# Patient Record
Sex: FEMALE | Race: WHITE | Employment: OTHER | URBAN - METROPOLITAN AREA
[De-identification: names, ages, dates, MRNs, and addresses within clinical notes are randomized per-mention and may not be internally consistent; named-entity substitution may affect disease eponyms.]

---

## 2017-02-24 ENCOUNTER — HOSPITAL ENCOUNTER (OUTPATIENT)
Dept: MAMMOGRAPHY | Age: 82
Discharge: HOME | End: 2017-02-24
Attending: INTERNAL MEDICINE | Admitting: INTERNAL MEDICINE

## 2017-02-24 DIAGNOSIS — Z12.31 ENCOUNTER FOR SCREENING MAMMOGRAM FOR BREAST CANCER: ICD-10-CM

## 2018-02-26 ENCOUNTER — HOSPITAL ENCOUNTER (OUTPATIENT)
Dept: MAMMOGRAPHY | Age: 83
Discharge: HOME | End: 2018-02-26
Attending: INTERNAL MEDICINE | Admitting: INTERNAL MEDICINE

## 2018-02-26 DIAGNOSIS — Z12.31 ENCOUNTER FOR SCREENING MAMMOGRAM FOR MALIGNANT NEOPLASM OF BREAST: ICD-10-CM

## 2018-09-05 ENCOUNTER — APPOINTMENT (OUTPATIENT)
Dept: GENERAL RADIOLOGY | Age: 83
End: 2018-09-05
Payer: MEDICARE

## 2018-09-05 ENCOUNTER — HOSPITAL ENCOUNTER (EMERGENCY)
Age: 83
Discharge: HOME | End: 2018-09-05
Attending: EMERGENCY MEDICINE
Payer: MEDICARE

## 2018-09-05 VITALS
WEIGHT: 246 LBS | TEMPERATURE: 98.2 F | OXYGEN SATURATION: 95 % | BODY MASS INDEX: 44.99 KG/M2 | SYSTOLIC BLOOD PRESSURE: 182 MMHG | HEART RATE: 65 BPM | DIASTOLIC BLOOD PRESSURE: 77 MMHG | RESPIRATION RATE: 14 BRPM

## 2018-09-05 DIAGNOSIS — R07.9 CHEST PAIN, UNSPECIFIED TYPE: Primary | ICD-10-CM

## 2018-09-05 LAB
A/G RATIO: 1.1 (ref 1.1–2.2)
ALBUMIN SERPL-MCNC: 3.9 G/DL (ref 3.4–5)
ALP BLD-CCNC: 40 U/L (ref 40–129)
ALT SERPL-CCNC: 22 U/L (ref 10–40)
ANION GAP SERPL CALCULATED.3IONS-SCNC: 16 MMOL/L (ref 3–16)
AST SERPL-CCNC: 36 U/L (ref 15–37)
BASOPHILS ABSOLUTE: 0.1 K/UL (ref 0–0.2)
BASOPHILS RELATIVE PERCENT: 1.2 %
BILIRUB SERPL-MCNC: 0.3 MG/DL (ref 0–1)
BUN BLDV-MCNC: 14 MG/DL (ref 7–20)
CALCIUM SERPL-MCNC: 9.6 MG/DL (ref 8.3–10.6)
CHLORIDE BLD-SCNC: 99 MMOL/L (ref 99–110)
CO2: 22 MMOL/L (ref 21–32)
CREAT SERPL-MCNC: 0.9 MG/DL (ref 0.6–1.2)
EOSINOPHILS ABSOLUTE: 0.7 K/UL (ref 0–0.6)
EOSINOPHILS RELATIVE PERCENT: 6.7 %
GFR AFRICAN AMERICAN: >60
GFR NON-AFRICAN AMERICAN: >60
GLOBULIN: 3.4 G/DL
GLUCOSE BLD-MCNC: 129 MG/DL (ref 70–99)
HCT VFR BLD CALC: 37.4 % (ref 36–48)
HEMOGLOBIN: 12.1 G/DL (ref 12–16)
LYMPHOCYTES ABSOLUTE: 1.6 K/UL (ref 1–5.1)
LYMPHOCYTES RELATIVE PERCENT: 16.4 %
MCH RBC QN AUTO: 26.9 PG (ref 26–34)
MCHC RBC AUTO-ENTMCNC: 32.4 G/DL (ref 31–36)
MCV RBC AUTO: 83.1 FL (ref 80–100)
MONOCYTES ABSOLUTE: 0.8 K/UL (ref 0–1.3)
MONOCYTES RELATIVE PERCENT: 8.1 %
NEUTROPHILS ABSOLUTE: 6.7 K/UL (ref 1.7–7.7)
NEUTROPHILS RELATIVE PERCENT: 67.6 %
PDW BLD-RTO: 15.7 % (ref 12.4–15.4)
PLATELET # BLD: 288 K/UL (ref 135–450)
PLATELET SLIDE REVIEW: ADEQUATE
PMV BLD AUTO: 8.9 FL (ref 5–10.5)
POTASSIUM SERPL-SCNC: 5.2 MMOL/L (ref 3.5–5.1)
RBC # BLD: 4.5 M/UL (ref 4–5.2)
SODIUM BLD-SCNC: 137 MMOL/L (ref 136–145)
TOTAL PROTEIN: 7.3 G/DL (ref 6.4–8.2)
TROPONIN: <0.01 NG/ML
WBC # BLD: 9.9 K/UL (ref 4–11)

## 2018-09-05 PROCEDURE — 84484 ASSAY OF TROPONIN QUANT: CPT

## 2018-09-05 PROCEDURE — 93005 ELECTROCARDIOGRAM TRACING: CPT | Performed by: EMERGENCY MEDICINE

## 2018-09-05 PROCEDURE — 71046 X-RAY EXAM CHEST 2 VIEWS: CPT

## 2018-09-05 PROCEDURE — 99285 EMERGENCY DEPT VISIT HI MDM: CPT

## 2018-09-05 PROCEDURE — 80053 COMPREHEN METABOLIC PANEL: CPT

## 2018-09-05 PROCEDURE — 85025 COMPLETE CBC W/AUTO DIFF WBC: CPT

## 2018-09-05 RX ORDER — METOPROLOL TARTRATE 50 MG/1
50 TABLET, FILM COATED ORAL 2 TIMES DAILY
Status: ON HOLD | COMMUNITY
End: 2022-08-11

## 2018-09-05 RX ORDER — CEPHALEXIN 500 MG/1
250 CAPSULE ORAL 2 TIMES DAILY
Status: ON HOLD | COMMUNITY
End: 2022-04-28 | Stop reason: ALTCHOICE

## 2018-09-05 ASSESSMENT — PAIN SCALES - GENERAL: PAINLEVEL_OUTOF10: 4

## 2018-09-05 ASSESSMENT — PAIN DESCRIPTION - LOCATION: LOCATION: CHEST

## 2018-09-06 LAB
EKG ATRIAL RATE: 61 BPM
EKG DIAGNOSIS: NORMAL
EKG P AXIS: 41 DEGREES
EKG P-R INTERVAL: 190 MS
EKG Q-T INTERVAL: 418 MS
EKG QRS DURATION: 84 MS
EKG QTC CALCULATION (BAZETT): 420 MS
EKG R AXIS: -12 DEGREES
EKG T AXIS: 69 DEGREES
EKG VENTRICULAR RATE: 61 BPM

## 2018-09-06 PROCEDURE — 93010 ELECTROCARDIOGRAM REPORT: CPT | Performed by: INTERNAL MEDICINE

## 2019-03-19 ENCOUNTER — HOSPITAL ENCOUNTER (OUTPATIENT)
Dept: WOMENS IMAGING | Age: 84
Discharge: HOME | End: 2019-03-19
Payer: MEDICARE

## 2019-03-19 DIAGNOSIS — Z12.31 ENCOUNTER FOR SCREENING MAMMOGRAM FOR MALIGNANT NEOPLASM OF BREAST: ICD-10-CM

## 2019-03-19 PROCEDURE — 77067 SCR MAMMO BI INCL CAD: CPT

## 2020-07-20 ENCOUNTER — HOSPITAL ENCOUNTER (OUTPATIENT)
Dept: WOMENS IMAGING | Age: 85
Discharge: HOME | End: 2020-07-20
Payer: MEDICARE

## 2020-07-20 PROCEDURE — 77067 SCR MAMMO BI INCL CAD: CPT

## 2021-02-23 NOTE — ED PROVIDER NOTES
[address] [facility]  ED  eMERGENCY dEPARTMENT eNCOUnter        Pt Name: Roxana Cramer  MRN: [id number]  Patrickgfelsy 1935  Date of evaluation: 9/5/2018  Provider: Moy Kimball DO  PCP: Lina Cottrell MD      CHIEF COMPLAINT       Chief Complaint   Patient presents with    Chest Pain     since monday. pain radiating down both arms    Back Pain     upper back       HISTORY OF PRESENT ILLNESS   (Location/Symptom, Timing/Onset, Context/Setting, Quality, Duration, Modifying Factors, Severity)  Note limiting factors. Roxana Cramer is a [de-identified] y.o. female  presents to the emergency department with complaint of Right-sided chest pain which started 2 days ago and was constant through yesterday although improved and has resolved today. She had no associated nausea. She had no shortness of breath. She had no trauma. She had no abdominal pain. She is told to come in for evaluation by her physician. She does have a history of coronary artery disease with stent. She has a history of hypertension. Nursing Notes were all reviewed and agreed with or any disagreements were addressed  in the HPI. REVIEW OF SYSTEMS    (2-9 systems for level 4, 10 or more for level 5)     Review of Systems  REVIEW OF SYSTEMS    Constitutional:  Denies fever, chills, weight loss or weakness   Eyes:  Denies photophobia or discharge   HENT:  Denies sore throat or ear pain   Respiratory:  Denies cough or shortness of breath   Cardiovascular:  Denies palpitations or swelling   GI:  Denies abdominal pain, nausea, vomiting, or diarrhea   Musculoskeletal:  Denies back pain   Skin:  Denies rash   Neurologic:  Denies headache, focal weakness or sensory changes   Endocrine:  Denies polyuria or polydypsia   Lymphatic:  Denies swollen glands   Psychiatric:  Denies depression, suicidal ideation or homicidal ideation   All systems negative except as marked. Positives and Pertinent negatives as per HPI. Chronic pain due to neoplasm

## 2021-05-08 ENCOUNTER — HOSPITAL ENCOUNTER (EMERGENCY)
Age: 86
Discharge: LEFT BEFORE DISCHARGE | End: 2021-05-08
Payer: MEDICARE

## 2021-05-08 VITALS
RESPIRATION RATE: 18 BRPM | BODY MASS INDEX: 44.16 KG/M2 | HEIGHT: 62 IN | TEMPERATURE: 97.5 F | SYSTOLIC BLOOD PRESSURE: 166 MMHG | DIASTOLIC BLOOD PRESSURE: 78 MMHG | HEART RATE: 64 BPM | OXYGEN SATURATION: 96 % | WEIGHT: 240 LBS

## 2021-05-08 ASSESSMENT — PAIN SCALES - GENERAL: PAINLEVEL_OUTOF10: 8

## 2021-05-10 ENCOUNTER — HOSPITAL ENCOUNTER (OUTPATIENT)
Dept: GENERAL RADIOLOGY | Age: 86
Discharge: HOME | End: 2021-05-10
Payer: MEDICARE

## 2021-05-10 DIAGNOSIS — N30.00 ACUTE CYSTITIS WITHOUT HEMATURIA: ICD-10-CM
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
## 2021-05-10 PROCEDURE — 71101 X-RAY EXAM UNILAT RIBS/CHEST: CPT

## 2021-09-13 ENCOUNTER — HOSPITAL ENCOUNTER (OUTPATIENT)
Dept: WOMENS IMAGING | Age: 86
Discharge: HOME | End: 2021-09-13
Payer: MEDICARE

## 2021-09-13 VITALS — HEIGHT: 62 IN | BODY MASS INDEX: 46.01 KG/M2 | WEIGHT: 250 LBS

## 2021-09-13 DIAGNOSIS — Z12.31 VISIT FOR SCREENING MAMMOGRAM: ICD-10-CM

## 2021-09-13 PROCEDURE — 77067 SCR MAMMO BI INCL CAD: CPT

## 2021-10-11 ENCOUNTER — HOSPITAL ENCOUNTER (OUTPATIENT)
Dept: GENERAL RADIOLOGY | Age: 86
Discharge: HOME | End: 2021-10-11
Payer: MEDICARE

## 2021-10-11 DIAGNOSIS — G89.29 CHRONIC PAIN OF RIGHT ANKLE: ICD-10-CM

## 2021-10-11 DIAGNOSIS — G89.29 CHRONIC RIGHT-SIDED LOW BACK PAIN WITHOUT SCIATICA: ICD-10-CM

## 2021-10-11 DIAGNOSIS — M54.50 CHRONIC RIGHT-SIDED LOW BACK PAIN WITHOUT SCIATICA: ICD-10-CM

## 2021-10-11 DIAGNOSIS — M79.651 ACUTE PAIN OF RIGHT THIGH: ICD-10-CM

## 2021-10-11 DIAGNOSIS — M25.571 CHRONIC PAIN OF RIGHT ANKLE: ICD-10-CM

## 2021-10-11 PROCEDURE — 73610 X-RAY EXAM OF ANKLE: CPT

## 2021-10-11 PROCEDURE — 72220 X-RAY EXAM SACRUM TAILBONE: CPT

## 2021-10-11 PROCEDURE — 73552 X-RAY EXAM OF FEMUR 2/>: CPT

## 2021-12-06 ENCOUNTER — OFFICE VISIT (OUTPATIENT)
Dept: ORTHOPEDIC SURGERY | Age: 86
End: 2021-12-06
Payer: MEDICARE

## 2021-12-06 VITALS — BODY MASS INDEX: 46.01 KG/M2 | HEIGHT: 62 IN | WEIGHT: 250 LBS

## 2021-12-06 DIAGNOSIS — M19.011 ARTHRITIS OF RIGHT ACROMIOCLAVICULAR JOINT: ICD-10-CM

## 2021-12-06 DIAGNOSIS — M25.511 ACUTE PAIN OF RIGHT SHOULDER: ICD-10-CM

## 2021-12-06 DIAGNOSIS — M75.41 SHOULDER IMPINGEMENT SYNDROME, RIGHT: Primary | ICD-10-CM

## 2021-12-06 PROCEDURE — 99203 OFFICE O/P NEW LOW 30 MIN: CPT | Performed by: ORTHOPAEDIC SURGERY

## 2021-12-06 NOTE — PROGRESS NOTES
ORTHOPAEDIC SURGERY H&P / CONSULTATION NOTE    Chief complaint:   Chief Complaint   Patient presents with    Shoulder Pain     right shoulder pain        History of present illness: The patient is a 82 y.o. female right hand dominant with subjective symptoms of right shoulder pain. The chief complaint is located at lateral and superior aspect of the right shoulder. Duration of symptoms has been for 3 weeks. The severity of symptoms is rated at 5/10 pain on intake form. 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 She states dull pain and some muscle soreness specifically while pulling. She has been using ibuprofen and ice as well as Voltaren topical gel and Tylenol but she feels if this is been dramatically helping her. She has not done any formal therapy. She states she has a difficult time getting to and from given her low back related issues. She denies trauma or fall. The patient has tried the below listed items prior to today's consultation for above listed chief complaint.     +   Over-the-counter anti-inflammatories/prescription medication anti-inflammatory. -   Physical therapy / guided home exercise program -     -   Previous corticosteroid injections    Past medical history:    Past Medical History:   Diagnosis Date    [facility]     Cancer ([facility]     Diabetes mellitus ([facility]     GERD (gastroesophageal reflux disease)     History of therapeutic radiation     Hyperlipidemia     Hypertension     Influenza A 12/19/2017    Kidney stone         Past surgical history:    Past Surgical History:   Procedure Laterality Date    BREAST LUMPECTOMY      right    COLONOSCOPY  8/31/2015    HYSTERECTOMY      LITHOTRIPSY      RECTAL SURGERY          Allergies:     Allergies   Allergen Reactions    Demerol Other (See Comments)     mean    Antihistamine [Diphenhydramine Hcl]     Ciprofloxacin     Codeine     Levofloxacin     Librax [Chlordiazepoxide-Methscopol]     Macrodantin [Nitrofurantoin Macrocrystal]     Morphine     Penicillins     Pyridium [Phenazopyridine Hcl]     Ranitidine Hcl     Spironolactone Diarrhea    Sulfa Antibiotics     Vicodin [Hydrocodone-Acetaminophen] Nausea Only     STATES THAT IT MOSTLY MAKES HER GOOFY, AND SLIGHTLY NAUSEOUS    Dilaudid [Hydromorphone] Nausea And Vomiting     WORKED WELL FOR PAIN BUT MADE HER NAUSEOUS FOR 2 HOURS         Medications:   Current Outpatient Medications:     metoprolol tartrate (LOPRESSOR) 50 MG tablet, Take 50 mg by mouth 2 times daily, Disp: , Rfl:     cephALEXin (KEFLEX) 500 MG capsule, Take 250 mg by mouth 2 times daily, Disp: , Rfl:     insulin lispro (HUMALOG) 100 UNIT/ML injection vial, Inject 25 Units into the skin 3 times daily (before meals) , Disp: , Rfl:     sitagliptan-metformin (JANUMET)  MG per tablet, Take 1 tablet by mouth 2 times daily (with meals). , Disp: , Rfl:     lisinopril (PRINIVIL;ZESTRIL) 40 MG tablet, Take 40 mg by mouth daily. , Disp: , Rfl:     pantoprazole (PROTONIX) 40 MG tablet, Take 20 mg by mouth daily , Disp: , Rfl:     aspirin 81 MG EC tablet, Take 81 mg by mouth daily. , Disp: , Rfl:     simvastatin (ZOCOR) 20 MG tablet, Take 20 mg by mouth nightly.  , Disp: , Rfl:     insulin glargine (LANTUS) 100 UNIT/ML injection, Inject 55 Units into the skin nightly , Disp: , Rfl:      Social history: Denies IV drug use. Social History     Socioeconomic History    Marital status:       Spouse name: Not on file    Number of children: Not on file    Years of education: Not on file    Highest education level: Not on file   Occupational History    Not on file   Tobacco Use    Smoking status: Never Smoker    Smokeless tobacco: Never Used   Substance and Sexual Activity    Alcohol use: No    Drug use: Not on file    Sexual activity: Not on file   Other Topics Concern  Not on file   Social History Narrative    Not on file     Social Determinants of Health     Financial Resource Strain:     Difficulty of Paying Living Expenses: Not on file   Food Insecurity:     Worried About Running Out of Food in the Last Year: Not on file    Andres of Food in the Last Year: Not on file   Transportation Needs:     Lack of Transportation (Medical): Not on file    Lack of Transportation (Non-Medical): Not on file   Physical Activity:     Days of Exercise per Week: Not on file    Minutes of Exercise per Session: Not on file   Stress:     Feeling of Stress : Not on file   Social Connections:     Frequency of Communication with Friends and Family: Not on file    Frequency of Social Gatherings with Friends and Family: Not on file    Attends Alevism Services: Not on file    Active Member of [address] [place] [address] [employer] or Organizations: Not on file    Attends Club or Organization Meetings: Not on file    Marital Status: Not on file   Intimate Partner Violence:     Fear of Current or Ex-Partner: Not on file    Emotionally Abused: Not on file    Physically Abused: Not on file    Sexually Abused: Not on file   Housing Stability:     Unable to Pay for Housing in the Last Year: Not on file    Number of [place] in the Last Year: Not on file    Unstable Housing in the Last Year: Not on file     Tobacco use. Social History     Tobacco Use   Smoking Status Never Smoker   Smokeless Tobacco Never Used     [place]    Review of systems: Patient denies any fevers chills chest pain shortness of breath nausea vomiting significant weight loss any change in voiding or bowel movements. Patient denies any significant numbness or tingling at baseline as it relates to this presenting symptom/chief complaint. The patient denies any significant problems with skin or any significant allergies. Physical examination:  Body mass index is 45.73 kg/m².   AAOx3, NCAT  EOMI  MMM  RR  Unlabored breathing, no wheezing  Skin intact BUE and BLE, warm and moist  Bilateral upper extremity examination specific to subjective symptoms    Exam Right Shoulder                                                Active Range of Motion (FF/Abd/ER/IR)         130/130/50/L5  Passive Range of Motion (FF/Abd/ER/IR)      140/140  Trace   Neer,    trace Camacho,      5 -/5   empty Can,          5/5 ER arm at the side,       5/5   belly Press,      5/5 bear Hug,       equivocal O'Briens,      none TTP at Biceps Tendon Sheath,     negative Speed, negative Yergeson, positive   TTP AC Joint, positive cross arm adduction,                          Skin intact throughout  5/5 D B T G IO EPL  SILT Ax, R, U, M  +2 radial pulse    Diagnostic imaging:  MY READ:  4V R shoulder 12/6/21: Positive anterior acromial spur, type II acromion with anterior spur. Positive acromioclavicular joint arthrosis mild to moderate. No gross glenohumeral arthrosis albeit there is seemingly impingement with the way the [place]  view was taken today subacromially    Pertinent lab work: Self-reported hemoglobin A1c of 7.6     Diagnosis Orders   1. Shoulder impingement syndrome, right     2. Arthritis of right acromioclavicular joint     3. Acute pain of right shoulder  XR SHOULDER RIGHT (MIN 2 VIEWS)       Assessment and plan: 82 y.o. female with current subjective symptoms and physical exam findings with diagnostic imaging correlating to right shoulder rotator cuff tendinitis/bursitis and acromioclavicular joint arthritis. -Time of 16 minutes was spent coordinating and discussing the clinical findings, reviewing diagnostic imaging as indicated, coordinating care with prior notes review and current clinical encounter documentation as it pertains to the patient's presenting subjective symptoms and diagnoses. -I reviewed with the patient the imaging findings as well as clinical exam and  how it correlates to subjective symptoms.   -I had a pleasant discussion with the patient today. I reviewed with her that currently her clinical examination trends towards impingement/rotator cuff tendinitis findings. I reviewed with her consideration for nonoperative treatment options at this time.  -I offered formal physical therapy however she does not wish to go given that she has low back issues and makes it difficult for prolonged travel. We have printed off a home exercise program with [employer] to be utilized. -OTC Tylenol Voltaren gel topical per bottle as needed discomfort.  -I did offer corticosteroid injection into the right shoulder subacromial space to treat bursitis/impingement related symptoms. Currently there is no A1c listed in our system however she self-reports with previous lab draw an outside provider. Should she wish to consider a corticosteroid injection which I did offer today however she wishes to hold off given she has had an improvement in her baseline symptoms over the last week or so, then this is not unreasonable. However she will contact the office in the future should she wish for this. Pending the results of the subacromial space corticosteroid injection, consideration for a right shoulder acromioclavicular joint corticosteroid injection intra-articularly.  -Should she exhaust nonoperative treatment options and continued to have pain despite above listed conservative care, consideration at that time for a MRI however I feel that she will likely continue to improve with conservative care  -All questions answered to the patient's satisfaction and the patient expressed understanding and agreement with the above listed treatment plan  -Follow up in 2 to 6 weeks  -Thank you for the clinical consultation and allowing me to participate in the patient's care.       Electronically signed by Gopal Gallagher MD on 12/6/21 at 1:05 PM GENTRY Gallagher MD       Orthopaedic Surgery-Sports Medicine        Disclaimer: This note was dictated with voice recognition software. Though review and correction are routinely performed, please contact the office/medical records for any errors requiring correction.

## 2021-12-16 ENCOUNTER — TELEPHONE (OUTPATIENT)
Dept: ORTHOPEDIC SURGERY | Age: 86
End: 2021-12-16

## 2021-12-16 NOTE — TELEPHONE ENCOUNTER
Other Patient is requesting a call back regarding her right shoulder. Patient had lymph nodes removed on right side and would like to know if she can get injuection. She was told [facility] oncologist nothing should be done on that side. Also she would like if dr Ashu Wu can take a look at her ankle xray from October she is still having pain.   [phone]

## 2021-12-17 NOTE — TELEPHONE ENCOUNTER
Patient called me back and we discussed that at this point, if her oncologist stated that she should not get any injections done in that shoulder, this is the plan we will stay with. She says that she is getting a bit better day by day but is having some pain still. I told her to keep up with the exercises and let us know if there is anything else needed. She can follow up with us as needed.

## 2022-01-31 ENCOUNTER — TELEPHONE (OUTPATIENT)
Dept: ORTHOPEDIC SURGERY | Age: 87
End: 2022-01-31

## 2022-01-31 NOTE — TELEPHONE ENCOUNTER
General Question     Subject: patient call today and has some questions about her ankle and Shoulder she just need  some questions answered. Please Advise.   Patient Elvia Bejarano    Contact Number: [phone]

## 2022-02-02 NOTE — TELEPHONE ENCOUNTER
PATIENT CALLED WITH REGARDS TO THIS MESSAGE. SHE STATES THAT THE PAIN IN HER ANKLE IS WORSE AND WAS WONDERING WHAT SHE CAN DO. I RECOMMENDED AN APPOINTMENT WITH DR. SUNSHINE KINCAID. SHE ACCEPTED THIS AND WILL SEE HER Tuesday THE 8TH AT THE [facility]. PATIENT ACCEPTED AND CONFIRMED.

## 2022-02-08 ENCOUNTER — OFFICE VISIT (OUTPATIENT)
Dept: ORTHOPEDIC SURGERY | Age: 87
End: 2022-02-08
Payer: MEDICARE

## 2022-02-08 VITALS — BODY MASS INDEX: 46.01 KG/M2 | HEIGHT: 62 IN | WEIGHT: 250 LBS

## 2022-02-08 DIAGNOSIS — M54.16 LUMBAR RADICULOPATHY, RIGHT: Primary | ICD-10-CM

## 2022-02-08 PROCEDURE — 99204 OFFICE O/P NEW MOD 45 MIN: CPT | Performed by: ORTHOPAEDIC SURGERY

## 2022-02-10 ENCOUNTER — TELEPHONE (OUTPATIENT)
Dept: ORTHOPEDIC SURGERY | Age: 87
End: 2022-02-10

## 2022-02-10 NOTE — TELEPHONE ENCOUNTER
General Question     Subject: PATIENT IS INQUIRING THAT IF HER EMG AND MRI CAN BE DONE IN THE SAME DAY  AT [facility]. PLEASE ADVISE.    Patient: Asad Rahman  Contact Number: [phone]

## 2022-02-16 ENCOUNTER — HOSPITAL ENCOUNTER (OUTPATIENT)
Dept: MRI IMAGING | Age: 87
Discharge: HOME | End: 2022-02-16
Payer: MEDICARE

## 2022-02-16 DIAGNOSIS — M54.16 LUMBAR RADICULOPATHY, RIGHT: ICD-10-CM

## 2022-02-16 PROCEDURE — 72148 MRI LUMBAR SPINE W/O DYE: CPT

## 2022-02-17 ENCOUNTER — TELEPHONE (OUTPATIENT)
Dept: ORTHOPEDIC SURGERY | Age: 87
End: 2022-02-17

## 2022-02-17 NOTE — TELEPHONE ENCOUNTER
General Question     Subject: PATIENT REQUESTING A CALL BACK IN REGARDS TO MRI RESULTS IN [facility] AND IF SHE SHOULD SCHEDULE NOW OR AFTER 3/16 EMG. PLEASE ADVISE.    Patient and /or Facility Request: Roseline Persaud  Contact Number: [phone]

## 2022-02-17 NOTE — TELEPHONE ENCOUNTER
Called patient and reviewed results of MRI of her lumbar spine. Recommend she proceed with EMG of R LE and then follow up with Dr. Travis Guillory for further eval and treatment recommendations.

## 2022-02-17 NOTE — TELEPHONE ENCOUNTER
Spoke with pt advised to get emg and follow up with spine after both tests( MRI and EMG 3/16)    Still would like a call bout her MRI results. rick      Mri Lumbar spine 2/16/2022:  Impression   1.  Multilevel lumbar spondylosis, most pronounced at L3-4 and L4-5 resulting   in severe canal stenosis.  Moderate canal stenosis at L2-3.   2. Moderate to severe left-sided foraminal stenosis at L5-S1.  Moderate   bilateral foraminal stenosis at L3-4, moderate right-sided foraminal stenosis   at L5-S1.

## 2022-02-18 ENCOUNTER — TELEPHONE (OUTPATIENT)
Dept: ORTHOPEDIC SURGERY | Age: 87
End: 2022-02-18

## 2022-02-18 NOTE — TELEPHONE ENCOUNTER
Spoke with Simone Dangelo in scheduling [facility] out pt:      lmom to notify pt she was placed on cancellation list for [facility] [place] for emg testing to get in sooner per [facility]. 3/16/22 is soonest available. Scheduling checked other locations [facility] has no availability until 4/2022.

## 2022-02-22 ENCOUNTER — TELEPHONE (OUTPATIENT)
Dept: ORTHOPEDIC SURGERY | Age: 87
End: 2022-02-22

## 2022-02-22 NOTE — TELEPHONE ENCOUNTER
Explanation of EMG to pt   And why necessary. Sent emg to central scheduling fax# with demographics.  rick  At [phone]

## 2022-02-22 NOTE — TELEPHONE ENCOUNTER
General Question     Subject: EMG QUESTIONS  Patient and /or Facility Request: Elvia Bejarano  Contact Number: [phone]    PATIENT CALLING WITH QUESTIONS REGARDING THE EMG TEST. PATIENT WOULD LIKE A CALL BACK TO GET CLARITY ON WHY SHE NEEDS A EMG AND SHE HAD A MRI. PLEASE CALL BACK AT THE ABOVE NUMBER.

## 2022-03-16 ENCOUNTER — HOSPITAL ENCOUNTER (OUTPATIENT)
Dept: NEUROLOGY | Age: 87
Discharge: HOME | End: 2022-03-16
Payer: MEDICARE

## 2022-03-16 DIAGNOSIS — M54.16 LUMBAR RADICULOPATHY, RIGHT: ICD-10-CM

## 2022-03-16 PROCEDURE — 95908 NRV CNDJ TST 3-4 STUDIES: CPT | Performed by: PHYSICAL MEDICINE & REHABILITATION

## 2022-03-16 PROCEDURE — 95885 MUSC TST DONE W/NERV TST LIM: CPT | Performed by: PHYSICAL MEDICINE & REHABILITATION

## 2022-03-16 NOTE — PROCEDURES
Test Date:  3/16/2022    Patient: Akua Umanzor : 1935 Physician: Claudette Thrasher DO   Sex: Female ID#:  Ref Phys: Anita Kelly MD     Patient Complaints:  Patient is a 82year-old female who presents with pain in the right lower extremity onset over one year ago. Patient History / Exam:  PMH: + diabetes managed with insulin. No back or leg surgery PE: reflexes trace normal strength     NCV & EMG Findings:  Evaluation of the right sural sensory nerve showed reduced amplitude (5 µV). All remaining nerves (as indicated in the following tables) were within normal limits. Needle evaluation of the right gastrocnemius (medial head) and the right lumbo paraspinal (lower) muscles showed increased insertional activity and slightly increased spontaneous activity. All remaining muscles (as indicated in the following table) showed no evidence of electrical instability. Impression:  Study is consistent with right S1 radiculopathy.  no evidence of a generalized peripheral neuropathy        Claudette Thrasher DO        Nerve Conduction Studies  Motor Nerve Results      Latency Amplitude F-Lat Segment Distance CV Comment   Site (ms) Norm (mV) Norm (ms)  (cm) (m/s) Norm    Right Fibular (EDB) Motor   Ankle 4.4  < 6.1 3.3  > 2.0         Bel Fib Head 10.5 - 3.8 -  Bel Fib Head-Ankle 31 51  > 38    Right Tibial (AHB) Motor   Ankle 4.4  < 6.1 4.8  > 4.4         Knee 12.4 - - -  Knee-Ankle 34 43  > 39      Sensory Nerve Results      Latency (Peak) Amplitude (P-P) Segment Distance CV Comment   Site (ms) Norm (µV) Norm  (cm) (m/s) Norm    Right Sural Sensory   Calf-Lat Mall 3.6  < 4.0 5  > 5 Calf-Lat Mall 14 39  > 35        Electromyography     Side Muscle Nerve Root Ins Act Fibs Psw Amp Dur Poly Recrt Int Gretchen Sarah Comment   Right Gluteus Med Sup Gluteal L5-S1 Nml Nml Nml Nml Nml 0 Nml Nml    Right Vastus Med Femoral L2-L4 Nml Nml Nml Nml Nml 0 Nml Nml    Right Add Longus Obturator L2-L4 Nml Nml Nml Nml Nml 0 Nml Nml    Right Tib Anterior Deep Fibular,  Fibula. .. L4-L5 Nml Nml Nml Nml Nml 0 Nml Nml    Right Fib longus  L5-S1 Nml Nml Nml Nml Nml 0 Nml Nml    Right Gastroc MH Tibial S1-S2 Incr Nml 1+ Nml Nml 0 Nml Nml    Right Ext Jeffers Long Deep Fibular,  Fibula. .. L5-S1 Nml Nml Nml Nml Nml 0 Nml Nml    Right EDB Deep Fibular,  Fibula. .. L5-S1 Nml Nml Nml Nml Nml 0 Nml Nml    Right AHB Medial Plantar,  Tibi. ..  S1-S2 Nml Nml Nml Nml Nml 0 Nml Nml    Right Lumbo Paraspinal (Upper) Rami L1-L2 Nml Nml Nml         Right Lumbo Paraspinal (Mid) Rami L3-L4 Nml Nml Nml         Right Lumbo Paraspinal (Lower) Rami L5-S1 Incr Nml 1+             Electronically signed by Caesar Osborne DO on 3/16/2022 at 1:15 PM

## 2022-03-23 ENCOUNTER — OFFICE VISIT (OUTPATIENT)
Dept: ORTHOPEDIC SURGERY | Age: 87
End: 2022-03-23
Payer: MEDICARE

## 2022-03-23 VITALS — BODY MASS INDEX: 46.01 KG/M2 | HEIGHT: 62 IN | WEIGHT: 250 LBS

## 2022-03-23 DIAGNOSIS — M43.16 SPONDYLOLISTHESIS AT L4-L5 LEVEL: ICD-10-CM

## 2022-03-23 DIAGNOSIS — M48.061 SPINAL STENOSIS OF LUMBAR REGION, UNSPECIFIED WHETHER NEUROGENIC CLAUDICATION PRESENT: Primary | ICD-10-CM

## 2022-03-23 PROCEDURE — 99214 OFFICE O/P EST MOD 30 MIN: CPT | Performed by: ORTHOPAEDIC SURGERY

## 2022-03-23 NOTE — PROGRESS NOTES
New Patient: LUMBAR SPINE    Referring Provider:  No ref. provider found    CHIEF COMPLAINT:    Chief Complaint   Patient presents with    Back Pain     lumbar       HISTORY OF PRESENT ILLNESS:       Ms. Reggie Grimaldo  is a pleasant 82 y.o. female here for evaluation regarding her LBP and right leg pain. She states her pain began insidiously about 1+ years ago. Her pain has steadily continued since then. She rates her back pain 2/10 and right leg pain 3-9/10. She describes the pain as intermittent to constant. Pain is worse with standing, walking, at times lying down and improved some with sitting, changing positions, and leaning forward especially onto a grocery cart. The leg pain radiates down the posterior lateral aspect of her right leg to her ankle. She has a burning sensation and mild numbness and tingling in her right leg. She denies weakness of her right or left leg and has a history of urinary retention and has had bladder scope recently and does have frequent episodes of diarrhea which is not new. Bowel or bladder dysfunction. The pain at times disrupts her sleep.    Pain Assessment  Location of Pain: Back  Severity of Pain: 2  Quality of Pain: Other (Comment)  Duration of Pain: Other (Comment)]  Current/Past Treatment:   · Physical Therapy: None  · Chiropractic: None  · Injection: None  · Medications: Tylenol    Past Medical History:   Past Medical History:   Diagnosis Date    [facility]     Cancer [facility]     Diabetes mellitus ([facility]     GERD (gastroesophageal reflux disease)     History of therapeutic radiation     Hyperlipidemia     Hypertension     Influenza A 12/19/2017    Kidney stone         Past Surgical History:     Past Surgical History:   Procedure Laterality Date    BREAST LUMPECTOMY      right    COLONOSCOPY  8/31/2015    HYSTERECTOMY      LITHOTRIPSY      RECTAL SURGERY         Current Medications:     Current Outpatient Medications:     metoprolol tartrate (LOPRESSOR) 50 MG tablet, Take 50 mg by mouth 2 times daily, Disp: , Rfl:     cephALEXin (KEFLEX) 500 MG capsule, Take 250 mg by mouth 2 times daily, Disp: , Rfl:     insulin lispro (HUMALOG) 100 UNIT/ML injection vial, Inject 25 Units into the skin 3 times daily (before meals) , Disp: , Rfl:     sitagliptan-metformin (JANUMET)  MG per tablet, Take 1 tablet by mouth 2 times daily (with meals). , Disp: , Rfl:     lisinopril (PRINIVIL;ZESTRIL) 40 MG tablet, Take 40 mg by mouth daily. , Disp: , Rfl:     pantoprazole (PROTONIX) 40 MG tablet, Take 20 mg by mouth daily , Disp: , Rfl:     aspirin 81 MG EC tablet, Take 81 mg by mouth daily. , Disp: , Rfl:     simvastatin (ZOCOR) 20 MG tablet, Take 20 mg by mouth nightly.  , Disp: , Rfl:     insulin glargine (LANTUS) 100 UNIT/ML injection, Inject 55 Units into the skin nightly , Disp: , Rfl:     Allergies:  Demerol, Antihistamine [diphenhydramine hcl], Ciprofloxacin, Codeine, Levofloxacin, Librax [chlordiazepoxide-methscopol], Macrodantin [nitrofurantoin macrocrystal], Morphine, Penicillins, Pyridium [phenazopyridine hcl], Ranitidine hcl, Spironolactone, Sulfa antibiotics, Vicodin [hydrocodone-acetaminophen], and Dilaudid [hydromorphone]    Social History:    reports that she has never smoked. She has never used smokeless tobacco. She reports that she does not drink alcohol.     Family History:   Family History   Problem Relation Age of Onset    Breast Cancer Paternal Cousin     Breast Cancer Maternal Cousin        REVIEW OF SYSTEMS: Full ROS noted & scanned   CONSTITUTIONAL: Denies unexplained weight loss, fevers, chills or fatigue  NEUROLOGICAL: Denies unsteady gait or progressive weakness  MUSCULOSKELETAL: Denies joint swelling or redness  PSYCHOLOGICAL: Denies anxiety, depression   SKIN: Denies skin changes, delayed healing, rash, itching   HEMATOLOGIC: Denies easy bleeding or bruising  ENDOCRINE: Denies excessive thirst, urination, heat/cold  RESPIRATORY: Denies current dyspnea, cough  GI: Denies nausea, vomiting, diarrhea   : Denies bowel or bladder issues      PHYSICAL EXAM:    Vitals: Height 5' 2.01\" (1.575 m), weight 250 lb (113.4 kg). GENERAL EXAM:  · General Apparence: Patient is adequately groomed with no evidence of malnutrition. · Orientation: The patient is oriented to time, place and person. · Mood & Affect:The patient's mood and affect are appropriate. · Vascular: Examination reveals no swelling tenderness in upper or lower extremities. Good capillary refill. · Lymphatic: The lymphatic examination bilaterally reveals all areas to be without enlargement or induration  · Sensation: Sensation is intact without deficit  · Coordination/Balance: Good coordination. LUMBAR/SACRAL EXAMINATION:  · Inspection: Local inspection shows no step-off or bruising. Lumbar alignment is normal.  Sagittal and Coronal balance is neutral.      · Palpation: Diffuse tenderness at the midline. No tenderness bilaterally at the paraspinal or trochanters. There is no step-off or paraspinal spasm. · Range of Motion: Lumbar flexion, extension and rotation are moderately limited due to pain. · Strength:   Strength testing is 5/5 in all muscle groups tested. · Special Tests:   Straight leg raise and crossed SLR negative. Leg length and pelvis level. · Skin: There are no rashes, ulcerations or lesions. · Reflexes: Reflexes are symmetrically 2+ at the patellar and ankle tendons. Clonus absent bilaterally at the feet. · Gait & station: Patient ambulates with a Rollator    · Additional Examinations:   · RIGHT LOWER EXTREMITY: Inspection/examination of the right lower extremity does not show any tenderness, deformity or injury. Range of motion is unremarkable. There is no gross instability. There are no rashes, ulcerations or lesions.  Strength and tone are normal.  · LEFT LOWER EXTREMITY:  Inspection/examination of the left lower extremity does not show any tenderness, deformity or injury. Range of motion is unremarkable. There is no gross instability. There are no rashes, ulcerations or lesions. Strength and tone are normal.    Diagnostic Testing:    MRI of the lumbar spine that was obtained on 2/16/2022 was reviewed with the patient today which shows  Impression   1. Multilevel lumbar spondylosis, most pronounced at L3-4 and L4-5 resulting   in severe canal stenosis.  Moderate canal stenosis at L2-3.   2. Moderate to severe left-sided foraminal stenosis at L5-S1.  Moderate   bilateral foraminal stenosis at L3-4, moderate right-sided foraminal stenosis   at L5-S1. Impression:   Spinal stenosis on the right at L5-S1  Spondylolisthesis L4-5    Plan:      · We discussed the diagnosis and treatment options including observation, additional oral steroids, physical therapy, epidural injections and additional imaging. She wishes to proceed with referral to pain management for consideration of medication versus spinal injections. .    · Follow up -as needed    Old records were reviewed.

## 2022-04-28 ENCOUNTER — HOSPITAL ENCOUNTER (OUTPATIENT)
Age: 87
Setting detail: OUTPATIENT SURGERY
Discharge: HOME | End: 2022-04-28
Attending: PAIN MEDICINE | Admitting: PAIN MEDICINE
Payer: MEDICARE

## 2022-04-28 VITALS
RESPIRATION RATE: 16 BRPM | BODY MASS INDEX: 46.01 KG/M2 | SYSTOLIC BLOOD PRESSURE: 149 MMHG | TEMPERATURE: 98.2 F | WEIGHT: 250 LBS | DIASTOLIC BLOOD PRESSURE: 68 MMHG | OXYGEN SATURATION: 97 % | HEIGHT: 62 IN | HEART RATE: 62 BPM

## 2022-04-28 LAB
GLUCOSE BLD-MCNC: 191 MG/DL (ref 70–99)
PERFORMED ON: ABNORMAL

## 2022-04-28 PROCEDURE — 2500000003 HC RX 250 WO HCPCS: Performed by: PAIN MEDICINE

## 2022-04-28 PROCEDURE — 2709999900 HC NON-CHARGEABLE SUPPLY: Performed by: PAIN MEDICINE

## 2022-04-28 PROCEDURE — 6360000002 HC RX W HCPCS: Performed by: PAIN MEDICINE

## 2022-04-28 PROCEDURE — 3600000002 HC SURGERY LEVEL 2 BASE: Performed by: PAIN MEDICINE

## 2022-04-28 PROCEDURE — 7100000011 HC PHASE II RECOVERY - ADDTL 15 MIN: Performed by: PAIN MEDICINE

## 2022-04-28 PROCEDURE — 62323 NJX INTERLAMINAR LMBR/SAC: CPT | Performed by: PAIN MEDICINE

## 2022-04-28 PROCEDURE — 7100000010 HC PHASE II RECOVERY - FIRST 15 MIN: Performed by: PAIN MEDICINE

## 2022-04-28 PROCEDURE — 2580000003 HC RX 258: Performed by: PAIN MEDICINE

## 2022-04-28 PROCEDURE — 99152 MOD SED SAME PHYS/QHP 5/>YRS: CPT | Performed by: PAIN MEDICINE

## 2022-04-28 RX ORDER — SODIUM CHLORIDE, SODIUM LACTATE, POTASSIUM CHLORIDE, CALCIUM CHLORIDE 600; 310; 30; 20 MG/100ML; MG/100ML; MG/100ML; MG/100ML
INJECTION, SOLUTION INTRAVENOUS CONTINUOUS
Status: DISCONTINUED | OUTPATIENT
Start: 2022-04-28 | End: 2022-04-28 | Stop reason: HOSPADM

## 2022-04-28 RX ORDER — MIDAZOLAM HYDROCHLORIDE 1 MG/ML
INJECTION INTRAMUSCULAR; INTRAVENOUS PRN
Status: DISCONTINUED | OUTPATIENT
Start: 2022-04-28 | End: 2022-04-28 | Stop reason: ALTCHOICE

## 2022-04-28 RX ORDER — MIDAZOLAM HYDROCHLORIDE 1 MG/ML
INJECTION INTRAMUSCULAR; INTRAVENOUS
Status: DISCONTINUED
Start: 2022-04-28 | End: 2022-04-28 | Stop reason: HOSPADM

## 2022-04-28 RX ORDER — LIDOCAINE HYDROCHLORIDE 10 MG/ML
INJECTION, SOLUTION EPIDURAL; INFILTRATION; INTRACAUDAL; PERINEURAL PRN
Status: DISCONTINUED | OUTPATIENT
Start: 2022-04-28 | End: 2022-04-28 | Stop reason: ALTCHOICE

## 2022-04-28 RX ORDER — LIDOCAINE HYDROCHLORIDE 10 MG/ML
INJECTION, SOLUTION EPIDURAL; INFILTRATION; INTRACAUDAL; PERINEURAL
Status: DISCONTINUED
Start: 2022-04-28 | End: 2022-04-28 | Stop reason: HOSPADM

## 2022-04-28 RX ADMIN — SODIUM CHLORIDE, POTASSIUM CHLORIDE, SODIUM LACTATE AND CALCIUM CHLORIDE: 600; 310; 30; 20 INJECTION, SOLUTION INTRAVENOUS at 12:02

## 2022-04-28 ASSESSMENT — PAIN - FUNCTIONAL ASSESSMENT
PAIN_FUNCTIONAL_ASSESSMENT: 0-10
PAIN_FUNCTIONAL_ASSESSMENT: PREVENTS OR INTERFERES SOME ACTIVE ACTIVITIES AND ADLS

## 2022-04-28 ASSESSMENT — PAIN DESCRIPTION - DESCRIPTORS: DESCRIPTORS: ACHING;SHARP

## 2022-04-28 NOTE — PROGRESS NOTES
Pt taken to care via w/c, tolerable pain. Some numbness and tingling in lower extremities. Discharge instructions given to pt and brother in law, verbalized understanding and states no questions at this time. Brother in law driving pt home.

## 2022-04-28 NOTE — PROGRESS NOTES
Patient is able to demonstrate the ability to move from a reclining position to an upright position within the recliner. Pt checked in for procedure. . IV Started. Pt tolerated well. Pt's  in the car and number provided on the chart.

## 2022-05-17 ENCOUNTER — HOSPITAL ENCOUNTER (OUTPATIENT)
Dept: WOMENS IMAGING | Age: 87
Discharge: HOME | End: 2022-05-17
Payer: MEDICARE

## 2022-05-17 DIAGNOSIS — Z78.0 POSTMENOPAUSAL: ICD-10-CM

## 2022-05-17 PROCEDURE — 77080 DXA BONE DENSITY AXIAL: CPT

## 2022-06-02 ENCOUNTER — HOSPITAL ENCOUNTER (OUTPATIENT)
Age: 87
Setting detail: OUTPATIENT SURGERY
Discharge: HOME | End: 2022-06-02
Attending: PAIN MEDICINE | Admitting: PAIN MEDICINE
Payer: MEDICARE

## 2022-06-02 VITALS
BODY MASS INDEX: 46.01 KG/M2 | DIASTOLIC BLOOD PRESSURE: 80 MMHG | RESPIRATION RATE: 18 BRPM | OXYGEN SATURATION: 99 % | SYSTOLIC BLOOD PRESSURE: 178 MMHG | HEIGHT: 62 IN | WEIGHT: 250 LBS | TEMPERATURE: 98.7 F | HEART RATE: 68 BPM

## 2022-06-02 LAB
GLUCOSE BLD-MCNC: 179 MG/DL (ref 70–99)
PERFORMED ON: ABNORMAL

## 2022-06-02 PROCEDURE — 2709999900 HC NON-CHARGEABLE SUPPLY: Performed by: PAIN MEDICINE

## 2022-06-02 PROCEDURE — 99152 MOD SED SAME PHYS/QHP 5/>YRS: CPT | Performed by: PAIN MEDICINE

## 2022-06-02 PROCEDURE — 3600000002 HC SURGERY LEVEL 2 BASE: Performed by: PAIN MEDICINE

## 2022-06-02 PROCEDURE — 6360000002 HC RX W HCPCS: Performed by: PAIN MEDICINE

## 2022-06-02 PROCEDURE — 7100000011 HC PHASE II RECOVERY - ADDTL 15 MIN: Performed by: PAIN MEDICINE

## 2022-06-02 PROCEDURE — A9577 INJ MULTIHANCE: HCPCS | Performed by: PAIN MEDICINE

## 2022-06-02 PROCEDURE — 2500000003 HC RX 250 WO HCPCS: Performed by: PAIN MEDICINE

## 2022-06-02 PROCEDURE — 64484 NJX AA&/STRD TFRM EPI L/S EA: CPT | Performed by: PAIN MEDICINE

## 2022-06-02 PROCEDURE — 64483 NJX AA&/STRD TFRM EPI L/S 1: CPT | Performed by: PAIN MEDICINE

## 2022-06-02 PROCEDURE — 2580000003 HC RX 258: Performed by: PAIN MEDICINE

## 2022-06-02 PROCEDURE — 7100000010 HC PHASE II RECOVERY - FIRST 15 MIN: Performed by: PAIN MEDICINE

## 2022-06-02 PROCEDURE — 99153 MOD SED SAME PHYS/QHP EA: CPT | Performed by: PAIN MEDICINE

## 2022-06-02 PROCEDURE — 6360000004 HC RX CONTRAST MEDICATION: Performed by: PAIN MEDICINE

## 2022-06-02 PROCEDURE — 3600000012 HC SURGERY LEVEL 2 ADDTL 15MIN: Performed by: PAIN MEDICINE

## 2022-06-02 RX ORDER — MIDAZOLAM HYDROCHLORIDE 1 MG/ML
INJECTION INTRAMUSCULAR; INTRAVENOUS PRN
Status: DISCONTINUED | OUTPATIENT
Start: 2022-06-02 | End: 2022-06-02 | Stop reason: ALTCHOICE

## 2022-06-02 RX ORDER — SODIUM CHLORIDE, SODIUM LACTATE, POTASSIUM CHLORIDE, CALCIUM CHLORIDE 600; 310; 30; 20 MG/100ML; MG/100ML; MG/100ML; MG/100ML
INJECTION, SOLUTION INTRAVENOUS CONTINUOUS
Status: DISCONTINUED | OUTPATIENT
Start: 2022-06-02 | End: 2022-06-02 | Stop reason: HOSPADM

## 2022-06-02 RX ORDER — MIDAZOLAM HYDROCHLORIDE 1 MG/ML
INJECTION INTRAMUSCULAR; INTRAVENOUS
Status: DISCONTINUED
Start: 2022-06-02 | End: 2022-06-02 | Stop reason: WASHOUT

## 2022-06-02 RX ORDER — LIDOCAINE HYDROCHLORIDE 10 MG/ML
INJECTION, SOLUTION EPIDURAL; INFILTRATION; INTRACAUDAL; PERINEURAL PRN
Status: DISCONTINUED | OUTPATIENT
Start: 2022-06-02 | End: 2022-06-02 | Stop reason: ALTCHOICE

## 2022-06-02 RX ORDER — MIDAZOLAM HYDROCHLORIDE 1 MG/ML
INJECTION INTRAMUSCULAR; INTRAVENOUS
Status: DISCONTINUED
Start: 2022-06-02 | End: 2022-06-02 | Stop reason: HOSPADM

## 2022-06-02 RX ORDER — PRAVASTATIN SODIUM 20 MG
TABLET ORAL
COMMUNITY
Start: 2022-05-06

## 2022-06-02 RX ADMIN — LIDOCAINE HYDROCHLORIDE 0.1 ML: 10 INJECTION, SOLUTION EPIDURAL; INFILTRATION; INTRACAUDAL; PERINEURAL at 15:17

## 2022-06-02 RX ADMIN — SODIUM CHLORIDE, POTASSIUM CHLORIDE, SODIUM LACTATE AND CALCIUM CHLORIDE: 600; 310; 30; 20 INJECTION, SOLUTION INTRAVENOUS at 15:17

## 2022-06-02 ASSESSMENT — PAIN DESCRIPTION - DESCRIPTORS: DESCRIPTORS: ACHING;DULL;NUMBNESS

## 2022-06-02 ASSESSMENT — PAIN - FUNCTIONAL ASSESSMENT
PAIN_FUNCTIONAL_ASSESSMENT: 0-10
PAIN_FUNCTIONAL_ASSESSMENT: 0-10
PAIN_FUNCTIONAL_ASSESSMENT: PREVENTS OR INTERFERES WITH MANY ACTIVE NOT PASSIVE ACTIVITIES

## 2022-06-02 NOTE — PROGRESS NOTES
Discharge  instructions reviewed. Pt and family verbalize understanding with no further questions. VSS. Pt discharged via [facility] to car. Assessment unchanged. Pt states her pain is a 5 on 1-10 pain scale.

## 2022-06-02 NOTE — PROCEDURES
Edilma Yousif is a 82 y.o. female patient. No diagnosis found. Past Medical History:   Diagnosis Date    [facility]     Cancer ([facility]     [facility]     GERD (gastroesophageal reflux disease)     History of therapeutic radiation     Hyperlipidemia     Hypertension     Influenza A 12/19/2017    Kidney stone      Blood pressure (!) 187/106, pulse 80, temperature 97.4 °F (36.3 °C), temperature source Temporal, resp. rate 16, height 5' 2\" (1.575 m), weight 250 lb (113.4 kg), SpO2 90 %. Procedures    Parviz Whitaker MD  6/2/2022  TRANSFORAMINAL EPIDURAL AT R L45  LEVELS  71730 and 55554 x  with 82172,     INDICATIONS:  Lumbar Radiculitis 724.4, M54.16  OPERATIVE PROCEDURE:  Consent was signed by the patient after the risks and benefits were explained. With the patient in the prone position, the back was prepped with Prevail and draped. Aseptic technique was used at every stage of the procedure. Oblique fluoroscopic guidance was used to visualize the pedicle of the _L5__ vertebral body on the _R__ side. Skin infiltration was with 0.5 cc of 1% Lidocaine using a 30-gauge needle followed by placement of a _22__ -gauge _5__ -inch needle using oblique fluoroscopic guidance into the transforaminal epidural space way under the pedicle at the medial aspect just lateral to SAP. Final needle position was checked in AP view, which was just lateral to the 6 oclock position on the pedicle. Aspiration was negative for CSF or blood . Injection of Omnipaque dye (0.5 cc) showed appropriate spread along the nerve root and also into the epidural space and _1__ cc of _1__ % Lidocaine with _4.5 mg of CELESTONE   The needle was pulled out intact and discharge instructions were given. ESTIMATED BLOOD LOSS:  Less than 1 cc      Pulse Ox Monitoring was done.      Exact similar procedure was done at _R L4__ level.            ________________________________                   Taj Benavidez, M.D.

## 2022-08-08 ENCOUNTER — TELEPHONE (OUTPATIENT)
Dept: ORTHOPEDIC SURGERY | Age: 87
End: 2022-08-08

## 2022-08-08 NOTE — TELEPHONE ENCOUNTER
Notified [facility] regarding the medical records (all Jacquelynne Dates) for patient

## 2022-08-11 ENCOUNTER — HOSPITAL ENCOUNTER (OUTPATIENT)
Age: 87
Setting detail: OUTPATIENT SURGERY
Discharge: HOME | End: 2022-08-11
Attending: PAIN MEDICINE | Admitting: PAIN MEDICINE
Payer: MEDICARE

## 2022-08-11 VITALS
TEMPERATURE: 98 F | BODY MASS INDEX: 44.9 KG/M2 | DIASTOLIC BLOOD PRESSURE: 74 MMHG | OXYGEN SATURATION: 96 % | HEART RATE: 88 BPM | HEIGHT: 62 IN | RESPIRATION RATE: 16 BRPM | WEIGHT: 244 LBS | SYSTOLIC BLOOD PRESSURE: 177 MMHG

## 2022-08-11 PROBLEM — M47.816 LUMBAR SPONDYLOSIS: Status: ACTIVE | Noted: 2022-08-11

## 2022-08-11 LAB
GLUCOSE BLD-MCNC: 164 MG/DL (ref 70–99)
PERFORMED ON: ABNORMAL

## 2022-08-11 PROCEDURE — 7100000010 HC PHASE II RECOVERY - FIRST 15 MIN: Performed by: PAIN MEDICINE

## 2022-08-11 PROCEDURE — 2500000003 HC RX 250 WO HCPCS: Performed by: PAIN MEDICINE

## 2022-08-11 PROCEDURE — 64494 INJ PARAVERT F JNT L/S 2 LEV: CPT | Performed by: PAIN MEDICINE

## 2022-08-11 PROCEDURE — 2709999900 HC NON-CHARGEABLE SUPPLY: Performed by: PAIN MEDICINE

## 2022-08-11 PROCEDURE — 3600000002 HC SURGERY LEVEL 2 BASE: Performed by: PAIN MEDICINE

## 2022-08-11 PROCEDURE — 64493 INJ PARAVERT F JNT L/S 1 LEV: CPT | Performed by: PAIN MEDICINE

## 2022-08-11 PROCEDURE — 7100000011 HC PHASE II RECOVERY - ADDTL 15 MIN: Performed by: PAIN MEDICINE

## 2022-08-11 RX ORDER — HYDROCHLOROTHIAZIDE 25 MG/1
25 TABLET ORAL EVERY MORNING
COMMUNITY
Start: 2021-12-09

## 2022-08-11 RX ORDER — BUPIVACAINE HYDROCHLORIDE 7.5 MG/ML
INJECTION, SOLUTION EPIDURAL; RETROBULBAR
Status: DISCONTINUED
Start: 2022-08-11 | End: 2022-08-11 | Stop reason: HOSPADM

## 2022-08-11 RX ORDER — LIDOCAINE HYDROCHLORIDE 10 MG/ML
INJECTION, SOLUTION EPIDURAL; INFILTRATION; INTRACAUDAL; PERINEURAL PRN
Status: DISCONTINUED | OUTPATIENT
Start: 2022-08-11 | End: 2022-08-11 | Stop reason: ALTCHOICE

## 2022-08-11 RX ORDER — BUPIVACAINE HYDROCHLORIDE 7.5 MG/ML
INJECTION, SOLUTION EPIDURAL; RETROBULBAR PRN
Status: DISCONTINUED | OUTPATIENT
Start: 2022-08-11 | End: 2022-08-11 | Stop reason: ALTCHOICE

## 2022-08-11 ASSESSMENT — PAIN DESCRIPTION - DESCRIPTORS: DESCRIPTORS: SHARP;DISCOMFORT;SORE

## 2022-08-11 NOTE — PROGRESS NOTES
Discharge instructions given to pt and verbalized understanding, Dr Camille Perez aware that there was no change in the pts pain level post procedure, pt wheeled out to car without complications

## 2022-08-11 NOTE — DISCHARGE INSTRUCTIONS
[address]    [phone]    Post Pain Management Injection    PATIENT INSTRUCTIONS:     -Resume Normal Diet  -Other    ACTIVITY:    -No driving or operating machinery for 8 hours post procedure without sedation and 24 hours with sedation. If you are seen driving during this time the proper authorities will be notified.  -Do not stay alone for 4-6 hours after the procedure.  -If you have had IV sedation, do not sign legal documents, make any major decisions, or be involved in work decisions for the remainder of the day. -May shower or bathe.  -Resume normal activity when full movement/sensation has returned in extremities. 3)  SITE CARE:    -Observe puncture site for signs of infection (redness, warmth swelling, drainage with a foul odor, fever or increased tenderness). 4)  EXPECTED SIDE EFFECTS:    -Numbness/tingling/weakness in extremities, if this lasts more than 6 hours notify Dr. Eber Kohler. -Muscle stiffness, soreness at puncture site (soreness may last 2-4 days). -pre procedure pain           5)  TO REACH DR. David Reynoso: Call [phone]    6)  ADDITIONAL INSTRUCTIONS:    Follow-up as scheduled or call for appointment if not already done. Patients taking Coumadin may resume taking as before the procedure.

## 2022-08-11 NOTE — PROGRESS NOTES
Patient is able to demonstrate the ability to move from a reclining position to an upright position within the recliner. Pt checked in for procedure. . Pt's  in the car and number provided on the chart.

## 2022-08-11 NOTE — PROCEDURES
Lethia Najjar is a 82 y.o. female patient. No diagnosis found. Past Medical History:   Diagnosis Date    Breast cancer ([facility]     Cancer ([facility]     [facility]     GERD (gastroesophageal reflux disease)     History of therapeutic radiation     Hyperlipidemia     Hypertension     Influenza A 12/19/2017    Kidney stone      Blood pressure (!) 147/69, pulse 82, temperature 98 °F (36.7 °C), temperature source Temporal, resp. rate 18, height 5' 2\" (1.575 m), weight 244 lb (110.7 kg), SpO2 94 %. Procedures    Lambert Landa MD  8/11/2022    INDICATIONS:  Lumbar Spondylosis 721.3, M47.816,M47.817  OPERATIVE PROCEDURE:  R L3,L4,L5 MBB TO BLOCK THE L45 AND L5S1 FACET JOINT  31650 and 44260 WITH 99692,   Consent signed by patient after risks and benefits explained. Patient was in prone position. Back was prepped with Prevail and draped. Aseptic technique used at every stage of the procedure. Skin wheal with 1% Lidocaine with 30-gauge needle. A _22__ -gauge, _5__ -inch spinal needle was placed under fluoroscopic guidance using AP/oblique views at the junction of superior articular process and sacral ala on the _B__ sides to access the __L5__ medial branches. Then, 0.5 cc of 0.75% MARCAINE  was injected after negative aspiration for blood or CSF. Needle pulled out intact. Exact similar procedure was performed at the junction of superior articular process and transverse process at     L4,L5__ vertebral levels to numb the  L3,L4__ medial branches. Patient tolerated procedure well and discharge instructions were given.   NO PAIN RELIEF     ESTIMATED BLOOD LOSS:  Less than 1 cc       Pulse Ox Monitoring was done.            ________________________________                   Mary Wahl M.D.

## 2022-09-09 ENCOUNTER — TELEPHONE (OUTPATIENT)
Dept: ORTHOPEDIC SURGERY | Age: 87
End: 2022-09-09

## 2022-09-09 NOTE — TELEPHONE ENCOUNTER
General Question     Subject: OV NOTE/RT SHLDR 12/6/21  Patient and /or Facility Request: Prasad Portillo  Contact Number: [phone]    Patient is req a return call regarding documentation from visit with Dr. Tori Helm. Patient is needing in writing that she does have a rotator cuff tear in the right shoulder. Please return call to patient at the above number.

## 2022-09-12 NOTE — TELEPHONE ENCOUNTER
General Question     Subject: CHECKING ON STATUS OF MSG  Patient and /or Facility Request: Lela Kramer  Contact Number: [phone]    Pt MAKING SURE HER MSG WAS RCV'D AND IS ASKING FOR A CALL BACK REGARDING STATUS AT THE # ABOVE.

## 2022-09-12 NOTE — TELEPHONE ENCOUNTER
Dr. Ju Ferguson spoke with patient personally. Patient was wanting documentation starting she has a hairline RTC tear in her right shoulder for insurance purposes. Dr. Ju Ferguson talked to patient regarding last OV notes and findings at last visit which didn't support what patient was asking.   KB

## 2022-09-15 ENCOUNTER — HOSPITAL ENCOUNTER (OUTPATIENT)
Age: 87
Setting detail: OUTPATIENT SURGERY
Discharge: HOME | End: 2022-09-15
Attending: PAIN MEDICINE | Admitting: PAIN MEDICINE
Payer: MEDICARE

## 2022-09-15 VITALS
TEMPERATURE: 98.6 F | WEIGHT: 244 LBS | OXYGEN SATURATION: 94 % | BODY MASS INDEX: 44.9 KG/M2 | RESPIRATION RATE: 16 BRPM | SYSTOLIC BLOOD PRESSURE: 170 MMHG | HEART RATE: 67 BPM | DIASTOLIC BLOOD PRESSURE: 62 MMHG | HEIGHT: 62 IN

## 2022-09-15 PROBLEM — M46.1 SACROILIITIS, NOT ELSEWHERE CLASSIFIED (HCC): Status: ACTIVE | Noted: 2022-09-15

## 2022-09-15 LAB
GLUCOSE BLD-MCNC: 160 MG/DL (ref 70–99)
PERFORMED ON: ABNORMAL

## 2022-09-15 PROCEDURE — 7100000011 HC PHASE II RECOVERY - ADDTL 15 MIN: Performed by: PAIN MEDICINE

## 2022-09-15 PROCEDURE — 6360000002 HC RX W HCPCS: Performed by: PAIN MEDICINE

## 2022-09-15 PROCEDURE — 2580000003 HC RX 258: Performed by: PAIN MEDICINE

## 2022-09-15 PROCEDURE — 2500000003 HC RX 250 WO HCPCS: Performed by: PAIN MEDICINE

## 2022-09-15 PROCEDURE — 27096 INJECT SACROILIAC JOINT: CPT | Performed by: PAIN MEDICINE

## 2022-09-15 PROCEDURE — 99152 MOD SED SAME PHYS/QHP 5/>YRS: CPT | Performed by: PAIN MEDICINE

## 2022-09-15 PROCEDURE — 3600000002 HC SURGERY LEVEL 2 BASE: Performed by: PAIN MEDICINE

## 2022-09-15 PROCEDURE — 3600000012 HC SURGERY LEVEL 2 ADDTL 15MIN: Performed by: PAIN MEDICINE

## 2022-09-15 PROCEDURE — 7100000010 HC PHASE II RECOVERY - FIRST 15 MIN: Performed by: PAIN MEDICINE

## 2022-09-15 RX ORDER — SODIUM CHLORIDE, SODIUM LACTATE, POTASSIUM CHLORIDE, CALCIUM CHLORIDE 600; 310; 30; 20 MG/100ML; MG/100ML; MG/100ML; MG/100ML
INJECTION, SOLUTION INTRAVENOUS CONTINUOUS
Status: DISCONTINUED | OUTPATIENT
Start: 2022-09-15 | End: 2022-09-15 | Stop reason: HOSPADM

## 2022-09-15 RX ORDER — MIDAZOLAM HYDROCHLORIDE 1 MG/ML
INJECTION INTRAMUSCULAR; INTRAVENOUS
Status: DISCONTINUED
Start: 2022-09-15 | End: 2022-09-15 | Stop reason: HOSPADM

## 2022-09-15 RX ORDER — MIDAZOLAM HYDROCHLORIDE 1 MG/ML
INJECTION INTRAMUSCULAR; INTRAVENOUS PRN
Status: DISCONTINUED | OUTPATIENT
Start: 2022-09-15 | End: 2022-09-15 | Stop reason: ALTCHOICE

## 2022-09-15 RX ORDER — BETAMETHASONE SODIUM PHOSPHATE AND BETAMETHASONE ACETATE 3; 3 MG/ML; MG/ML
INJECTION, SUSPENSION INTRA-ARTICULAR; INTRALESIONAL; INTRAMUSCULAR; SOFT TISSUE
Status: DISCONTINUED
Start: 2022-09-15 | End: 2022-09-15 | Stop reason: HOSPADM

## 2022-09-15 RX ORDER — LIDOCAINE HYDROCHLORIDE 10 MG/ML
INJECTION, SOLUTION INFILTRATION; PERINEURAL PRN
Status: DISCONTINUED | OUTPATIENT
Start: 2022-09-15 | End: 2022-09-15 | Stop reason: ALTCHOICE

## 2022-09-15 RX ADMIN — SODIUM CHLORIDE, POTASSIUM CHLORIDE, SODIUM LACTATE AND CALCIUM CHLORIDE: 600; 310; 30; 20 INJECTION, SOLUTION INTRAVENOUS at 13:48

## 2022-09-15 ASSESSMENT — PAIN SCALES - GENERAL
PAINLEVEL_OUTOF10: 5
PAINLEVEL_OUTOF10: 5

## 2022-09-15 ASSESSMENT — PAIN DESCRIPTION - DESCRIPTORS: DESCRIPTORS: ACHING;SHARP

## 2022-09-15 NOTE — PROGRESS NOTES
Discharge  instructions reviewed. Pt and family verbalize understanding with no further questions. VSS. Pt discharged via [facility] to car. Assessment unchanged.

## 2022-09-15 NOTE — PROCEDURES
Lis Ames is a 82 y.o. female patient. No diagnosis found. Past Medical History:   Diagnosis Date    Breast cancer ([facility]     Cancer ([facility]     [facility]     GERD (gastroesophageal reflux disease)     History of therapeutic radiation     Hyperlipidemia     Hypertension     Influenza A 12/19/2017    Kidney stone      Blood pressure (!) 152/76, pulse 74, temperature 98.6 °F (37 °C), temperature source Temporal, resp. rate 20, height 5' 2\" (1.575 m), weight 244 lb (110.7 kg), SpO2 95 %. 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 With the patient in the prone position, the back was prepped with Prevail and draped. Aseptic technique was used at every stage of the procedure. Flouroscopic angulation was used to separate the medial and lateral joint lines of the posterior aspect of the sacroiliac joint  Skin infiltration was with 0.5 cc of 1% Lidocaine using a 30-gauge needle followed by placement of a _22__ -gauge needle _3.5__ -inch needle using fluoroscopic guidance in the posterior inferior aspect of the _R__ side medial sacroiliac joint line. Aspiration was negative for CSF or blood . __1.5__cc of _1__ % of lidocaine with _40__ mg DEPOMEDROL was injected and the needle was pulled out intact. The patient tolerated the procedure well and discharge instructions were given.   NO DYE    ESTIMATED BLOOD LOSS:  Less than 1 cc                ________________________________                   Jorge Alberto Barlow M.D.

## 2022-09-15 NOTE — DISCHARGE INSTRUCTIONS
[address] [place] [address] Road                    [phone]                        Post Pain Management Injection                                          1)  PATIENT INSTRUCTIONS: - Resume Normal Diet         - Other                         2)  ACTIVITY: * No driving or operating machinery for 8 hours post procedure without sedation      and 24 hours with sedation. If you are seen driving during this time the        proper authorities will be notified. * Do not stay alone for 4-6 hours after the procedure       * Do not sign legal documents, make any major decisions, or be involved in       work decisions for the remainder of the day. - May shower or bathe          - Resume normal activity when full movement/sensation has       returned in extremities                   3)  SITE CARE: - Observe puncture site for signs of infection (redness, warmth       swelling, drainage with a foul odor, fever or increased tenderness)                4)  EXPECTED SIDE EFFECTS: * Numbness/tingling/weakness in extremities, if this lasts more       than 6 hours notify Dr. Tim Luna        - Muscle stiffness, soreness at puncture site (soreness may       last 2-4 days)                    5)  DIABETIC PATIENTS ONLY    - Increased glucose levels in all diabetic patients who have received       a steroid injection.               - Monitor blood sugars frequently for the first 5 days following procedure. Adjust medication accordingly. 6)  TO REACH DR. Elizabeth Cope: - Call [phone]                    7)  ADDITIONAL INSTRUCTIONS: - Follow-up as scheduled or call for appointment if not already done. - Patients taking Coumadin may resume taking as before the procedure.                  I have received and understand these instructions                     _____________________________        ___________________________      ________     ________ _____________________________        ___________________________      ________     ________                                      _________      _________

## 2022-10-03 ENCOUNTER — HOSPITAL ENCOUNTER (OUTPATIENT)
Dept: WOMENS IMAGING | Age: 87
Discharge: HOME | End: 2022-10-03
Payer: MEDICARE

## 2022-10-03 DIAGNOSIS — Z12.31 SCREENING MAMMOGRAM FOR BREAST CANCER: ICD-10-CM

## 2022-10-03 PROCEDURE — 77063 BREAST TOMOSYNTHESIS BI: CPT

## 2023-05-26 ENCOUNTER — HOSPITAL ENCOUNTER (INPATIENT)
Age: 88
LOS: 6 days | Discharge: HOME HEALTH | DRG: 308 | End: 2023-06-01
Attending: EMERGENCY MEDICINE | Admitting: INTERNAL MEDICINE
Payer: MEDICARE

## 2023-05-26 ENCOUNTER — APPOINTMENT (OUTPATIENT)
Dept: GENERAL RADIOLOGY | Age: 88
DRG: 308 | End: 2023-05-26
Payer: MEDICARE

## 2023-05-26 DIAGNOSIS — I48.91 ATRIAL FIBRILLATION WITH RAPID VENTRICULAR RESPONSE (HCC): Primary | ICD-10-CM

## 2023-05-26 DIAGNOSIS — R11.0 NAUSEA: ICD-10-CM

## 2023-05-26 DIAGNOSIS — E83.42 HYPOMAGNESEMIA: ICD-10-CM

## 2023-05-26 DIAGNOSIS — E87.6 HYPOKALEMIA: ICD-10-CM

## 2023-05-26 LAB
ALBUMIN SERPL-MCNC: 4 G/DL (ref 3.4–5)
ALBUMIN/GLOB SERPL: 1.3 {RATIO} (ref 1.1–2.2)
ALP SERPL-CCNC: 66 U/L (ref 40–129)
ALT SERPL-CCNC: 15 U/L (ref 10–40)
ANION GAP SERPL CALCULATED.3IONS-SCNC: 16 MMOL/L (ref 3–16)
AST SERPL-CCNC: 18 U/L (ref 15–37)
BASOPHILS # BLD: 0.1 K/UL (ref 0–0.2)
BASOPHILS NFR BLD: 0.9 %
BILIRUB SERPL-MCNC: 0.8 MG/DL (ref 0–1)
BUN SERPL-MCNC: 14 MG/DL (ref 7–20)
CALCIUM SERPL-MCNC: 8.8 MG/DL (ref 8.3–10.6)
CHLORIDE SERPL-SCNC: 80 MMOL/L (ref 99–110)
CO2 SERPL-SCNC: 24 MMOL/L (ref 21–32)
CREAT SERPL-MCNC: 1 MG/DL (ref 0.6–1.2)
DEPRECATED RDW RBC AUTO: 16 % (ref 12.4–15.4)
EKG ATRIAL RATE: 136 BPM
EKG DIAGNOSIS: NORMAL
EKG Q-T INTERVAL: 364 MS
EKG QRS DURATION: 90 MS
EKG QTC CALCULATION (BAZETT): 487 MS
EKG R AXIS: -6 DEGREES
EKG T AXIS: 64 DEGREES
EKG VENTRICULAR RATE: 108 BPM
EOSINOPHIL # BLD: 0.1 K/UL (ref 0–0.6)
EOSINOPHIL NFR BLD: 0.8 %
GFR SERPLBLD CREATININE-BSD FMLA CKD-EPI: 55 ML/MIN/{1.73_M2}
GLUCOSE BLD-MCNC: 242 MG/DL (ref 70–99)
GLUCOSE BLD-MCNC: 47 MG/DL (ref 70–99)
GLUCOSE BLD-MCNC: 62 MG/DL (ref 70–99)
GLUCOSE BLD-MCNC: 96 MG/DL (ref 70–99)
GLUCOSE SERPL-MCNC: 81 MG/DL (ref 70–99)
HCT VFR BLD AUTO: 29.4 % (ref 36–48)
HGB BLD-MCNC: 9.5 G/DL (ref 12–16)
LYMPHOCYTES # BLD: 0.9 K/UL (ref 1–5.1)
LYMPHOCYTES NFR BLD: 8.4 %
MAGNESIUM SERPL-MCNC: 1 MG/DL (ref 1.8–2.4)
MCH RBC QN AUTO: 24.2 PG (ref 26–34)
MCHC RBC AUTO-ENTMCNC: 32.2 G/DL (ref 31–36)
MCV RBC AUTO: 75.1 FL (ref 80–100)
MONOCYTES # BLD: 0.8 K/UL (ref 0–1.3)
MONOCYTES NFR BLD: 7.6 %
NEUTROPHILS # BLD: 8.5 K/UL (ref 1.7–7.7)
NEUTROPHILS NFR BLD: 82.3 %
NT-PROBNP SERPL-MCNC: 3605 PG/ML (ref 0–449)
PERFORMED ON: ABNORMAL
PERFORMED ON: NORMAL
PLATELET # BLD AUTO: 399 K/UL (ref 135–450)
PMV BLD AUTO: 7.5 FL (ref 5–10.5)
POTASSIUM SERPL-SCNC: 2.8 MMOL/L (ref 3.5–5.1)
PROT SERPL-MCNC: 7.1 G/DL (ref 6.4–8.2)
RBC # BLD AUTO: 3.92 M/UL (ref 4–5.2)
SODIUM SERPL-SCNC: 120 MMOL/L (ref 136–145)
TROPONIN, HIGH SENSITIVITY: 23 NG/L (ref 0–14)
TROPONIN, HIGH SENSITIVITY: 23 NG/L (ref 0–14)
TROPONIN, HIGH SENSITIVITY: 24 NG/L (ref 0–14)
TROPONIN, HIGH SENSITIVITY: 28 NG/L (ref 0–14)
WBC # BLD AUTO: 10.3 K/UL (ref 4–11)

## 2023-05-26 PROCEDURE — 6370000000 HC RX 637 (ALT 250 FOR IP)

## 2023-05-26 PROCEDURE — 96375 TX/PRO/DX INJ NEW DRUG ADDON: CPT

## 2023-05-26 PROCEDURE — 93010 ELECTROCARDIOGRAM REPORT: CPT | Performed by: INTERNAL MEDICINE

## 2023-05-26 PROCEDURE — 84484 ASSAY OF TROPONIN QUANT: CPT

## 2023-05-26 PROCEDURE — 2500000003 HC RX 250 WO HCPCS

## 2023-05-26 PROCEDURE — 2580000003 HC RX 258

## 2023-05-26 PROCEDURE — 83880 ASSAY OF NATRIURETIC PEPTIDE: CPT

## 2023-05-26 PROCEDURE — 80053 COMPREHEN METABOLIC PANEL: CPT

## 2023-05-26 PROCEDURE — 93005 ELECTROCARDIOGRAM TRACING: CPT | Performed by: EMERGENCY MEDICINE

## 2023-05-26 PROCEDURE — 96376 TX/PRO/DX INJ SAME DRUG ADON: CPT

## 2023-05-26 PROCEDURE — 71045 X-RAY EXAM CHEST 1 VIEW: CPT

## 2023-05-26 PROCEDURE — 6370000000 HC RX 637 (ALT 250 FOR IP): Performed by: INTERNAL MEDICINE

## 2023-05-26 PROCEDURE — 2060000000 HC ICU INTERMEDIATE R&B

## 2023-05-26 PROCEDURE — 96372 THER/PROPH/DIAG INJ SC/IM: CPT

## 2023-05-26 PROCEDURE — 85025 COMPLETE CBC W/AUTO DIFF WBC: CPT

## 2023-05-26 PROCEDURE — 99285 EMERGENCY DEPT VISIT HI MDM: CPT

## 2023-05-26 PROCEDURE — 96365 THER/PROPH/DIAG IV INF INIT: CPT

## 2023-05-26 PROCEDURE — 1200000000 HC SEMI PRIVATE

## 2023-05-26 PROCEDURE — 96366 THER/PROPH/DIAG IV INF ADDON: CPT

## 2023-05-26 PROCEDURE — 36415 COLL VENOUS BLD VENIPUNCTURE: CPT

## 2023-05-26 PROCEDURE — 84443 ASSAY THYROID STIM HORMONE: CPT

## 2023-05-26 PROCEDURE — 6360000002 HC RX W HCPCS

## 2023-05-26 PROCEDURE — 6360000002 HC RX W HCPCS: Performed by: EMERGENCY MEDICINE

## 2023-05-26 PROCEDURE — 83735 ASSAY OF MAGNESIUM: CPT

## 2023-05-26 RX ORDER — POTASSIUM CHLORIDE 7.45 MG/ML
10 INJECTION INTRAVENOUS
Status: COMPLETED | OUTPATIENT
Start: 2023-05-26 | End: 2023-05-26

## 2023-05-26 RX ORDER — ACETAMINOPHEN 325 MG/1
650 TABLET ORAL EVERY 6 HOURS PRN
Status: DISCONTINUED | OUTPATIENT
Start: 2023-05-26 | End: 2023-06-01 | Stop reason: HOSPADM

## 2023-05-26 RX ORDER — PANTOPRAZOLE SODIUM 20 MG/1
20 TABLET, DELAYED RELEASE ORAL DAILY
Status: DISCONTINUED | OUTPATIENT
Start: 2023-05-26 | End: 2023-05-29

## 2023-05-26 RX ORDER — PRAVASTATIN SODIUM 20 MG
20 TABLET ORAL NIGHTLY
Status: DISCONTINUED | OUTPATIENT
Start: 2023-05-26 | End: 2023-06-01 | Stop reason: HOSPADM

## 2023-05-26 RX ORDER — ONDANSETRON 4 MG/1
4 TABLET, ORALLY DISINTEGRATING ORAL EVERY 8 HOURS PRN
Status: DISCONTINUED | OUTPATIENT
Start: 2023-05-26 | End: 2023-06-01 | Stop reason: HOSPADM

## 2023-05-26 RX ORDER — METOPROLOL TARTRATE 50 MG/1
50 TABLET, FILM COATED ORAL 2 TIMES DAILY
Status: ON HOLD | COMMUNITY
Start: 2023-05-18 | End: 2023-06-01 | Stop reason: HOSPADM

## 2023-05-26 RX ORDER — SODIUM CHLORIDE 0.9 % (FLUSH) 0.9 %
5-40 SYRINGE (ML) INJECTION EVERY 12 HOURS SCHEDULED
Status: DISCONTINUED | OUTPATIENT
Start: 2023-05-26 | End: 2023-06-01 | Stop reason: HOSPADM

## 2023-05-26 RX ORDER — NITROFURANTOIN 25; 75 MG/1; MG/1
CAPSULE ORAL
Status: ON HOLD | COMMUNITY
Start: 2023-05-20 | End: 2023-06-01 | Stop reason: HOSPADM

## 2023-05-26 RX ORDER — INSULIN LISPRO 100 [IU]/ML
0-4 INJECTION, SOLUTION INTRAVENOUS; SUBCUTANEOUS NIGHTLY
Status: DISCONTINUED | OUTPATIENT
Start: 2023-05-26 | End: 2023-05-27

## 2023-05-26 RX ORDER — ONDANSETRON 2 MG/ML
4 INJECTION INTRAMUSCULAR; INTRAVENOUS EVERY 6 HOURS PRN
Status: DISCONTINUED | OUTPATIENT
Start: 2023-05-26 | End: 2023-06-01 | Stop reason: HOSPADM

## 2023-05-26 RX ORDER — PROMETHAZINE HYDROCHLORIDE 25 MG/ML
12.5 INJECTION, SOLUTION INTRAMUSCULAR; INTRAVENOUS ONCE
Status: COMPLETED | OUTPATIENT
Start: 2023-05-26 | End: 2023-05-26

## 2023-05-26 RX ORDER — INSULIN LISPRO 100 [IU]/ML
0-8 INJECTION, SOLUTION INTRAVENOUS; SUBCUTANEOUS
Status: DISCONTINUED | OUTPATIENT
Start: 2023-05-26 | End: 2023-05-27

## 2023-05-26 RX ORDER — POLYETHYLENE GLYCOL 3350 17 G/17G
17 POWDER, FOR SOLUTION ORAL DAILY PRN
Status: DISCONTINUED | OUTPATIENT
Start: 2023-05-26 | End: 2023-06-01 | Stop reason: HOSPADM

## 2023-05-26 RX ORDER — METOPROLOL TARTRATE 50 MG/1
50 TABLET, FILM COATED ORAL 2 TIMES DAILY
Status: DISCONTINUED | OUTPATIENT
Start: 2023-05-26 | End: 2023-05-29

## 2023-05-26 RX ORDER — SODIUM CHLORIDE 9 MG/ML
INJECTION, SOLUTION INTRAVENOUS PRN
Status: DISCONTINUED | OUTPATIENT
Start: 2023-05-26 | End: 2023-06-01 | Stop reason: HOSPADM

## 2023-05-26 RX ORDER — HYDROCHLOROTHIAZIDE 25 MG/1
25 TABLET ORAL EVERY MORNING
Status: CANCELLED | OUTPATIENT
Start: 2023-05-26

## 2023-05-26 RX ORDER — INSULIN GLARGINE 100 [IU]/ML
32 INJECTION, SOLUTION SUBCUTANEOUS 2 TIMES DAILY
Status: DISCONTINUED | OUTPATIENT
Start: 2023-05-26 | End: 2023-05-27

## 2023-05-26 RX ORDER — ACETAMINOPHEN 650 MG/1
650 SUPPOSITORY RECTAL EVERY 6 HOURS PRN
Status: DISCONTINUED | OUTPATIENT
Start: 2023-05-26 | End: 2023-06-01 | Stop reason: HOSPADM

## 2023-05-26 RX ORDER — DEXTROSE MONOHYDRATE 100 MG/ML
INJECTION, SOLUTION INTRAVENOUS CONTINUOUS PRN
Status: DISCONTINUED | OUTPATIENT
Start: 2023-05-26 | End: 2023-06-01 | Stop reason: HOSPADM

## 2023-05-26 RX ORDER — SODIUM CHLORIDE 0.9 % (FLUSH) 0.9 %
5-40 SYRINGE (ML) INJECTION PRN
Status: DISCONTINUED | OUTPATIENT
Start: 2023-05-26 | End: 2023-06-01 | Stop reason: HOSPADM

## 2023-05-26 RX ORDER — MAGNESIUM SULFATE IN WATER 40 MG/ML
2000 INJECTION, SOLUTION INTRAVENOUS ONCE
Status: COMPLETED | OUTPATIENT
Start: 2023-05-26 | End: 2023-05-26

## 2023-05-26 RX ADMIN — INSULIN GLARGINE 32 UNITS: 100 INJECTION, SOLUTION SUBCUTANEOUS at 20:52

## 2023-05-26 RX ADMIN — Medication 16 G: at 17:43

## 2023-05-26 RX ADMIN — SODIUM CHLORIDE 5 MG/HR: 900 INJECTION, SOLUTION INTRAVENOUS at 10:50

## 2023-05-26 RX ADMIN — PRAVASTATIN SODIUM 20 MG: 20 TABLET ORAL at 20:51

## 2023-05-26 RX ADMIN — PROMETHAZINE HYDROCHLORIDE 12.5 MG: 25 INJECTION INTRAMUSCULAR; INTRAVENOUS at 12:18

## 2023-05-26 RX ADMIN — POTASSIUM CHLORIDE 10 MEQ: 7.46 INJECTION, SOLUTION INTRAVENOUS at 13:11

## 2023-05-26 RX ADMIN — MAGNESIUM SULFATE HEPTAHYDRATE 2000 MG: 40 INJECTION, SOLUTION INTRAVENOUS at 11:33

## 2023-05-26 RX ADMIN — Medication 10 ML: at 20:52

## 2023-05-26 RX ADMIN — APIXABAN 5 MG: 5 TABLET, FILM COATED ORAL at 20:51

## 2023-05-26 RX ADMIN — METOPROLOL TARTRATE 50 MG: 50 TABLET, FILM COATED ORAL at 20:51

## 2023-05-26 RX ADMIN — POTASSIUM CHLORIDE 10 MEQ: 7.46 INJECTION, SOLUTION INTRAVENOUS at 11:32

## 2023-05-26 RX ADMIN — ACETAMINOPHEN 325MG 650 MG: 325 TABLET ORAL at 23:46

## 2023-05-26 RX ADMIN — SODIUM CHLORIDE 5 MG/HR: 900 INJECTION, SOLUTION INTRAVENOUS at 23:49

## 2023-05-26 ASSESSMENT — PAIN SCALES - GENERAL: PAINLEVEL_OUTOF10: 3

## 2023-05-26 ASSESSMENT — PAIN DESCRIPTION - FREQUENCY: FREQUENCY: CONTINUOUS

## 2023-05-26 ASSESSMENT — PAIN DESCRIPTION - LOCATION: LOCATION: KNEE

## 2023-05-26 ASSESSMENT — PAIN DESCRIPTION - PAIN TYPE: TYPE: CHRONIC PAIN

## 2023-05-26 ASSESSMENT — PAIN DESCRIPTION - DESCRIPTORS: DESCRIPTORS: ACHING;DISCOMFORT

## 2023-05-26 ASSESSMENT — PAIN - FUNCTIONAL ASSESSMENT: PAIN_FUNCTIONAL_ASSESSMENT: PREVENTS OR INTERFERES SOME ACTIVE ACTIVITIES AND ADLS

## 2023-05-26 ASSESSMENT — PAIN DESCRIPTION - ONSET: ONSET: ON-GOING

## 2023-05-26 ASSESSMENT — PAIN DESCRIPTION - ORIENTATION: ORIENTATION: RIGHT

## 2023-05-27 PROBLEM — E87.6 HYPOKALEMIA: Status: ACTIVE | Noted: 2023-05-27

## 2023-05-27 PROBLEM — E83.42 HYPOMAGNESEMIA: Status: ACTIVE | Noted: 2023-05-27

## 2023-05-27 PROBLEM — R11.0 NAUSEA: Status: ACTIVE | Noted: 2023-05-27

## 2023-05-27 LAB
ANION GAP SERPL CALCULATED.3IONS-SCNC: 14 MMOL/L (ref 3–16)
BUN SERPL-MCNC: 13 MG/DL (ref 7–20)
CALCIUM SERPL-MCNC: 8.9 MG/DL (ref 8.3–10.6)
CHLORIDE SERPL-SCNC: 85 MMOL/L (ref 99–110)
CO2 SERPL-SCNC: 28 MMOL/L (ref 21–32)
CREAT SERPL-MCNC: 1 MG/DL (ref 0.6–1.2)
DEPRECATED RDW RBC AUTO: 16.4 % (ref 12.4–15.4)
GFR SERPLBLD CREATININE-BSD FMLA CKD-EPI: 55 ML/MIN/{1.73_M2}
GLUCOSE BLD-MCNC: 153 MG/DL (ref 70–99)
GLUCOSE BLD-MCNC: 179 MG/DL (ref 70–99)
GLUCOSE BLD-MCNC: 290 MG/DL (ref 70–99)
GLUCOSE BLD-MCNC: 80 MG/DL (ref 70–99)
GLUCOSE SERPL-MCNC: 91 MG/DL (ref 70–99)
HCT VFR BLD AUTO: 30.1 % (ref 36–48)
HGB BLD-MCNC: 9.4 G/DL (ref 12–16)
INR PPP: 1.58 (ref 0.84–1.16)
LV EF: 40 %
LVEF MODALITY: NORMAL
MAGNESIUM SERPL-MCNC: 1.6 MG/DL (ref 1.8–2.4)
MCH RBC QN AUTO: 23.5 PG (ref 26–34)
MCHC RBC AUTO-ENTMCNC: 31.2 G/DL (ref 31–36)
MCV RBC AUTO: 75.2 FL (ref 80–100)
PERFORMED ON: ABNORMAL
PERFORMED ON: NORMAL
PLATELET # BLD AUTO: 376 K/UL (ref 135–450)
PMV BLD AUTO: 7.8 FL (ref 5–10.5)
POTASSIUM SERPL-SCNC: 2.9 MMOL/L (ref 3.5–5.1)
PROTHROMBIN TIME: 18.8 SEC (ref 11.5–14.8)
RBC # BLD AUTO: 4.01 M/UL (ref 4–5.2)
SODIUM SERPL-SCNC: 127 MMOL/L (ref 136–145)
TSH SERPL DL<=0.005 MIU/L-ACNC: 0.99 UIU/ML (ref 0.27–4.2)
WBC # BLD AUTO: 11.2 K/UL (ref 4–11)

## 2023-05-27 PROCEDURE — 6370000000 HC RX 637 (ALT 250 FOR IP)

## 2023-05-27 PROCEDURE — 2700000000 HC OXYGEN THERAPY PER DAY

## 2023-05-27 PROCEDURE — 94761 N-INVAS EAR/PLS OXIMETRY MLT: CPT

## 2023-05-27 PROCEDURE — 36415 COLL VENOUS BLD VENIPUNCTURE: CPT

## 2023-05-27 PROCEDURE — 1200000000 HC SEMI PRIVATE

## 2023-05-27 PROCEDURE — 93306 TTE W/DOPPLER COMPLETE: CPT

## 2023-05-27 PROCEDURE — 83735 ASSAY OF MAGNESIUM: CPT

## 2023-05-27 PROCEDURE — 2580000003 HC RX 258

## 2023-05-27 PROCEDURE — 85610 PROTHROMBIN TIME: CPT

## 2023-05-27 PROCEDURE — 85027 COMPLETE CBC AUTOMATED: CPT

## 2023-05-27 PROCEDURE — 6370000000 HC RX 637 (ALT 250 FOR IP): Performed by: INTERNAL MEDICINE

## 2023-05-27 PROCEDURE — 80048 BASIC METABOLIC PNL TOTAL CA: CPT

## 2023-05-27 PROCEDURE — 99222 1ST HOSP IP/OBS MODERATE 55: CPT | Performed by: INTERNAL MEDICINE

## 2023-05-27 PROCEDURE — 6360000002 HC RX W HCPCS: Performed by: INTERNAL MEDICINE

## 2023-05-27 RX ORDER — INSULIN GLARGINE 100 [IU]/ML
30 INJECTION, SOLUTION SUBCUTANEOUS 2 TIMES DAILY
Status: DISCONTINUED | OUTPATIENT
Start: 2023-05-27 | End: 2023-05-27

## 2023-05-27 RX ORDER — OXYCODONE HYDROCHLORIDE AND ACETAMINOPHEN 5; 325 MG/1; MG/1
1 TABLET ORAL EVERY 6 HOURS PRN
Status: DISCONTINUED | OUTPATIENT
Start: 2023-05-27 | End: 2023-06-01 | Stop reason: HOSPADM

## 2023-05-27 RX ORDER — INSULIN LISPRO 100 [IU]/ML
0-4 INJECTION, SOLUTION INTRAVENOUS; SUBCUTANEOUS
Status: DISCONTINUED | OUTPATIENT
Start: 2023-05-27 | End: 2023-06-01 | Stop reason: HOSPADM

## 2023-05-27 RX ORDER — MAGNESIUM SULFATE IN WATER 40 MG/ML
2000 INJECTION, SOLUTION INTRAVENOUS PRN
Status: DISCONTINUED | OUTPATIENT
Start: 2023-05-27 | End: 2023-06-01 | Stop reason: HOSPADM

## 2023-05-27 RX ORDER — POTASSIUM CHLORIDE 7.45 MG/ML
10 INJECTION INTRAVENOUS
Status: DISPENSED | OUTPATIENT
Start: 2023-05-27 | End: 2023-05-27

## 2023-05-27 RX ORDER — INSULIN GLARGINE 100 [IU]/ML
24 INJECTION, SOLUTION SUBCUTANEOUS 2 TIMES DAILY
Status: DISCONTINUED | OUTPATIENT
Start: 2023-05-27 | End: 2023-05-28

## 2023-05-27 RX ORDER — INSULIN LISPRO 100 [IU]/ML
0-4 INJECTION, SOLUTION INTRAVENOUS; SUBCUTANEOUS NIGHTLY
Status: DISCONTINUED | OUTPATIENT
Start: 2023-05-27 | End: 2023-06-01 | Stop reason: HOSPADM

## 2023-05-27 RX ORDER — POTASSIUM CHLORIDE 7.45 MG/ML
10 INJECTION INTRAVENOUS PRN
Status: DISCONTINUED | OUTPATIENT
Start: 2023-05-27 | End: 2023-06-01 | Stop reason: HOSPADM

## 2023-05-27 RX ORDER — POTASSIUM CHLORIDE 20 MEQ/1
40 TABLET, EXTENDED RELEASE ORAL ONCE
Status: COMPLETED | OUTPATIENT
Start: 2023-05-27 | End: 2023-05-27

## 2023-05-27 RX ORDER — POTASSIUM CHLORIDE 20 MEQ/1
40 TABLET, EXTENDED RELEASE ORAL PRN
Status: DISCONTINUED | OUTPATIENT
Start: 2023-05-27 | End: 2023-06-01 | Stop reason: HOSPADM

## 2023-05-27 RX ORDER — LANOLIN ALCOHOL/MO/W.PET/CERES
400 CREAM (GRAM) TOPICAL ONCE
Status: COMPLETED | OUTPATIENT
Start: 2023-05-27 | End: 2023-05-27

## 2023-05-27 RX ADMIN — PANTOPRAZOLE SODIUM 20 MG: 20 TABLET, DELAYED RELEASE ORAL at 08:14

## 2023-05-27 RX ADMIN — METOPROLOL TARTRATE 50 MG: 50 TABLET, FILM COATED ORAL at 08:14

## 2023-05-27 RX ADMIN — POTASSIUM BICARBONATE 40 MEQ: 782 TABLET, EFFERVESCENT ORAL at 14:23

## 2023-05-27 RX ADMIN — Medication 400 MG: at 10:21

## 2023-05-27 RX ADMIN — POTASSIUM CHLORIDE 40 MEQ: 1500 TABLET, EXTENDED RELEASE ORAL at 10:22

## 2023-05-27 RX ADMIN — APIXABAN 5 MG: 5 TABLET, FILM COATED ORAL at 08:14

## 2023-05-27 RX ADMIN — INSULIN GLARGINE 24 UNITS: 100 INJECTION, SOLUTION SUBCUTANEOUS at 20:34

## 2023-05-27 RX ADMIN — PRAVASTATIN SODIUM 20 MG: 20 TABLET ORAL at 20:33

## 2023-05-27 RX ADMIN — METOPROLOL TARTRATE 50 MG: 50 TABLET, FILM COATED ORAL at 20:33

## 2023-05-27 RX ADMIN — APIXABAN 5 MG: 5 TABLET, FILM COATED ORAL at 20:33

## 2023-05-27 RX ADMIN — INSULIN GLARGINE 32 UNITS: 100 INJECTION, SOLUTION SUBCUTANEOUS at 08:14

## 2023-05-27 RX ADMIN — POTASSIUM CHLORIDE 10 MEQ: 7.46 INJECTION, SOLUTION INTRAVENOUS at 12:17

## 2023-05-27 RX ADMIN — Medication 10 ML: at 20:33

## 2023-05-27 RX ADMIN — Medication 10 ML: at 08:14

## 2023-05-27 ASSESSMENT — PAIN SCALES - GENERAL: PAINLEVEL_OUTOF10: 2

## 2023-05-28 LAB
ALBUMIN SERPL-MCNC: 3.9 G/DL (ref 3.4–5)
ALBUMIN/GLOB SERPL: 1.6 {RATIO} (ref 1.1–2.2)
ALP SERPL-CCNC: 64 U/L (ref 40–129)
ALT SERPL-CCNC: 16 U/L (ref 10–40)
ANION GAP SERPL CALCULATED.3IONS-SCNC: 12 MMOL/L (ref 3–16)
AST SERPL-CCNC: 31 U/L (ref 15–37)
BASOPHILS # BLD: 0.1 K/UL (ref 0–0.2)
BASOPHILS NFR BLD: 0.9 %
BILIRUB SERPL-MCNC: 0.7 MG/DL (ref 0–1)
BUN SERPL-MCNC: 16 MG/DL (ref 7–20)
CALCIUM SERPL-MCNC: 9.3 MG/DL (ref 8.3–10.6)
CHLORIDE SERPL-SCNC: 88 MMOL/L (ref 99–110)
CO2 SERPL-SCNC: 30 MMOL/L (ref 21–32)
CREAT SERPL-MCNC: 1 MG/DL (ref 0.6–1.2)
DEPRECATED RDW RBC AUTO: 16.2 % (ref 12.4–15.4)
EOSINOPHIL # BLD: 0.4 K/UL (ref 0–0.6)
EOSINOPHIL NFR BLD: 4.2 %
GFR SERPLBLD CREATININE-BSD FMLA CKD-EPI: 55 ML/MIN/{1.73_M2}
GLUCOSE BLD-MCNC: 154 MG/DL (ref 70–99)
GLUCOSE BLD-MCNC: 189 MG/DL (ref 70–99)
GLUCOSE BLD-MCNC: 237 MG/DL (ref 70–99)
GLUCOSE BLD-MCNC: 85 MG/DL (ref 70–99)
GLUCOSE SERPL-MCNC: 81 MG/DL (ref 70–99)
HCT VFR BLD AUTO: 32.3 % (ref 36–48)
HGB BLD-MCNC: 10.1 G/DL (ref 12–16)
LYMPHOCYTES # BLD: 1.1 K/UL (ref 1–5.1)
LYMPHOCYTES NFR BLD: 12.5 %
MAGNESIUM SERPL-MCNC: 1.5 MG/DL (ref 1.8–2.4)
MCH RBC QN AUTO: 24.2 PG (ref 26–34)
MCHC RBC AUTO-ENTMCNC: 31.3 G/DL (ref 31–36)
MCV RBC AUTO: 77.2 FL (ref 80–100)
MONOCYTES # BLD: 0.8 K/UL (ref 0–1.3)
MONOCYTES NFR BLD: 9.7 %
NEUTROPHILS # BLD: 6.1 K/UL (ref 1.7–7.7)
NEUTROPHILS NFR BLD: 72.7 %
PERFORMED ON: ABNORMAL
PERFORMED ON: NORMAL
PLATELET # BLD AUTO: 338 K/UL (ref 135–450)
PMV BLD AUTO: 7.8 FL (ref 5–10.5)
POTASSIUM SERPL-SCNC: 3.6 MMOL/L (ref 3.5–5.1)
PROT SERPL-MCNC: 6.3 G/DL (ref 6.4–8.2)
RBC # BLD AUTO: 4.18 M/UL (ref 4–5.2)
SODIUM SERPL-SCNC: 130 MMOL/L (ref 136–145)
WBC # BLD AUTO: 8.4 K/UL (ref 4–11)

## 2023-05-28 PROCEDURE — 83735 ASSAY OF MAGNESIUM: CPT

## 2023-05-28 PROCEDURE — 6370000000 HC RX 637 (ALT 250 FOR IP)

## 2023-05-28 PROCEDURE — 1200000000 HC SEMI PRIVATE

## 2023-05-28 PROCEDURE — 2580000003 HC RX 258

## 2023-05-28 PROCEDURE — 99233 SBSQ HOSP IP/OBS HIGH 50: CPT | Performed by: INTERNAL MEDICINE

## 2023-05-28 PROCEDURE — 85025 COMPLETE CBC W/AUTO DIFF WBC: CPT

## 2023-05-28 PROCEDURE — 36415 COLL VENOUS BLD VENIPUNCTURE: CPT

## 2023-05-28 PROCEDURE — 6360000002 HC RX W HCPCS

## 2023-05-28 PROCEDURE — 2700000000 HC OXYGEN THERAPY PER DAY

## 2023-05-28 PROCEDURE — 6370000000 HC RX 637 (ALT 250 FOR IP): Performed by: INTERNAL MEDICINE

## 2023-05-28 PROCEDURE — 94761 N-INVAS EAR/PLS OXIMETRY MLT: CPT

## 2023-05-28 PROCEDURE — 80053 COMPREHEN METABOLIC PANEL: CPT

## 2023-05-28 RX ORDER — INSULIN GLARGINE 100 [IU]/ML
24 INJECTION, SOLUTION SUBCUTANEOUS NIGHTLY
Status: DISCONTINUED | OUTPATIENT
Start: 2023-05-29 | End: 2023-05-29

## 2023-05-28 RX ORDER — LANOLIN ALCOHOL/MO/W.PET/CERES
400 CREAM (GRAM) TOPICAL ONCE
Status: COMPLETED | OUTPATIENT
Start: 2023-05-28 | End: 2023-05-28

## 2023-05-28 RX ADMIN — Medication 400 MG: at 10:53

## 2023-05-28 RX ADMIN — METOPROLOL TARTRATE 50 MG: 50 TABLET, FILM COATED ORAL at 08:45

## 2023-05-28 RX ADMIN — APIXABAN 5 MG: 5 TABLET, FILM COATED ORAL at 08:45

## 2023-05-28 RX ADMIN — PRAVASTATIN SODIUM 20 MG: 20 TABLET ORAL at 21:39

## 2023-05-28 RX ADMIN — MAGNESIUM SULFATE HEPTAHYDRATE 2000 MG: 40 INJECTION, SOLUTION INTRAVENOUS at 10:50

## 2023-05-28 RX ADMIN — Medication 10 ML: at 08:46

## 2023-05-28 RX ADMIN — INSULIN GLARGINE 24 UNITS: 100 INJECTION, SOLUTION SUBCUTANEOUS at 08:45

## 2023-05-28 RX ADMIN — Medication 10 ML: at 21:40

## 2023-05-28 RX ADMIN — PANTOPRAZOLE SODIUM 20 MG: 20 TABLET, DELAYED RELEASE ORAL at 08:45

## 2023-05-28 RX ADMIN — APIXABAN 5 MG: 5 TABLET, FILM COATED ORAL at 21:39

## 2023-05-28 RX ADMIN — METOPROLOL TARTRATE 50 MG: 50 TABLET, FILM COATED ORAL at 21:39

## 2023-05-28 RX ADMIN — POLYETHYLENE GLYCOL 3350 17 G: 17 POWDER, FOR SOLUTION ORAL at 10:45

## 2023-05-29 LAB
ALBUMIN SERPL-MCNC: 3.6 G/DL (ref 3.4–5)
ALBUMIN/GLOB SERPL: 1.2 {RATIO} (ref 1.1–2.2)
ALP SERPL-CCNC: 57 U/L (ref 40–129)
ALT SERPL-CCNC: 19 U/L (ref 10–40)
ANION GAP SERPL CALCULATED.3IONS-SCNC: 12 MMOL/L (ref 3–16)
AST SERPL-CCNC: 34 U/L (ref 15–37)
BASOPHILS # BLD: 0.1 K/UL (ref 0–0.2)
BASOPHILS NFR BLD: 0.9 %
BILIRUB SERPL-MCNC: 0.5 MG/DL (ref 0–1)
BUN SERPL-MCNC: 20 MG/DL (ref 7–20)
CALCIUM SERPL-MCNC: 9.3 MG/DL (ref 8.3–10.6)
CHLORIDE SERPL-SCNC: 86 MMOL/L (ref 99–110)
CO2 SERPL-SCNC: 29 MMOL/L (ref 21–32)
CREAT SERPL-MCNC: 1.1 MG/DL (ref 0.6–1.2)
DEPRECATED RDW RBC AUTO: 16.1 % (ref 12.4–15.4)
EOSINOPHIL # BLD: 0.5 K/UL (ref 0–0.6)
EOSINOPHIL NFR BLD: 4.8 %
GFR SERPLBLD CREATININE-BSD FMLA CKD-EPI: 49 ML/MIN/{1.73_M2}
GLUCOSE BLD-MCNC: 169 MG/DL (ref 70–99)
GLUCOSE BLD-MCNC: 194 MG/DL (ref 70–99)
GLUCOSE BLD-MCNC: 218 MG/DL (ref 70–99)
GLUCOSE BLD-MCNC: 286 MG/DL (ref 70–99)
GLUCOSE SERPL-MCNC: 141 MG/DL (ref 70–99)
HCT VFR BLD AUTO: 29.5 % (ref 36–48)
HGB BLD-MCNC: 9.4 G/DL (ref 12–16)
LYMPHOCYTES # BLD: 1.3 K/UL (ref 1–5.1)
LYMPHOCYTES NFR BLD: 13.5 %
MAGNESIUM SERPL-MCNC: 1.7 MG/DL (ref 1.8–2.4)
MCH RBC QN AUTO: 24.3 PG (ref 26–34)
MCHC RBC AUTO-ENTMCNC: 31.7 G/DL (ref 31–36)
MCV RBC AUTO: 76.5 FL (ref 80–100)
MONOCYTES # BLD: 1 K/UL (ref 0–1.3)
MONOCYTES NFR BLD: 10 %
NEUTROPHILS # BLD: 6.8 K/UL (ref 1.7–7.7)
NEUTROPHILS NFR BLD: 70.8 %
OSMOLALITY SERPL: 280 MOSM/KG (ref 280–301)
PERFORMED ON: ABNORMAL
PLATELET # BLD AUTO: 354 K/UL (ref 135–450)
PMV BLD AUTO: 7.8 FL (ref 5–10.5)
POTASSIUM SERPL-SCNC: 4 MMOL/L (ref 3.5–5.1)
PROT SERPL-MCNC: 6.5 G/DL (ref 6.4–8.2)
RBC # BLD AUTO: 3.85 M/UL (ref 4–5.2)
SODIUM SERPL-SCNC: 127 MMOL/L (ref 136–145)
URATE SERPL-MCNC: 6.7 MG/DL (ref 2.6–6)
WBC # BLD AUTO: 9.7 K/UL (ref 4–11)

## 2023-05-29 PROCEDURE — 85025 COMPLETE CBC W/AUTO DIFF WBC: CPT

## 2023-05-29 PROCEDURE — 83735 ASSAY OF MAGNESIUM: CPT

## 2023-05-29 PROCEDURE — 6370000000 HC RX 637 (ALT 250 FOR IP): Performed by: NURSE PRACTITIONER

## 2023-05-29 PROCEDURE — 6370000000 HC RX 637 (ALT 250 FOR IP)

## 2023-05-29 PROCEDURE — 80053 COMPREHEN METABOLIC PANEL: CPT

## 2023-05-29 PROCEDURE — 2580000003 HC RX 258

## 2023-05-29 PROCEDURE — 6370000000 HC RX 637 (ALT 250 FOR IP): Performed by: INTERNAL MEDICINE

## 2023-05-29 PROCEDURE — 36415 COLL VENOUS BLD VENIPUNCTURE: CPT

## 2023-05-29 PROCEDURE — 99233 SBSQ HOSP IP/OBS HIGH 50: CPT | Performed by: INTERNAL MEDICINE

## 2023-05-29 PROCEDURE — 83930 ASSAY OF BLOOD OSMOLALITY: CPT

## 2023-05-29 PROCEDURE — 84550 ASSAY OF BLOOD/URIC ACID: CPT

## 2023-05-29 PROCEDURE — 1200000000 HC SEMI PRIVATE

## 2023-05-29 PROCEDURE — 6360000002 HC RX W HCPCS

## 2023-05-29 RX ORDER — FAMOTIDINE 20 MG/1
20 TABLET, FILM COATED ORAL DAILY
Status: DISCONTINUED | OUTPATIENT
Start: 2023-05-29 | End: 2023-06-01 | Stop reason: HOSPADM

## 2023-05-29 RX ORDER — METOPROLOL SUCCINATE 50 MG/1
100 TABLET, EXTENDED RELEASE ORAL DAILY
Status: DISCONTINUED | OUTPATIENT
Start: 2023-05-29 | End: 2023-05-30

## 2023-05-29 RX ORDER — LANOLIN ALCOHOL/MO/W.PET/CERES
400 CREAM (GRAM) TOPICAL DAILY
Status: DISCONTINUED | OUTPATIENT
Start: 2023-05-29 | End: 2023-06-01 | Stop reason: HOSPADM

## 2023-05-29 RX ORDER — INSULIN GLARGINE 100 [IU]/ML
27 INJECTION, SOLUTION SUBCUTANEOUS NIGHTLY
Status: DISCONTINUED | OUTPATIENT
Start: 2023-05-29 | End: 2023-06-01 | Stop reason: HOSPADM

## 2023-05-29 RX ORDER — MECOBALAMIN 5000 MCG
10 TABLET,DISINTEGRATING ORAL NIGHTLY
Status: DISCONTINUED | OUTPATIENT
Start: 2023-05-30 | End: 2023-06-01 | Stop reason: HOSPADM

## 2023-05-29 RX ADMIN — INSULIN LISPRO 1 UNITS: 100 INJECTION, SOLUTION INTRAVENOUS; SUBCUTANEOUS at 11:43

## 2023-05-29 RX ADMIN — APIXABAN 5 MG: 5 TABLET, FILM COATED ORAL at 08:06

## 2023-05-29 RX ADMIN — Medication 10 ML: at 11:49

## 2023-05-29 RX ADMIN — Medication 10 ML: at 20:09

## 2023-05-29 RX ADMIN — INSULIN GLARGINE 27 UNITS: 100 INJECTION, SOLUTION SUBCUTANEOUS at 20:08

## 2023-05-29 RX ADMIN — INSULIN LISPRO 2 UNITS: 100 INJECTION, SOLUTION INTRAVENOUS; SUBCUTANEOUS at 16:53

## 2023-05-29 RX ADMIN — METOPROLOL SUCCINATE 100 MG: 50 TABLET, EXTENDED RELEASE ORAL at 11:47

## 2023-05-29 RX ADMIN — FAMOTIDINE 20 MG: 20 TABLET, FILM COATED ORAL at 11:42

## 2023-05-29 RX ADMIN — PRAVASTATIN SODIUM 20 MG: 20 TABLET ORAL at 20:08

## 2023-05-29 RX ADMIN — MAGNESIUM SULFATE HEPTAHYDRATE 2000 MG: 40 INJECTION, SOLUTION INTRAVENOUS at 10:30

## 2023-05-29 RX ADMIN — APIXABAN 5 MG: 5 TABLET, FILM COATED ORAL at 20:08

## 2023-05-29 RX ADMIN — Medication 400 MG: at 11:47

## 2023-05-29 RX ADMIN — Medication 10 MG: at 23:46

## 2023-05-29 RX ADMIN — PANTOPRAZOLE SODIUM 20 MG: 20 TABLET, DELAYED RELEASE ORAL at 08:06

## 2023-05-29 RX ADMIN — METOPROLOL TARTRATE 50 MG: 50 TABLET, FILM COATED ORAL at 08:06

## 2023-05-30 PROBLEM — I42.8 NICM (NONISCHEMIC CARDIOMYOPATHY) (HCC): Status: ACTIVE | Noted: 2023-05-30

## 2023-05-30 LAB
ALBUMIN SERPL-MCNC: 3.7 G/DL (ref 3.4–5)
ALBUMIN/GLOB SERPL: 1.3 {RATIO} (ref 1.1–2.2)
ALP SERPL-CCNC: 57 U/L (ref 40–129)
ALT SERPL-CCNC: 20 U/L (ref 10–40)
ANION GAP SERPL CALCULATED.3IONS-SCNC: 11 MMOL/L (ref 3–16)
AST SERPL-CCNC: 31 U/L (ref 15–37)
BASOPHILS # BLD: 0.1 K/UL (ref 0–0.2)
BASOPHILS NFR BLD: 1.2 %
BILIRUB SERPL-MCNC: 0.6 MG/DL (ref 0–1)
BUN SERPL-MCNC: 18 MG/DL (ref 7–20)
CALCIUM SERPL-MCNC: 9.5 MG/DL (ref 8.3–10.6)
CHLORIDE SERPL-SCNC: 86 MMOL/L (ref 99–110)
CO2 SERPL-SCNC: 29 MMOL/L (ref 21–32)
CREAT SERPL-MCNC: 1 MG/DL (ref 0.6–1.2)
DEPRECATED RDW RBC AUTO: 16.2 % (ref 12.4–15.4)
EOSINOPHIL # BLD: 0.3 K/UL (ref 0–0.6)
EOSINOPHIL NFR BLD: 3.4 %
GFR SERPLBLD CREATININE-BSD FMLA CKD-EPI: 55 ML/MIN/{1.73_M2}
GLUCOSE BLD-MCNC: 144 MG/DL (ref 70–99)
GLUCOSE BLD-MCNC: 179 MG/DL (ref 70–99)
GLUCOSE BLD-MCNC: 207 MG/DL (ref 70–99)
GLUCOSE SERPL-MCNC: 126 MG/DL (ref 70–99)
HCT VFR BLD AUTO: 29.3 % (ref 36–48)
HGB BLD-MCNC: 9.4 G/DL (ref 12–16)
LYMPHOCYTES # BLD: 1.3 K/UL (ref 1–5.1)
LYMPHOCYTES NFR BLD: 14.2 %
MAGNESIUM SERPL-MCNC: 1.8 MG/DL (ref 1.8–2.4)
MCH RBC QN AUTO: 24.3 PG (ref 26–34)
MCHC RBC AUTO-ENTMCNC: 31.9 G/DL (ref 31–36)
MCV RBC AUTO: 76.3 FL (ref 80–100)
MONOCYTES # BLD: 0.7 K/UL (ref 0–1.3)
MONOCYTES NFR BLD: 8 %
NEUTROPHILS # BLD: 6.7 K/UL (ref 1.7–7.7)
NEUTROPHILS NFR BLD: 73.2 %
PERFORMED ON: ABNORMAL
PLATELET # BLD AUTO: 336 K/UL (ref 135–450)
PMV BLD AUTO: 7.9 FL (ref 5–10.5)
POTASSIUM SERPL-SCNC: 3.8 MMOL/L (ref 3.5–5.1)
PROT SERPL-MCNC: 6.6 G/DL (ref 6.4–8.2)
RBC # BLD AUTO: 3.84 M/UL (ref 4–5.2)
SODIUM SERPL-SCNC: 126 MMOL/L (ref 136–145)
WBC # BLD AUTO: 9.2 K/UL (ref 4–11)

## 2023-05-30 PROCEDURE — 36415 COLL VENOUS BLD VENIPUNCTURE: CPT

## 2023-05-30 PROCEDURE — 80053 COMPREHEN METABOLIC PANEL: CPT

## 2023-05-30 PROCEDURE — 6370000000 HC RX 637 (ALT 250 FOR IP): Performed by: NURSE PRACTITIONER

## 2023-05-30 PROCEDURE — 6360000002 HC RX W HCPCS

## 2023-05-30 PROCEDURE — 99233 SBSQ HOSP IP/OBS HIGH 50: CPT | Performed by: NURSE PRACTITIONER

## 2023-05-30 PROCEDURE — 1200000000 HC SEMI PRIVATE

## 2023-05-30 PROCEDURE — 6370000000 HC RX 637 (ALT 250 FOR IP)

## 2023-05-30 PROCEDURE — 85025 COMPLETE CBC W/AUTO DIFF WBC: CPT

## 2023-05-30 PROCEDURE — 2580000003 HC RX 258

## 2023-05-30 PROCEDURE — 83735 ASSAY OF MAGNESIUM: CPT

## 2023-05-30 PROCEDURE — 2700000000 HC OXYGEN THERAPY PER DAY

## 2023-05-30 PROCEDURE — 94761 N-INVAS EAR/PLS OXIMETRY MLT: CPT

## 2023-05-30 RX ORDER — METOPROLOL SUCCINATE 50 MG/1
100 TABLET, EXTENDED RELEASE ORAL 2 TIMES DAILY
Status: DISCONTINUED | OUTPATIENT
Start: 2023-05-30 | End: 2023-06-01 | Stop reason: HOSPADM

## 2023-05-30 RX ORDER — FUROSEMIDE 10 MG/ML
20 INJECTION INTRAMUSCULAR; INTRAVENOUS ONCE
Status: COMPLETED | OUTPATIENT
Start: 2023-05-30 | End: 2023-05-30

## 2023-05-30 RX ADMIN — FUROSEMIDE 20 MG: 10 INJECTION, SOLUTION INTRAMUSCULAR; INTRAVENOUS at 11:38

## 2023-05-30 RX ADMIN — ONDANSETRON 4 MG: 4 TABLET, ORALLY DISINTEGRATING ORAL at 12:29

## 2023-05-30 RX ADMIN — Medication 400 MG: at 07:32

## 2023-05-30 RX ADMIN — Medication 10 MG: at 20:18

## 2023-05-30 RX ADMIN — ONDANSETRON 4 MG: 4 TABLET, ORALLY DISINTEGRATING ORAL at 20:18

## 2023-05-30 RX ADMIN — METOPROLOL SUCCINATE 100 MG: 50 TABLET, EXTENDED RELEASE ORAL at 20:18

## 2023-05-30 RX ADMIN — METOPROLOL SUCCINATE 100 MG: 50 TABLET, EXTENDED RELEASE ORAL at 07:32

## 2023-05-30 RX ADMIN — Medication 10 ML: at 07:33

## 2023-05-30 RX ADMIN — FAMOTIDINE 20 MG: 20 TABLET, FILM COATED ORAL at 07:32

## 2023-05-30 RX ADMIN — INSULIN GLARGINE 27 UNITS: 100 INJECTION, SOLUTION SUBCUTANEOUS at 20:18

## 2023-05-30 RX ADMIN — APIXABAN 5 MG: 5 TABLET, FILM COATED ORAL at 07:32

## 2023-05-30 RX ADMIN — APIXABAN 5 MG: 5 TABLET, FILM COATED ORAL at 20:18

## 2023-05-30 RX ADMIN — Medication 10 ML: at 20:21

## 2023-05-30 RX ADMIN — PRAVASTATIN SODIUM 20 MG: 20 TABLET ORAL at 20:18

## 2023-05-30 NOTE — CARE COORDINATION
Case Management Assessment  Initial Evaluation    Date/Time of Evaluation: 5/30/2023 4:29 PM  Assessment Completed by: Matias Thomas RN    If patient is discharged prior to next notation, then this note serves as note for discharge by case management. Patient Name: Armin Shelton                   YOB: 1935  Diagnosis: Hypokalemia [E87.6]  Hypomagnesemia [E83.42]  Nausea [R11.0]  A-fib ([address] [I48.91]  Atrial fibrillation with rapid ventricular response ([address] [I48.91]                   Date / Time: 5/26/2023  9:43 AM    Patient Admission Status: Inpatient   Readmission Risk (Low < 19, Mod (19-27), High > 27): Readmission Risk Score: 12.5    Current PCP: NGUYEN Lackey CNP  PCP verified by CM? (P) Yes (now sees Pritesh Antunez)    Chart Reviewed: Yes      History Provided by: (P) Patient  Patient Orientation: (P) Alert and Oriented    Patient Cognition: (P) Alert    Hospitalization in the last 30 days (Readmission):  No    If yes, Readmission Assessment in CM Navigator will be completed.     Advance Directives:      Code Status: Full Code   Patient's Primary Decision Maker is: (P) Legal Next of Kin    Primary Decision MakeSandra Mckeon - [phone]    Secondary Decision Maker: Nguyen Champ - [phone]    Discharge Planning:    Patient lives with: (P) Alone Type of Home: (P) Apartment  Primary Care Giver: (P) Self  Patient Support Systems include: (P) Children, Friends/Neighbors   Current Financial resources: (P) Medicare  Current community resources:    Current services prior to admission: (P) Durable Medical Equipment            Current DME: (P) Walker, Other (Comment) (electric scooter)            Type of Home Care services:  (P) Nursing Services    ADLS  Prior functional level: (P) Independent in ADLs/IADLs  Current functional level: (P) Independent in ADLs/IADLs    PT AM-PAC:   /24  OT AM-PAC:   /24    Family can provide assistance at DC: (P) No  Would you like Case

## 2023-05-31 LAB
ANION GAP SERPL CALCULATED.3IONS-SCNC: 11 MMOL/L (ref 3–16)
BASOPHILS # BLD: 0.1 K/UL (ref 0–0.2)
BASOPHILS NFR BLD: 1 %
BUN SERPL-MCNC: 18 MG/DL (ref 7–20)
CALCIUM SERPL-MCNC: 9.7 MG/DL (ref 8.3–10.6)
CHLORIDE SERPL-SCNC: 85 MMOL/L (ref 99–110)
CO2 SERPL-SCNC: 32 MMOL/L (ref 21–32)
CREAT SERPL-MCNC: 1.1 MG/DL (ref 0.6–1.2)
DEPRECATED RDW RBC AUTO: 16.2 % (ref 12.4–15.4)
EOSINOPHIL # BLD: 0.3 K/UL (ref 0–0.6)
EOSINOPHIL NFR BLD: 2.8 %
GFR SERPLBLD CREATININE-BSD FMLA CKD-EPI: 49 ML/MIN/{1.73_M2}
GLUCOSE BLD-MCNC: 130 MG/DL (ref 70–99)
GLUCOSE BLD-MCNC: 147 MG/DL (ref 70–99)
GLUCOSE BLD-MCNC: 225 MG/DL (ref 70–99)
GLUCOSE BLD-MCNC: 375 MG/DL (ref 70–99)
GLUCOSE SERPL-MCNC: 136 MG/DL (ref 70–99)
HCT VFR BLD AUTO: 30.7 % (ref 36–48)
HGB BLD-MCNC: 9.8 G/DL (ref 12–16)
LYMPHOCYTES # BLD: 1 K/UL (ref 1–5.1)
LYMPHOCYTES NFR BLD: 10.8 %
MAGNESIUM SERPL-MCNC: 1.5 MG/DL (ref 1.8–2.4)
MCH RBC QN AUTO: 24.6 PG (ref 26–34)
MCHC RBC AUTO-ENTMCNC: 32 G/DL (ref 31–36)
MCV RBC AUTO: 77 FL (ref 80–100)
MONOCYTES # BLD: 0.8 K/UL (ref 0–1.3)
MONOCYTES NFR BLD: 8.6 %
NEUTROPHILS # BLD: 7.3 K/UL (ref 1.7–7.7)
NEUTROPHILS NFR BLD: 76.8 %
NT-PROBNP SERPL-MCNC: 3619 PG/ML (ref 0–449)
PERFORMED ON: ABNORMAL
PLATELET # BLD AUTO: 324 K/UL (ref 135–450)
PMV BLD AUTO: 8.1 FL (ref 5–10.5)
POTASSIUM SERPL-SCNC: 3.7 MMOL/L (ref 3.5–5.1)
RBC # BLD AUTO: 3.99 M/UL (ref 4–5.2)
SODIUM SERPL-SCNC: 128 MMOL/L (ref 136–145)
WBC # BLD AUTO: 9.5 K/UL (ref 4–11)

## 2023-05-31 PROCEDURE — 6360000002 HC RX W HCPCS

## 2023-05-31 PROCEDURE — 6370000000 HC RX 637 (ALT 250 FOR IP): Performed by: NURSE PRACTITIONER

## 2023-05-31 PROCEDURE — 80048 BASIC METABOLIC PNL TOTAL CA: CPT

## 2023-05-31 PROCEDURE — 83880 ASSAY OF NATRIURETIC PEPTIDE: CPT

## 2023-05-31 PROCEDURE — 6370000000 HC RX 637 (ALT 250 FOR IP): Performed by: FAMILY MEDICINE

## 2023-05-31 PROCEDURE — 85025 COMPLETE CBC W/AUTO DIFF WBC: CPT

## 2023-05-31 PROCEDURE — 6370000000 HC RX 637 (ALT 250 FOR IP)

## 2023-05-31 PROCEDURE — 1200000000 HC SEMI PRIVATE

## 2023-05-31 PROCEDURE — 97166 OT EVAL MOD COMPLEX 45 MIN: CPT

## 2023-05-31 PROCEDURE — 97535 SELF CARE MNGMENT TRAINING: CPT

## 2023-05-31 PROCEDURE — 36415 COLL VENOUS BLD VENIPUNCTURE: CPT

## 2023-05-31 PROCEDURE — 97530 THERAPEUTIC ACTIVITIES: CPT

## 2023-05-31 PROCEDURE — 2580000003 HC RX 258

## 2023-05-31 PROCEDURE — 99233 SBSQ HOSP IP/OBS HIGH 50: CPT | Performed by: NURSE PRACTITIONER

## 2023-05-31 PROCEDURE — 6360000002 HC RX W HCPCS: Performed by: FAMILY MEDICINE

## 2023-05-31 PROCEDURE — 97161 PT EVAL LOW COMPLEX 20 MIN: CPT

## 2023-05-31 PROCEDURE — 97116 GAIT TRAINING THERAPY: CPT

## 2023-05-31 PROCEDURE — 6360000002 HC RX W HCPCS: Performed by: NURSE PRACTITIONER

## 2023-05-31 PROCEDURE — 83735 ASSAY OF MAGNESIUM: CPT

## 2023-05-31 RX ORDER — FUROSEMIDE 10 MG/ML
40 INJECTION INTRAMUSCULAR; INTRAVENOUS 2 TIMES DAILY
Status: DISCONTINUED | OUTPATIENT
Start: 2023-05-31 | End: 2023-05-31

## 2023-05-31 RX ORDER — FUROSEMIDE 20 MG/1
20 TABLET ORAL 2 TIMES DAILY
Status: DISCONTINUED | OUTPATIENT
Start: 2023-05-31 | End: 2023-05-31

## 2023-05-31 RX ORDER — FUROSEMIDE 10 MG/ML
40 INJECTION INTRAMUSCULAR; INTRAVENOUS DAILY
Status: DISCONTINUED | OUTPATIENT
Start: 2023-06-01 | End: 2023-06-01

## 2023-05-31 RX ORDER — FUROSEMIDE 10 MG/ML
20 INJECTION INTRAMUSCULAR; INTRAVENOUS ONCE
Status: COMPLETED | OUTPATIENT
Start: 2023-05-31 | End: 2023-05-31

## 2023-05-31 RX ORDER — INSULIN LISPRO 100 [IU]/ML
5 INJECTION, SOLUTION INTRAVENOUS; SUBCUTANEOUS
Status: DISCONTINUED | OUTPATIENT
Start: 2023-05-31 | End: 2023-06-01 | Stop reason: HOSPADM

## 2023-05-31 RX ORDER — MAGNESIUM SULFATE IN WATER 40 MG/ML
2000 INJECTION, SOLUTION INTRAVENOUS ONCE
Status: COMPLETED | OUTPATIENT
Start: 2023-05-31 | End: 2023-05-31

## 2023-05-31 RX ORDER — DIGOXIN 125 MCG
62.5 TABLET ORAL DAILY
Status: DISCONTINUED | OUTPATIENT
Start: 2023-05-31 | End: 2023-06-01 | Stop reason: HOSPADM

## 2023-05-31 RX ORDER — FUROSEMIDE 10 MG/ML
40 INJECTION INTRAMUSCULAR; INTRAVENOUS DAILY
Status: DISCONTINUED | OUTPATIENT
Start: 2023-05-31 | End: 2023-05-31

## 2023-05-31 RX ORDER — DIGOXIN 125 MCG
125 TABLET ORAL DAILY
Status: DISCONTINUED | OUTPATIENT
Start: 2023-05-31 | End: 2023-05-31

## 2023-05-31 RX ADMIN — ONDANSETRON 4 MG: 4 TABLET, ORALLY DISINTEGRATING ORAL at 08:30

## 2023-05-31 RX ADMIN — Medication 10 ML: at 07:52

## 2023-05-31 RX ADMIN — Medication 10 MG: at 21:12

## 2023-05-31 RX ADMIN — Medication 400 MG: at 07:53

## 2023-05-31 RX ADMIN — PRAVASTATIN SODIUM 20 MG: 20 TABLET ORAL at 21:12

## 2023-05-31 RX ADMIN — FAMOTIDINE 20 MG: 20 TABLET, FILM COATED ORAL at 07:53

## 2023-05-31 RX ADMIN — FUROSEMIDE 20 MG: 10 INJECTION, SOLUTION INTRAMUSCULAR; INTRAVENOUS at 15:15

## 2023-05-31 RX ADMIN — INSULIN LISPRO 4 UNITS: 100 INJECTION, SOLUTION INTRAVENOUS; SUBCUTANEOUS at 11:30

## 2023-05-31 RX ADMIN — METOPROLOL SUCCINATE 100 MG: 50 TABLET, EXTENDED RELEASE ORAL at 21:11

## 2023-05-31 RX ADMIN — APIXABAN 5 MG: 5 TABLET, FILM COATED ORAL at 07:53

## 2023-05-31 RX ADMIN — METOPROLOL SUCCINATE 100 MG: 50 TABLET, EXTENDED RELEASE ORAL at 07:53

## 2023-05-31 RX ADMIN — APIXABAN 5 MG: 5 TABLET, FILM COATED ORAL at 21:11

## 2023-05-31 RX ADMIN — Medication 10 ML: at 21:12

## 2023-05-31 RX ADMIN — DIGOXIN 62.5 MCG: 125 TABLET ORAL at 15:15

## 2023-05-31 RX ADMIN — INSULIN LISPRO 5 UNITS: 100 INJECTION, SOLUTION INTRAVENOUS; SUBCUTANEOUS at 17:39

## 2023-05-31 RX ADMIN — MAGNESIUM SULFATE HEPTAHYDRATE 2000 MG: 40 INJECTION, SOLUTION INTRAVENOUS at 09:32

## 2023-05-31 RX ADMIN — FUROSEMIDE 20 MG: 10 INJECTION, SOLUTION INTRAMUSCULAR; INTRAVENOUS at 09:01

## 2023-05-31 RX ADMIN — INSULIN GLARGINE 27 UNITS: 100 INJECTION, SOLUTION SUBCUTANEOUS at 21:12

## 2023-05-31 NOTE — PLAN OF CARE
Problem: Discharge Planning  Goal: Discharge to home or other facility with appropriate resources  Outcome: Progressing  Flowsheets (Taken 5/30/2023 1932)  Discharge to home or other facility with appropriate resources:   Identify barriers to discharge with patient and caregiver   Arrange for needed discharge resources and transportation as appropriate   Identify discharge learning needs (meds, wound care, etc)     Problem: Safety - Adult  Goal: Free from fall injury  Outcome: Progressing     Problem: ABCDS Injury Assessment  Goal: Absence of physical injury  Outcome: Progressing     Problem: Chronic Conditions and Co-morbidities  Goal: Patient's chronic conditions and co-morbidity symptoms are monitored and maintained or improved  Outcome: Progressing  Flowsheets (Taken 5/30/2023 1932)  Care Plan - Patient's Chronic Conditions and Co-Morbidity Symptoms are Monitored and Maintained or Improved:   Monitor and assess patient's chronic conditions and comorbid symptoms for stability, deterioration, or improvement   Collaborate with multidisciplinary team to address chronic and comorbid conditions and prevent exacerbation or deterioration

## 2023-05-31 NOTE — DISCHARGE INSTR - COC
Continuity of Care Form    Patient Name: Devi Tang   :  1935  MRN:  [id number]    Admit date:  2023  Discharge date:  2023    Code Status Order: Full Code   Advance Directives:     Admitting Physician:  Boris Robledo MD  PCP: NGUYEN Sanon CNP    Discharging Nurse: [facility] Unit/Room#: 9093/5774-80  Discharging Unit Phone Number: [phone]    Emergency Contact:   Extended Emergency Contact Information  Primary Emergency Contact: [address] Phone: [phone]  Mobile Phone: [phone]  Relation: Other  Secondary Emergency Contact: [address] Phone: [phone]  Relation: None    Past Surgical History:  Past Surgical History:   Procedure Laterality Date    BREAST LUMPECTOMY      right    COLONOSCOPY  2015    HYSTERECTOMY (CERVIX STATUS UNKNOWN)      LITHOTRIPSY      NERVE BLOCK Right 9/15/2022    RIGHT SACROILIAC JOINT INJECTION SITE CONFIRMED BY FLUOROSCOPY performed by Javier Cheung MD at [place] [address] Right 2022    RIGHT LUMBAR FOUR FIVE EPIDURAL STEROID INJECTION SITE CONFIRMED BY FLUOROSCOPY performed by Javier Cheung MD at [place] [address] Right 2022    RIGHT LUMBAR FOUR FIVE EPIDURAL STEROID INJECTION SITE CONFIRMED BY FLUOROSCOPY performed by Javier Cheung MD at [place] [address] Right 2022    RIGHT LUMBAR THREE LUMBAR FOUR LUMBAR FIVE MEDIAL BRANCH BLOCK SITE CONFIRMED BY FLUOROSCOPY performed by Javier Cheung MD at [facility]    RECTAL SURGERY         Immunization History:   Immunization History   Administered Date(s) Administered    COVID-19, J&J, (age 18y+), IM, 0.5 mL 2021    COVID-19, PFIZER Bivalent, DO NOT Dilute, (age 12y+), IM, 30 mcg/0.3 mL 10/26/2022       Active Problems:  Patient Active Problem List   Diagnosis Code    Lumbar radiculopathy M54.16    Lumbar spondylosis M47.816

## 2023-05-31 NOTE — CARE COORDINATION
[facility]    [facility] unable to accept    Patient has no preference in agency    [facility] by Celina Ballesteros RN, BSN CTN  [facility] [phone]

## 2023-06-01 VITALS
TEMPERATURE: 97.6 F | WEIGHT: 243.7 LBS | HEART RATE: 101 BPM | HEIGHT: 61 IN | RESPIRATION RATE: 16 BRPM | DIASTOLIC BLOOD PRESSURE: 87 MMHG | BODY MASS INDEX: 46.01 KG/M2 | OXYGEN SATURATION: 95 % | SYSTOLIC BLOOD PRESSURE: 128 MMHG

## 2023-06-01 LAB
ANION GAP SERPL CALCULATED.3IONS-SCNC: 10 MMOL/L (ref 3–16)
BUN SERPL-MCNC: 22 MG/DL (ref 7–20)
CALCIUM SERPL-MCNC: 9.2 MG/DL (ref 8.3–10.6)
CHLORIDE SERPL-SCNC: 83 MMOL/L (ref 99–110)
CO2 SERPL-SCNC: 32 MMOL/L (ref 21–32)
CREAT SERPL-MCNC: 1.1 MG/DL (ref 0.6–1.2)
GFR SERPLBLD CREATININE-BSD FMLA CKD-EPI: 49 ML/MIN/{1.73_M2}
GLUCOSE BLD-MCNC: 189 MG/DL (ref 70–99)
GLUCOSE BLD-MCNC: 246 MG/DL (ref 70–99)
GLUCOSE SERPL-MCNC: 156 MG/DL (ref 70–99)
MAGNESIUM SERPL-MCNC: 1.6 MG/DL (ref 1.8–2.4)
PERFORMED ON: ABNORMAL
PERFORMED ON: ABNORMAL
POTASSIUM SERPL-SCNC: 3.5 MMOL/L (ref 3.5–5.1)
SODIUM SERPL-SCNC: 125 MMOL/L (ref 136–145)

## 2023-06-01 PROCEDURE — 2580000003 HC RX 258

## 2023-06-01 PROCEDURE — 6360000002 HC RX W HCPCS

## 2023-06-01 PROCEDURE — 6370000000 HC RX 637 (ALT 250 FOR IP)

## 2023-06-01 PROCEDURE — 97116 GAIT TRAINING THERAPY: CPT

## 2023-06-01 PROCEDURE — 6370000000 HC RX 637 (ALT 250 FOR IP): Performed by: FAMILY MEDICINE

## 2023-06-01 PROCEDURE — 80048 BASIC METABOLIC PNL TOTAL CA: CPT

## 2023-06-01 PROCEDURE — 6370000000 HC RX 637 (ALT 250 FOR IP): Performed by: NURSE PRACTITIONER

## 2023-06-01 PROCEDURE — 83735 ASSAY OF MAGNESIUM: CPT

## 2023-06-01 PROCEDURE — 97530 THERAPEUTIC ACTIVITIES: CPT

## 2023-06-01 PROCEDURE — 36415 COLL VENOUS BLD VENIPUNCTURE: CPT

## 2023-06-01 PROCEDURE — 6370000000 HC RX 637 (ALT 250 FOR IP): Performed by: STUDENT IN AN ORGANIZED HEALTH CARE EDUCATION/TRAINING PROGRAM

## 2023-06-01 RX ORDER — FUROSEMIDE 20 MG/1
20 TABLET ORAL DAILY
Qty: 60 TABLET | Refills: 0 | Status: SHIPPED | OUTPATIENT
Start: 2023-06-01

## 2023-06-01 RX ORDER — FUROSEMIDE 40 MG/1
40 TABLET ORAL 2 TIMES DAILY
Status: DISCONTINUED | OUTPATIENT
Start: 2023-06-01 | End: 2023-06-01 | Stop reason: HOSPADM

## 2023-06-01 RX ORDER — INSULIN GLARGINE 100 [IU]/ML
27 INJECTION, SOLUTION SUBCUTANEOUS NIGHTLY
Qty: 15 ML | Refills: 1 | Status: SHIPPED | OUTPATIENT
Start: 2023-06-01

## 2023-06-01 RX ORDER — DIGOXIN 125 MCG
62.5 TABLET ORAL DAILY
Qty: 30 TABLET | Refills: 1 | Status: SHIPPED | OUTPATIENT
Start: 2023-06-01

## 2023-06-01 RX ORDER — FUROSEMIDE 40 MG/1
40 TABLET ORAL ONCE
Status: COMPLETED | OUTPATIENT
Start: 2023-06-01 | End: 2023-06-01

## 2023-06-01 RX ORDER — MAGNESIUM SULFATE IN WATER 40 MG/ML
2000 INJECTION, SOLUTION INTRAVENOUS ONCE
Status: COMPLETED | OUTPATIENT
Start: 2023-06-01 | End: 2023-06-01

## 2023-06-01 RX ORDER — DIGOXIN 0.06 MG/1
62.5 TABLET ORAL DAILY
Qty: 30 TABLET | Refills: 3 | Status: SHIPPED
Start: 2023-06-02 | End: 2023-06-01 | Stop reason: HOSPADM

## 2023-06-01 RX ORDER — METOPROLOL SUCCINATE 100 MG/1
100 TABLET, EXTENDED RELEASE ORAL 2 TIMES DAILY
Qty: 30 TABLET | Refills: 3 | Status: SHIPPED | OUTPATIENT
Start: 2023-06-01

## 2023-06-01 RX ORDER — LANOLIN ALCOHOL/MO/W.PET/CERES
400 CREAM (GRAM) TOPICAL DAILY
Qty: 30 TABLET | Refills: 0 | Status: SHIPPED | OUTPATIENT
Start: 2023-06-02

## 2023-06-01 RX ORDER — POTASSIUM CHLORIDE 20 MEQ/1
20 TABLET, EXTENDED RELEASE ORAL DAILY
Qty: 60 TABLET | Refills: 0 | Status: SHIPPED | OUTPATIENT
Start: 2023-06-01

## 2023-06-01 RX ORDER — FAMOTIDINE 20 MG/1
20 TABLET, FILM COATED ORAL DAILY
Qty: 60 TABLET | Refills: 3 | Status: SHIPPED | OUTPATIENT
Start: 2023-06-02

## 2023-06-01 RX ADMIN — FUROSEMIDE 40 MG: 40 TABLET ORAL at 10:39

## 2023-06-01 RX ADMIN — SODIUM CHLORIDE 10 ML: 9 INJECTION, SOLUTION INTRAVENOUS at 10:43

## 2023-06-01 RX ADMIN — FAMOTIDINE 20 MG: 20 TABLET, FILM COATED ORAL at 09:23

## 2023-06-01 RX ADMIN — INSULIN LISPRO 1 UNITS: 100 INJECTION, SOLUTION INTRAVENOUS; SUBCUTANEOUS at 13:06

## 2023-06-01 RX ADMIN — INSULIN LISPRO 5 UNITS: 100 INJECTION, SOLUTION INTRAVENOUS; SUBCUTANEOUS at 09:20

## 2023-06-01 RX ADMIN — Medication 400 MG: at 09:23

## 2023-06-01 RX ADMIN — MAGNESIUM SULFATE HEPTAHYDRATE 2000 MG: 40 INJECTION, SOLUTION INTRAVENOUS at 10:44

## 2023-06-01 RX ADMIN — DIGOXIN 62.5 MCG: 125 TABLET ORAL at 09:23

## 2023-06-01 RX ADMIN — APIXABAN 5 MG: 5 TABLET, FILM COATED ORAL at 09:23

## 2023-06-01 RX ADMIN — INSULIN LISPRO 5 UNITS: 100 INJECTION, SOLUTION INTRAVENOUS; SUBCUTANEOUS at 13:05

## 2023-06-01 RX ADMIN — METOPROLOL SUCCINATE 100 MG: 50 TABLET, EXTENDED RELEASE ORAL at 09:23

## 2023-06-01 ASSESSMENT — PAIN SCALES - GENERAL: PAINLEVEL_OUTOF10: 2

## 2023-06-01 NOTE — PROGRESS NOTES
[address] [facility]   Cardiovascular Evaluation    PATIENT: Tabby Eisenberg  DATE: 2023  MRN: [id number]  CSN: [id number]  : 1935    Primary Care Doctor/Referring provider: NGUYEN Sharma - MISSAEL, Kristin Guevara MD     Reason for evaluation/Chief complaint:   Nausea and Dizziness (Since 8 pm last night, recent dx of afib put on elquis . per squad irregular hr as high as 190's )    Subjective: Atrial fibrillation better controlled this morning. She does have ongoing nausea vomiting. She wants to go home    History of present illness on initial date of evaluation:   Barbara Velazquez is a 82 y.o. patient who presents for the above complaints of the past several days of the above complaints. She was found to have AFIB with RVR. Patient was started on IV diltiazem and subsequently admitted to the hospital.  This morning the patient's heart rates have improved. Patient states he no longer feels palpitations but continues to have some evidence of nausea and dizziness. Patient Active Problem List   Diagnosis    Lumbar radiculopathy    Lumbar spondylosis    Sacroiliitis, not elsewhere classified (HCC)    Atrial fibrillation with rapid ventricular response (HCC)    Hypokalemia    Hypomagnesemia    Nausea         Cardiac Testing: I have reviewed the findings below. EKG:  ECHO:   Summary   Left ventricular systolic function is moderately reduced with a 3D   calculated EF of 40%. The left ventricle is mildly dilated in size with mild concentric   hypertrophy. Global hypokinesis without obvious regional variation. Elevated left atrial pressures with a septal E/e' ratio of 17.1. The right ventricle is normal in size with reduced systolic function. The left atrium is severely enlarged. The right atrium is mildly enlarged. Systolic pulmonary artery pressure (SPAP) is normal and estimated at 30 mmHg   (right atrial pressure 3 mmHg).       Signature
[address]     Cardiology                                     Progress Note    Admission date:  2023    Reason for follow up visit: AF    HPI/CC: Armin Shelton was admitted on 2023 with nausea and dizziness. EKG showed rapid AF. Atrial fibrillation was just recently diagnosed approximately two weeks ago. She has been followed by Dr. Prasad Matson. Echo showed an EF of 40%. Metoprolol was changed from tartrate to succinate and dose was increased. Has also been treated for CHF and electrolyte abnormalities. Rhythm has been AF with rates in the 70-80's. Subjective: She is feeling well. She is off of oxygen and hopeful for discharge. Denies chest pain, palpitations, shortness of breath, and dizziness. Vitals:  Blood pressure 111/71, pulse 91, temperature 97.4 °F (36.3 °C), temperature source Axillary, resp. rate 16, height 5' 1\" (1.549 m), weight 243 lb 11.2 oz (110.5 kg), SpO2 92 %.   Temp  Av.6 °F (36.4 °C)  Min: 97.4 °F (36.3 °C)  Max: 97.9 °F (36.6 °C)  Pulse  Av.4  Min: 82  Max: 101  BP  Min: 107/74  Max: 132/88  SpO2  Av %  Min: 90 %  Max: 92 %    24 hour I/O    Intake/Output Summary (Last 24 hours) at 2023 1247  Last data filed at 2023 1234  Gross per 24 hour   Intake 1680 ml   Output 3900 ml   Net -2220 ml       Current Facility-Administered Medications   Medication Dose Route Frequency Provider Last Rate Last Admin    furosemide (LASIX) tablet 40 mg  40 mg Oral BID Pawan Nichols MD        insulin lispro (HUMALOG) injection vial 5 Units  5 Units SubCUTAneous TID  Xavier Snider MD   5 Units at 23 09    digoxin (LANOXIN) tablet 62.5 mcg  62.5 mcg Oral Daily NGUYEN Tenorio - CNP   62.5 mcg at 23 09    metoprolol succinate (TOPROL XL) extended release tablet 100 mg  100 mg Oral BID NGUYEN Tenorio - CNP   100 mg at 23 09    insulin glargine (LANTUS) injection vial 27 Units  27 Units SubCUTAneous Nightly [place]
[address]     Cardiology                                     Progress Note    Admission date:  2023    Reason for follow up visit: AF    HPI/CC: Devi Tang was admitted on 2023 with nausea and dizziness. EKG showed rapid AF. Atrial fibrillation was just recently diagnosed approximately two weeks ago. She has been followed by Dr. Jael Soto. Echo showed an EF of 40%. Metoprolol was changed from tartrate to succinate and dose was increased. Has also been treated for CHF and electrolyte abnormalities. Rhythm has been AF with rates in the 90's to low 100's. Subjective: She complains of ongoing shortness of breath. She has been unaware of abnormal heart rhythm. Vitals:  Blood pressure 132/88, pulse 92, temperature 98.5 °F (36.9 °C), temperature source Oral, resp. rate 18, height 5' 1\" (1.549 m), weight 243 lb 9.6 oz (110.5 kg), SpO2 90 %.   Temp  Av.9 °F (36.6 °C)  Min: 97.6 °F (36.4 °C)  Max: 98.5 °F (36.9 °C)  Pulse  Av.1  Min: 83  Max: 100  BP  Min: 103/71  Max: 145/82  SpO2  Av.3 %  Min: 88 %  Max: 95 %    24 hour I/O    Intake/Output Summary (Last 24 hours) at 2023 1454  Last data filed at 2023 1323  Gross per 24 hour   Intake 1560 ml   Output 3575 ml   Net -2015 ml       Current Facility-Administered Medications   Medication Dose Route Frequency Provider Last Rate Last Admin    insulin lispro (HUMALOG) injection vial 5 Units  5 Units SubCUTAneous TID  Kalpana De MD        digoxin (LANOXIN) tablet 125 mcg  125 mcg Oral Daily NGUYEN Tenorio CNP        furosemide (LASIX) injection 40 mg  40 mg IntraVENous Daily NGUYEN Tenorio CNP        metoprolol succinate (TOPROL XL) extended release tablet 100 mg  100 mg Oral BID NGUYEN Tenorio CNP   100 mg at 23 0753    insulin glargine (LANTUS) injection vial 27 Units  27 Units SubCUTAneous Nightly [address]   27 Units at 23    famotidine (PEPCID) tablet 20 mg
Occupational Therapy  Facility/Department: [facility] C4 PCU  Occupational Therapy Initial Assessment    Name: Armin Shelton  : 1935  MRN: [id number]  Date of Service: 2023    Discharge Recommendations:  Home with assist PRN  OT Equipment Recommendations  Equipment Needed: No       Patient Diagnosis(es): The primary encounter diagnosis was Atrial fibrillation with rapid ventricular response ([address] Diagnoses of Nausea, Hypokalemia, and Hypomagnesemia were also pertinent to this visit. Past Medical History:  has a past medical history of Breast cancer ([facility] Cancer ([facility] [facility] Diabetes mellitus ([facility] GERD (gastroesophageal reflux disease), History of therapeutic radiation, Hyperlipidemia, Hypertension, Influenza A, and Kidney stone. Past Surgical History:  has a past surgical history that includes Breast lumpectomy; Hysterectomy; Lithotripsy; Rectal surgery; Colonoscopy (2015); Pain management procedure (Right, 2022); Pain management procedure (Right, 2022); Pain management procedure (Right, 2022); and Nerve Block (Right, 9/15/2022). Assessment   Assessment: Pt 83 yo female admitted with SOB and recent afib diagnosis. PTA pt living alone in independent living with level entry. Pt IPTA with ADLs, functional mobility, and IADLs (except cleaning and laundry). Currently, pt grossly SBA- SPV for functional transfers/ambulation and SPV- mod I for ADLs. Pt is currently functioning near baseline and has no acute OT needs at this time. Pt educated on and demo'ed ETHANE ex to inc activity tolerance. Recommending pt discharge home with assist PRN.    Prognosis: Good  Decision Making: Medium Complexity  REQUIRES OT FOLLOW-UP: No  Activity Tolerance  Activity Tolerance: Patient Tolerated treatment well        Plan   Occupational Therapy Plan  Times Per Week: eval and discharge     Restrictions  Restrictions/Precautions  Restrictions/Precautions: General Precautions, Up as
Orders to discharge patient home. Removed IV access. Patient tolerated well. Gave prescriptions for  to patient and provided written lexicomp handouts on each prescription. Reviewed AVS summary with patient including home medications, new prescriptions, diet, activity, follow up appointments and when to call the doctor. Patient verbalized understanding of all material. Awaiting transportation home.
Physical Therapy  Facility/Department: [facility] C4 PCU  Daily Treatment Note  NAME: Archie Lundberg  : 1935  MRN: [id number]    Date of Service: 2023    Discharge Recommendations:  Home with assist PRN   PT Equipment Recommendations  Equipment Needed: No  Other: Has rollator at home. Patient Diagnosis(es): The primary encounter diagnosis was Atrial fibrillation with rapid ventricular response ([address] Diagnoses of Nausea, Hypokalemia, and Hypomagnesemia were also pertinent to this visit. Assessment   Assessment: Pt tolerated tx well with progression of amb distance. Pt able to amb 150 ft with RW and CGA. Therapy limited by fatigue at this date. Pt reported that she was \"wiped out\" at the conclusion of therapy. Pt would benefit from continued skilled PT to address current deficits and work toward achieving therapy goals. Recommend pt return home upon d/c with assist PRN. Activity Tolerance: Patient limited by fatigue;Patient tolerated treatment well  Equipment Needed: No  Other: Has rollator at home. Plan    Physcial Therapy Plan  General Plan: 3-5 times per week  Current Treatment Recommendations: Strengthening;ROM;Balance training;Gait training;Home exercise program;Safety education & training; Therapeutic activities; Functional mobility training;Transfer training;Patient/Caregiver education & training; Endurance training;Positioning     Restrictions  Restrictions/Precautions  Restrictions/Precautions: General Precautions, Up as Tolerated  Required Braces or Orthoses?: No     Subjective    Subjective  Subjective: Pt agreeable PT. Pain: Pt reports R ankle pain that is present all the time PTA. Pt reports that she is more tired than usual because she didn't get much sleep last night.   Orientation  Overall Orientation Status: Within Normal Limits     Objective   Vitals  Pulse: 91  Heart Rate Source: Monitor  BP: 111/71  BP Location: Left upper arm  BP Method: Automatic  Patient Position: Semi
Physician Progress Note      PATIENT:               Jaguar Wyman  [facility] #:                  [id number]  :                       1935  ADMIT DATE:       2023 9:43 AM  DISCH DATE:  RESPONDING  PROVIDER #:        Dheeraj Gonzalez MD          QUERY TEXT:    Patient admitted with afib. Documentation reflects Acute systolic heart   failure. If possible, please document in the progress notes and discharge   summary if Acute systolic heart failure was: The medical record reflects the following:  Risk Factors: Afib, hyponatremia, HTN  Clinical Indicators: Per progress note  \"Acute systolic heart failure\". Per progress note  \"SOB, elevated BNP, ankle edema on admission. Echo   moderately reduced with a 3D  calculated EF of 40%\". Treatment: IV lasix, I&O's, serial labs, daily weights, supportive care,   serial labs, ECHO    Thank you,  Belle Quinteros RN BSN  Options provided:  -- Acute systolic heart failure treated and resolved  -- Acute systolic heart failure ruled out after study  -- Other - I will add my own diagnosis  -- Disagree - Not applicable / Not valid  -- Disagree - Clinically unable to determine / Unknown  -- Refer to Clinical Documentation Reviewer    PROVIDER RESPONSE TEXT:    Acute systolic HF, giving IV Lasix x 1    Query created by: Tayla Singer on 2023 3:07 PM      QUERY TEXT:    Patient admitted with atrial fibrillation and is maintained on Eliquis. If   possible, please document in progress notes and discharge summary if you are   evaluating and/or treating any of the following:     The medical record reflects the following:  Risk Factors: HTN, diabetes  Clinical Indicators: Afib with RVR POA, Continue with Eliquis BID  Treatment: Eliquis, EKG, cardiology consult, serial labs, supportive care    Thank you,  Belle Quinteros RN BSN  Options provided:  -- Secondary hypercoagulable state in a patient with atrial fibrillation  -- Other - I will add my own diagnosis  --
Quiet day feeling better heart better 80-90 up to low 100's with activity.
V2.0  [facility] [place]      Name:  Jamie Longoria /Age/Sex: 1935  (82 y.o. female)   MRN & CSN:  [id number] & [id number] Encounter Date/Time: 2023 10:26 AM EDT    Location:  [id number] PCP: Felipe Bustamante, APRN - [address] [facility] Day: 4    Assessment and Plan:   Jamie Longoria is a 82 y.o. female with a pmh of diabetes type 2, GERD, hyperlipidemia, hypertension, history of breast cancer who presents with complaints of nausea, shortness of breath and palpitations. Found to have A-fib ([address] with RVR    Plan:  Afib with RVR POA  - Normal heart rate and rhythm today. - Diagnosed 2 weeks ago  - Continue with eliquis BID  - diltizem drip -.  - Echo showed moderately reduced with a 3D   calculated EF of 40%. - cardiology consulted. - Continue oral metoprolol and eliquis. - Adjusted metoprolol to toprol 100mg xl.   - TSH within normal limits. - Tele monitoring     Acute respiratory failure currently on 2LO2 on admission. Shortness of breath POA  - Resolved. SOB, elevated BNP, ankle edema on admission.   - Currently on room air with SPO2 94%  - Echo moderately reduced with a 3D   calculated EF of 40%. - I's/O's  - Daily weights     Diabetes type 2  - Hemoglobin a1c 7.5 on 2023  - Adjusted insulin to 27 nightly. Patient takes glargin 32 units in am and 34 units at night. - Hypoglycemia protocol initiated. - Follow on blood glucose in am.   - Will continue monitoring. Hypertension  - hold home meds for now as blood pressure controlled, patient takes hydrochlorothiazide 25mg. Hyperlipidemia  - Continue statin     GERD  - discontinued PPI due to hypomagnesemia  - Started PEPCID. 8. Hyponatremia POA  -hypervolemic hyponatremia  - Most likely due to CHF exacerbation.   - Follow up on urine studies, plasma osm, uric acid. - Will continue monitoring. 9. Hypokalemia POA  - Resolved. - Potassium level 3.6 today.    - Replenished
V2.0  [facility] [place]      Name:  Vipul Palmer /Age/Sex: 1935  (82 y.o. female)   MRN & CSN:  [id number] & [id number] Encounter Date/Time: 2023 10:26 AM EDT    Location:  [id number] PCP: Vladimir Medel, APRN - [address] [facility] Day: 6    Assessment and Plan:   Vipul Palmer is a 82 y.o. female with a pmh of diabetes type 2, GERD, hyperlipidemia, hypertension, history of breast cancer who presents with complaints of nausea, shortness of breath and palpitations. Found to have A-fib ([address] with RVR    Plan:  Afib with RVR POA  - Normal heart rate and rhythm today. - Diagnosed 2 weeks ago  - Echo showed moderately reduced with a 3D   calculated EF of 40%. - TSH within normal limits. - diltizem drip -.  - cardiology consulted. - Continue with eliquis BID  - adjusted metoprolol toprol 100mg xl to BID.   - EP consulted. - Tele monitoring     Acute respiratory failure with hypoxia on admission  Acute systolic congestive heart failure Newly diagnosed. - patient currently on 1LO2.   - Currently on room air with SPO2 94%  - Echo moderately reduced with a 3D calculated EF of 40%. - Ordered dose of lasix 20mg IV. Ordered a second dose. - Started lasix 20mg BID.   - I's/O's  - Daily weights  - fluid restrictions. Diabetes type 2  - Hemoglobin a1c 7.5 on 2023  - Continue insulin glargine 27 units nightly. Patient takes glargin 32 units in am and 34 units at night.   - SSI  - Hypoglycemia protocol initiated. - Will continue monitoring. Hypertension  - hold home meds for now as blood pressure controlled, patient takes hydrochlorothiazide 25mg. Hyperlipidemia  - Continue statin     GERD  - discontinued PPI due to hypomagnesemia  - Continue PEPCID. 8. Hyponatremia POA  -hypervolemic hyponatremia  - Most likely due to CHF exacerbation.   - Follow up on urine studies, plasma osm, uric acid. - Will continue monitoring.       9.
skilled PT services to address current deficits. It is anticiapted pt will be safe to DC home with assist PRN when deemed medically approriate. Seen as co-eval with OT in order to safely assess highest level of function and to optimize functional outcomes.      Therapy Prognosis: Good  Decision Making: Low Complexity  Barriers to Learning: None  Requires PT Follow-Up: Yes  Activity Tolerance  Activity Tolerance: Patient tolerated treatment well;Patient tolerated evaluation without incident     Plan   Physcial Therapy Plan  General Plan: 3-5 times per week  Current Treatment Recommendations: Strengthening, ROM, Balance training, Gait training, Home exercise program, Safety education & training, Therapeutic activities, Functional mobility training, Transfer training, Patient/Caregiver education & training, Endurance training, Positioning  Safety Devices  Type of Devices: Patient at risk for falls, Gait belt, All fall risk precautions in place (Left with OT in bathroom.)  Restraints  Restraints Initially in Place: No     Restrictions  Restrictions/Precautions  Restrictions/Precautions: General Precautions, Up as Tolerated  Required Braces or Orthoses?: No     Subjective   Pain: Denies pain  General  Chart Reviewed: Yes  Patient assessed for rehabilitation services?: Yes  Response To Previous Treatment: Not applicable  Family / Caregiver Present: No  Referring Practitioner: Warren Marina DO  Referral Date : 05/31/23  Diagnosis: A-fib  Follows Commands: Within Functional Limits  General Comment  Comments: Pt sitting EOB upon arrival, RN cleared for therapy  Subjective  Subjective: pt pleasant and agreeable to therapy         Social/Functional History  Social/Functional History  Lives With: Alone  Type of Home: Senior housing apartment (Independent living)  Home Layout: One level  Home Access: Level entry  Bathroom Shower/Tub: Walk-in shower  Bathroom Toilet: Handicap height  Bathroom Equipment: Grab bars in shower,
Ht 5' 1\" (1.549 m)   Wt 248 lb 4.8 oz (112.6 kg)   SpO2 96%   BMI 46.92 kg/m²      General appearance: No apparent distress, appears stated age and cooperative. HEENT: Pupils equal, round, and reactive to light. Conjunctivae/corneas clear. Neck: Supple, with full range of motion. No jugular venous distention. Trachea midline. Respiratory: Improved air movement in bilateral lower lobes. Normal respiratory effort. without Rales/Wheezes/Rhonchi. Cardiovascular: irregular rhythm, regular rate with normal S1/S2 without murmurs, rubs or gallops. Abdomen: Soft, non-tender, non-distended with normal bowel sounds. Musculoskeletal: trace ankle edema bilaterally. Full range of motion without deformity. Skin: Skin color, texture, turgor normal.  No rashes or lesions. Neurologic:  Neurovascularly intact without any focal sensory/motor deficits.  Cranial nerves: II-XII intact, grossly non-focal.  Psychiatric: Alert and oriented, thought content appropriate, normal insight  Capillary Refill: Brisk,3 seconds, normal   Peripheral Pulses: +2 palpable, equal bilaterally       Medications:   Medications:    insulin glargine  27 Units SubCUTAneous Nightly    famotidine  20 mg Oral Daily    magnesium oxide  400 mg Oral Daily    metoprolol succinate  100 mg Oral Daily    melatonin  10 mg Oral Nightly    insulin lispro  0-4 Units SubCUTAneous TID WC    insulin lispro  0-4 Units SubCUTAneous Nightly    sodium chloride flush  5-40 mL IntraVENous 2 times per day    apixaban  5 mg Oral BID    pravastatin  20 mg Oral Nightly      Infusions:    sodium chloride      dextrose       PRN Meds: oxyCODONE-acetaminophen, 1 tablet, Q6H PRN  magnesium sulfate, 2,000 mg, PRN  potassium chloride, 40 mEq, PRN   Or  potassium alternative oral replacement, 40 mEq, PRN   Or  potassium chloride, 10 mEq, PRN  sodium chloride flush, 5-40 mL, PRN  sodium chloride, , PRN  ondansetron, 4 mg, Q8H PRN   Or  ondansetron, 4 mg, Q6H PRN  polyethylene glycol, 17
mmHg).       Echo 2021:  Summary:   Mild aortic root dilatation. The left ventricle is mildly dilated. There is moderate concentric left ventricular hypertrophy. Mild tricuspid regurgitation is present. Moderate (1-2 cm) pericardial effusion. Overall left ventricular ejection fraction is estimated to be 65-70%. Left ventricular systolic function is normal. (LVEF>/=55%)   The left ventricular wall motion is normal.   Left atrium is moderately (34-39 ml/m2) dilated. The pulmonary artery is not well visualized. Cath 5/2021 ([facility]):  Findings:     Left Main  Normal     RCA  Dominant and normal     LAD  Normal     LCX  Normal             LV  EF of 60 % with normal LV systolic function. There is no significant   MR. There is no significant LV-Ao gradient. LVEDP of 27 mmHg. RA  Mean of 12 with a = 16 and v = 14 mmHg   RV  59/8 with EDP of 17 mmHg   PA  60/24 with mean of 40 mmHg   PVR:  149 Dynes·sec·cm? ?   TPVR:  397 Dynes·sec·cm? ?     PCW  Mean of 25 with a = 30 and v = 32 mmHg   Cardiac Output  8.07 L/min with an index of 3.9 L/m/M²     Impression:   EF 60%       Normal coronaries       Elevated filling and right sided pressures      All labs and testing reviewed.   Lab Review     Renal Profile:   Lab Results   Component Value Date/Time    CREATININE 1.0 05/30/2023 04:37 AM    BUN 18 05/30/2023 04:37 AM     05/30/2023 04:37 AM    K 3.8 05/30/2023 04:37 AM    CL 86 05/30/2023 04:37 AM    CO2 29 05/30/2023 04:37 AM     CBC:    Lab Results   Component Value Date/Time    WBC 9.2 05/30/2023 04:37 AM    RBC 3.84 05/30/2023 04:37 AM    HGB 9.4 05/30/2023 04:37 AM    HCT 29.3 05/30/2023 04:37 AM    MCV 76.3 05/30/2023 04:37 AM    RDW 16.2 05/30/2023 04:37 AM     05/30/2023 04:37 AM     BNP:  No results found for: BNP  Fasting Lipid Panel:    Lab Results   Component Value Date/Time    CHOL 137 11/22/2022 08:20 AM    HDL 57 11/22/2022 08:20 AM    TRIG 101 11/22/2022 08:20 AM     Cardiac

## 2023-06-01 NOTE — DISCHARGE SUMMARY
V2.0  Discharge Summary    Name:  Hal Montiel /Age/Sex: 1935 (82 y.o. female)   Admit Date: 2023  Discharge Date: 23    MRN & CSN:  [id number] & [id number] Encounter Date and Time 23 2:07 PM EDT    Attending:  Odin Pedro MD Discharging Provider: Asad Scott [facility] Course:     Brief HPI:   Hal Montiel is a 82 y.o. female with a pmh of diabetes type 2, GERD, hyperlipidemia, hypertension, history of breast cancer who presents with complaints of nausea, shortness of breath and palpitations. Found to have A-fib ([address] with RVR     Brief Problem Based Course:   Afib with RVR POA  - irregular rhyth, normal heart rate today. - Echo showed moderately reduced with a 3D calculated EF of 40%. - TSH within normal limits. - diltizem drip -.  - cardiology consulted. - Continue with eliquis BID  - adjusted metoprolol toprol 100mg xl to BID. - Started digoxin 62.5 mcg PO daily.  - Given LA size, restoration of sinus rhythm is unlikely. - EP consulted. - Tele monitoring     Acute respiratory failure with hypoxia on admission  Acute systolic congestive heart failure Newly diagnosed. -  currently on room air with spo2 92%. - Echo moderately reduced with a 3D calculated EF of 40%. - received lasix 20mg IV.   - discharged home with lasix 20mg daily.   - I's/O's  - Daily weights  - fluid restrictions. - Advised patient to follow up with cardiologist after discharge. Diabetes type 2  - Hemoglobin a1c 7.5 on 2023  - Adjusted insulin to 27 units nightly. Patient was taking insulin 32units BID in outpatient setting.   - SSI  - Hypoglycemia protocol initiated. - Follow up with PCP for chronic management. Hypertension  - Continue home medications. - Advised patient to stop hydrochlorothiazide if blood pressure below 130/90.   - Follow up with PCP for chronic management.       Hyperlipidemia  - Continue statin  - Follow up with PCP for chronic

## 2023-06-01 NOTE — PLAN OF CARE
Problem: Discharge Planning  Goal: Discharge to home or other facility with appropriate resources  Outcome: Completed     Problem: Safety - Adult  Goal: Free from fall injury  Outcome: Completed     Problem: ABCDS Injury Assessment  Goal: Absence of physical injury  Outcome: Completed     Problem: Chronic Conditions and Co-morbidities  Goal: Patient's chronic conditions and co-morbidity symptoms are monitored and maintained or improved  Outcome: Completed

## 2023-06-01 NOTE — CARE COORDINATION
CASE MANAGEMENT DISCHARGE SUMMARY      Discharge to: Home with [facility]         IMM given: (date) Today    New Durable Medical Equipment ordered/agency: none    Transportation:    Family/car:on arrival       Confirmed discharge plan with:MD/[facility], RN     Patient: yes     Family:  yes  Name: Brandon Jaxon Contact number:[phone]     Facility/Agency, name:  [facility] faxed to [phone]        RN, name: Triston Malik

## 2023-06-01 NOTE — FLOWSHEET NOTE
05/31/23 2110   Vital Signs   Temp 97.4 °F (36.3 °C)   Temp Source Oral   Pulse 91   Heart Rate Source Monitor   Respirations 18   /64   MAP (Calculated) 82   MAP (mmHg) 82   BP Location Left upper arm   BP Method Automatic   Patient Position Semi fowlers   Level of Consciousness 0   MEWS Score 1   Pain Assessment   Pain Assessment None - Denies Pain   Oxygen Therapy   SpO2 91 %   O2 Device None (Room air)

## 2023-06-01 NOTE — DISCHARGE INSTRUCTIONS
If blood pressure below 130/90 do not take hydrochlorothiazde. Follow up with PCP for chronic management. Take lasix 20mg daily for now, follow up with cardiologist for chronic management. Follow up with PCP to repeat lab workup.

## 2023-06-01 NOTE — CARE COORDINATION
PT/OT recommendations for home with assist PRN. Patient wanting skilled nursing and [facility] has accepted. Per cardiology may be able to transition IV lasix to po and d/c home today. Awaiting cardiology to round today. Need HHC orders.

## 2023-07-10 RX ORDER — FUROSEMIDE 20 MG/1
20 TABLET ORAL DAILY
Qty: 60 TABLET | Refills: 0 | OUTPATIENT
Start: 2023-07-10

## 2023-07-10 RX ORDER — POTASSIUM CHLORIDE 1500 MG/1
TABLET, EXTENDED RELEASE ORAL
Qty: 60 TABLET | Refills: 0 | OUTPATIENT
Start: 2023-07-10

## 2023-07-25 ENCOUNTER — HOSPITAL ENCOUNTER (INPATIENT)
Age: 88
LOS: 6 days | Discharge: HOME HEALTH | DRG: 309 | End: 2023-07-31
Attending: EMERGENCY MEDICINE | Admitting: INTERNAL MEDICINE
Payer: MEDICARE

## 2023-07-25 ENCOUNTER — APPOINTMENT (OUTPATIENT)
Dept: GENERAL RADIOLOGY | Age: 88
DRG: 309 | End: 2023-07-25
Payer: MEDICARE

## 2023-07-25 DIAGNOSIS — E83.42 HYPOMAGNESEMIA: ICD-10-CM

## 2023-07-25 DIAGNOSIS — I48.91 ATRIAL FIBRILLATION WITH RVR (HCC): Primary | ICD-10-CM

## 2023-07-25 PROBLEM — E66.01 MORBID OBESITY (HCC): Status: ACTIVE | Noted: 2023-07-25

## 2023-07-25 PROBLEM — E78.5 HLD (HYPERLIPIDEMIA): Status: ACTIVE | Noted: 2023-07-25

## 2023-07-25 PROBLEM — E11.9 DM (DIABETES MELLITUS) (HCC): Status: ACTIVE | Noted: 2023-07-25

## 2023-07-25 PROBLEM — I10 HTN (HYPERTENSION): Status: ACTIVE | Noted: 2023-07-25

## 2023-07-25 LAB
ALBUMIN SERPL-MCNC: 3.9 G/DL (ref 3.4–5)
ALBUMIN/GLOB SERPL: 1.4 {RATIO} (ref 1.1–2.2)
ALP SERPL-CCNC: 52 U/L (ref 40–129)
ALT SERPL-CCNC: 21 U/L (ref 10–40)
ANION GAP SERPL CALCULATED.3IONS-SCNC: 14 MMOL/L (ref 3–16)
AST SERPL-CCNC: 23 U/L (ref 15–37)
BACTERIA URNS QL MICRO: ABNORMAL /HPF
BASOPHILS # BLD: 0.1 K/UL (ref 0–0.2)
BASOPHILS NFR BLD: 0.6 %
BILIRUB SERPL-MCNC: 0.5 MG/DL (ref 0–1)
BILIRUB UR QL STRIP.AUTO: NEGATIVE
BUN SERPL-MCNC: 20 MG/DL (ref 7–20)
CALCIUM SERPL-MCNC: 8.7 MG/DL (ref 8.3–10.6)
CHLORIDE SERPL-SCNC: 97 MMOL/L (ref 99–110)
CLARITY UR: CLEAR
CO2 SERPL-SCNC: 24 MMOL/L (ref 21–32)
COLOR UR: YELLOW
CREAT SERPL-MCNC: 1.3 MG/DL (ref 0.6–1.2)
DEPRECATED RDW RBC AUTO: 18.1 % (ref 12.4–15.4)
EOSINOPHIL # BLD: 0 K/UL (ref 0–0.6)
EOSINOPHIL NFR BLD: 0.4 %
EPI CELLS #/AREA URNS HPF: ABNORMAL /HPF (ref 0–5)
GFR SERPLBLD CREATININE-BSD FMLA CKD-EPI: 40 ML/MIN/{1.73_M2}
GLUCOSE BLD-MCNC: 125 MG/DL (ref 70–99)
GLUCOSE SERPL-MCNC: 170 MG/DL (ref 70–99)
GLUCOSE UR STRIP.AUTO-MCNC: NEGATIVE MG/DL
HCT VFR BLD AUTO: 29.1 % (ref 36–48)
HGB BLD-MCNC: 9.3 G/DL (ref 12–16)
HGB UR QL STRIP.AUTO: NEGATIVE
KETONES UR STRIP.AUTO-MCNC: NEGATIVE MG/DL
LEUKOCYTE ESTERASE UR QL STRIP.AUTO: ABNORMAL
LYMPHOCYTES # BLD: 0.9 K/UL (ref 1–5.1)
LYMPHOCYTES NFR BLD: 9.2 %
MAGNESIUM SERPL-MCNC: 1.4 MG/DL (ref 1.8–2.4)
MCH RBC QN AUTO: 24.5 PG (ref 26–34)
MCHC RBC AUTO-ENTMCNC: 32 G/DL (ref 31–36)
MCV RBC AUTO: 76.8 FL (ref 80–100)
MONOCYTES # BLD: 0.8 K/UL (ref 0–1.3)
MONOCYTES NFR BLD: 7.7 %
NEUTROPHILS # BLD: 8.3 K/UL (ref 1.7–7.7)
NEUTROPHILS NFR BLD: 82.1 %
NITRITE UR QL STRIP.AUTO: NEGATIVE
NT-PROBNP SERPL-MCNC: 5244 PG/ML (ref 0–449)
PERFORMED ON: ABNORMAL
PH UR STRIP.AUTO: 7 [PH] (ref 5–8)
PLATELET # BLD AUTO: 346 K/UL (ref 135–450)
PMV BLD AUTO: 7.8 FL (ref 5–10.5)
POTASSIUM SERPL-SCNC: 4.1 MMOL/L (ref 3.5–5.1)
PROCALCITONIN SERPL IA-MCNC: 0.05 NG/ML (ref 0–0.15)
PROT SERPL-MCNC: 6.7 G/DL (ref 6.4–8.2)
PROT UR STRIP.AUTO-MCNC: NEGATIVE MG/DL
RBC # BLD AUTO: 3.79 M/UL (ref 4–5.2)
RBC #/AREA URNS HPF: ABNORMAL /HPF (ref 0–4)
SODIUM SERPL-SCNC: 135 MMOL/L (ref 136–145)
SP GR UR STRIP.AUTO: 1.01 (ref 1–1.03)
TROPONIN, HIGH SENSITIVITY: 28 NG/L (ref 0–14)
TROPONIN, HIGH SENSITIVITY: 30 NG/L (ref 0–14)
UA COMPLETE W REFLEX CULTURE PNL UR: ABNORMAL
UA DIPSTICK W REFLEX MICRO PNL UR: YES
URN SPEC COLLECT METH UR: ABNORMAL
UROBILINOGEN UR STRIP-ACNC: 0.2 E.U./DL
WBC # BLD AUTO: 10.2 K/UL (ref 4–11)
WBC #/AREA URNS HPF: ABNORMAL /HPF (ref 0–5)

## 2023-07-25 PROCEDURE — 6360000002 HC RX W HCPCS: Performed by: PHYSICIAN ASSISTANT

## 2023-07-25 PROCEDURE — 84484 ASSAY OF TROPONIN QUANT: CPT

## 2023-07-25 PROCEDURE — 99285 EMERGENCY DEPT VISIT HI MDM: CPT

## 2023-07-25 PROCEDURE — 2500000003 HC RX 250 WO HCPCS: Performed by: PHYSICIAN ASSISTANT

## 2023-07-25 PROCEDURE — 83880 ASSAY OF NATRIURETIC PEPTIDE: CPT

## 2023-07-25 PROCEDURE — 6370000000 HC RX 637 (ALT 250 FOR IP): Performed by: NURSE PRACTITIONER

## 2023-07-25 PROCEDURE — 1200000000 HC SEMI PRIVATE

## 2023-07-25 PROCEDURE — 96374 THER/PROPH/DIAG INJ IV PUSH: CPT

## 2023-07-25 PROCEDURE — 85025 COMPLETE CBC W/AUTO DIFF WBC: CPT

## 2023-07-25 PROCEDURE — 2580000003 HC RX 258: Performed by: PHYSICIAN ASSISTANT

## 2023-07-25 PROCEDURE — 80053 COMPREHEN METABOLIC PANEL: CPT

## 2023-07-25 PROCEDURE — 93005 ELECTROCARDIOGRAM TRACING: CPT | Performed by: EMERGENCY MEDICINE

## 2023-07-25 PROCEDURE — 2580000003 HC RX 258: Performed by: NURSE PRACTITIONER

## 2023-07-25 PROCEDURE — 71045 X-RAY EXAM CHEST 1 VIEW: CPT

## 2023-07-25 PROCEDURE — 81001 URINALYSIS AUTO W/SCOPE: CPT

## 2023-07-25 PROCEDURE — 84145 PROCALCITONIN (PCT): CPT

## 2023-07-25 PROCEDURE — 83735 ASSAY OF MAGNESIUM: CPT

## 2023-07-25 PROCEDURE — 2060000000 HC ICU INTERMEDIATE R&B

## 2023-07-25 RX ORDER — PANTOPRAZOLE SODIUM 40 MG/10ML
40 INJECTION, POWDER, LYOPHILIZED, FOR SOLUTION INTRAVENOUS DAILY
Status: DISCONTINUED | OUTPATIENT
Start: 2023-07-26 | End: 2023-07-26

## 2023-07-25 RX ORDER — SODIUM CHLORIDE 0.9 % (FLUSH) 0.9 %
5-40 SYRINGE (ML) INJECTION EVERY 12 HOURS SCHEDULED
Status: DISCONTINUED | OUTPATIENT
Start: 2023-07-25 | End: 2023-07-31 | Stop reason: HOSPADM

## 2023-07-25 RX ORDER — TROSPIUM CHLORIDE 20 MG/1
20 TABLET, FILM COATED ORAL DAILY
Status: DISCONTINUED | OUTPATIENT
Start: 2023-07-26 | End: 2023-07-27

## 2023-07-25 RX ORDER — POTASSIUM CHLORIDE 20 MEQ/1
20 TABLET, EXTENDED RELEASE ORAL DAILY
Status: DISCONTINUED | OUTPATIENT
Start: 2023-07-26 | End: 2023-07-26

## 2023-07-25 RX ORDER — POLYETHYLENE GLYCOL 3350 17 G/17G
17 POWDER, FOR SOLUTION ORAL DAILY PRN
Status: DISCONTINUED | OUTPATIENT
Start: 2023-07-25 | End: 2023-07-31 | Stop reason: HOSPADM

## 2023-07-25 RX ORDER — SODIUM CHLORIDE 9 MG/ML
INJECTION, SOLUTION INTRAVENOUS CONTINUOUS
Status: DISCONTINUED | OUTPATIENT
Start: 2023-07-25 | End: 2023-07-27

## 2023-07-25 RX ORDER — INSULIN LISPRO 100 [IU]/ML
0-8 INJECTION, SOLUTION INTRAVENOUS; SUBCUTANEOUS
Status: DISCONTINUED | OUTPATIENT
Start: 2023-07-26 | End: 2023-07-31 | Stop reason: HOSPADM

## 2023-07-25 RX ORDER — DEXTROSE MONOHYDRATE 100 MG/ML
INJECTION, SOLUTION INTRAVENOUS CONTINUOUS PRN
Status: DISCONTINUED | OUTPATIENT
Start: 2023-07-25 | End: 2023-07-31 | Stop reason: HOSPADM

## 2023-07-25 RX ORDER — METOPROLOL SUCCINATE 25 MG/1
25 TABLET, EXTENDED RELEASE ORAL DAILY
Status: ON HOLD | COMMUNITY
End: 2023-07-31 | Stop reason: HOSPADM

## 2023-07-25 RX ORDER — DILTIAZEM HYDROCHLORIDE 5 MG/ML
20 INJECTION INTRAVENOUS ONCE
Status: DISCONTINUED | OUTPATIENT
Start: 2023-07-25 | End: 2023-07-25

## 2023-07-25 RX ORDER — DILTIAZEM HYDROCHLORIDE 5 MG/ML
20 INJECTION INTRAVENOUS ONCE
Status: COMPLETED | OUTPATIENT
Start: 2023-07-25 | End: 2023-07-25

## 2023-07-25 RX ORDER — ACETAMINOPHEN 650 MG/1
650 SUPPOSITORY RECTAL EVERY 6 HOURS PRN
Status: DISCONTINUED | OUTPATIENT
Start: 2023-07-25 | End: 2023-07-31 | Stop reason: HOSPADM

## 2023-07-25 RX ORDER — METOPROLOL SUCCINATE 25 MG/1
25 TABLET, EXTENDED RELEASE ORAL 2 TIMES DAILY
Status: DISCONTINUED | OUTPATIENT
Start: 2023-07-25 | End: 2023-07-26

## 2023-07-25 RX ORDER — SODIUM CHLORIDE 9 MG/ML
INJECTION, SOLUTION INTRAVENOUS PRN
Status: DISCONTINUED | OUTPATIENT
Start: 2023-07-25 | End: 2023-07-31 | Stop reason: HOSPADM

## 2023-07-25 RX ORDER — ALBUTEROL SULFATE 90 UG/1
AEROSOL, METERED RESPIRATORY (INHALATION)
COMMUNITY
Start: 2023-07-02

## 2023-07-25 RX ORDER — PRAVASTATIN SODIUM 20 MG
20 TABLET ORAL NIGHTLY
Status: DISCONTINUED | OUTPATIENT
Start: 2023-07-25 | End: 2023-07-31 | Stop reason: HOSPADM

## 2023-07-25 RX ORDER — GLUCAGON 1 MG/ML
1 KIT INJECTION PRN
Status: DISCONTINUED | OUTPATIENT
Start: 2023-07-25 | End: 2023-07-31 | Stop reason: HOSPADM

## 2023-07-25 RX ORDER — CHOLECALCIFEROL (VITAMIN D3) 125 MCG
1000 CAPSULE ORAL DAILY
Status: DISCONTINUED | OUTPATIENT
Start: 2023-07-26 | End: 2023-07-31 | Stop reason: HOSPADM

## 2023-07-25 RX ORDER — SODIUM CHLORIDE 0.9 % (FLUSH) 0.9 %
5-40 SYRINGE (ML) INJECTION PRN
Status: DISCONTINUED | OUTPATIENT
Start: 2023-07-25 | End: 2023-07-31 | Stop reason: HOSPADM

## 2023-07-25 RX ORDER — ALBUTEROL SULFATE 90 UG/1
2 AEROSOL, METERED RESPIRATORY (INHALATION) EVERY 6 HOURS PRN
Status: DISCONTINUED | OUTPATIENT
Start: 2023-07-25 | End: 2023-07-31 | Stop reason: HOSPADM

## 2023-07-25 RX ORDER — CHLORTHALIDONE 25 MG/1
25 TABLET ORAL DAILY
COMMUNITY
Start: 2023-02-08 | End: 2023-07-25

## 2023-07-25 RX ORDER — PROCHLORPERAZINE EDISYLATE 5 MG/ML
10 INJECTION INTRAMUSCULAR; INTRAVENOUS EVERY 6 HOURS PRN
Status: DISCONTINUED | OUTPATIENT
Start: 2023-07-25 | End: 2023-07-31 | Stop reason: HOSPADM

## 2023-07-25 RX ORDER — MAGNESIUM SULFATE IN WATER 40 MG/ML
2000 INJECTION, SOLUTION INTRAVENOUS ONCE
Status: COMPLETED | OUTPATIENT
Start: 2023-07-25 | End: 2023-07-25

## 2023-07-25 RX ORDER — ACETAMINOPHEN 325 MG/1
650 TABLET ORAL EVERY 6 HOURS PRN
Status: DISCONTINUED | OUTPATIENT
Start: 2023-07-25 | End: 2023-07-31 | Stop reason: HOSPADM

## 2023-07-25 RX ORDER — INSULIN GLARGINE 100 [IU]/ML
27 INJECTION, SOLUTION SUBCUTANEOUS NIGHTLY
Status: DISCONTINUED | OUTPATIENT
Start: 2023-07-25 | End: 2023-07-27

## 2023-07-25 RX ORDER — INSULIN LISPRO 100 [IU]/ML
0-4 INJECTION, SOLUTION INTRAVENOUS; SUBCUTANEOUS NIGHTLY
Status: DISCONTINUED | OUTPATIENT
Start: 2023-07-25 | End: 2023-07-31 | Stop reason: HOSPADM

## 2023-07-25 RX ADMIN — SODIUM CHLORIDE: 9 INJECTION, SOLUTION INTRAVENOUS at 22:35

## 2023-07-25 RX ADMIN — Medication 10 ML: at 22:54

## 2023-07-25 RX ADMIN — METOPROLOL SUCCINATE 25 MG: 25 TABLET, EXTENDED RELEASE ORAL at 22:25

## 2023-07-25 RX ADMIN — DILTIAZEM HYDROCHLORIDE 20 MG: 5 INJECTION INTRAVENOUS at 19:00

## 2023-07-25 RX ADMIN — APIXABAN 5 MG: 5 TABLET, FILM COATED ORAL at 22:25

## 2023-07-25 RX ADMIN — MAGNESIUM SULFATE HEPTAHYDRATE 2000 MG: 40 INJECTION, SOLUTION INTRAVENOUS at 19:57

## 2023-07-25 RX ADMIN — SODIUM CHLORIDE 5 MG/HR: 900 INJECTION, SOLUTION INTRAVENOUS at 19:03

## 2023-07-25 RX ADMIN — PRAVASTATIN SODIUM 20 MG: 20 TABLET ORAL at 22:25

## 2023-07-25 ASSESSMENT — PAIN DESCRIPTION - LOCATION: LOCATION: ANKLE

## 2023-07-25 ASSESSMENT — PAIN - FUNCTIONAL ASSESSMENT: PAIN_FUNCTIONAL_ASSESSMENT: 0-10

## 2023-07-25 ASSESSMENT — PAIN SCALES - GENERAL
PAINLEVEL_OUTOF10: 4
PAINLEVEL_OUTOF10: 0

## 2023-07-25 NOTE — ED PROVIDER NOTES
[facility]  Emergency Department Encounter    Patient Name: Casimiro Jeff  MRN: [id number]  YOB: 1935  Date of Evaluation: 7/25/2023  Provider: Torrie Pastor MD  Note Started: 6:46 PM EDT 7/25/23    CHIEF COMPLAINT  Shortness of Breath (Pt to ED per EMS with complaint of SOB that started earlier this morning. Per EMS, pt was noted to be in A fib RVR. Pt denies ant CP at this time. Pt also endorsing nausea. A/Ox4 at this time. )    SHARED SERVICE VISIT  I have seen and evaluated this patient with my supervising physician, Dr. Stefano Sheffield. HISTORY OF PRESENT ILLNESS  Casimiro Jeff is a 82 y.o. female who presents to the ED complaining of episode of dizziness/lightheadedness and shortness of breath. This occurred while patient was sitting at the table. Denies headaches. No visual changes. No difficulty speaking swallowing. No neck or back pain. Denies chest pain although did feel short of breath. Has had no numbness, tingling, weakness of extremities. No abdominal pain. No nausea or vomiting. Chronic leg swelling unchanged. She denies urinary symptoms. No fevers chills. .    No other complaints, modifying factors or associated symptoms. Nursing notes reviewed were all reviewed and agreed with or any disagreements were addressed in the HPI.     PMH:  Past Medical History:   Diagnosis Date    Breast cancer ([facility])     Cancer ([facility])     Diabetes mellitus ([facility])     GERD (gastroesophageal reflux disease)     History of therapeutic radiation     Hyperlipidemia     Hypertension     Influenza A 12/19/2017    Kidney stone      Surgical History:  Past Surgical History:   Procedure Laterality Date    BREAST LUMPECTOMY      right    COLONOSCOPY  8/31/2015    HYSTERECTOMY (CERVIX STATUS UNKNOWN)      LITHOTRIPSY      NERVE BLOCK Right 9/15/2022    RIGHT SACROILIAC JOINT INJECTION SITE CONFIRMED BY FLUOROSCOPY performed by Alverda Carrel, MD at [address] [place] [address]

## 2023-07-25 NOTE — ED NOTES
Pt now stating she thinks she might have a UTI. Pt states she gets them from time to time. Noted to be A fib RVR on cardiac monitor. BP stable at this time.       [address] Leonor Reynolds RN  07/25/23 6332

## 2023-07-25 NOTE — ED PROVIDER NOTES
I independently performed a history and physical on [address] All diagnostic, treatment, and disposition decisions were made by myself in conjunction with the advanced practice provider/resident. For further details of [address] emergency department encounter, please see the [facility]IL[facility]/resident's documentation. I personally saw the patient and performed a substantive portion of the visit including all aspects of the medical decision making. Briefly, this is a 44-year-old female presenting for evaluation of shortness of breath. Patient was found to be in A-fib with RVR with rates into the 140s. She has prior history of such. She is on anticoagulation. She is also on digoxin. Symptoms started several days ago. She is concerned about potential UTI. On exam, she has intact distal pulses. No overt respiratory distress. Troponin is mild elevated at 30. BNP is elevated at 5200, magnesium is low at 1.4. This will be repleted. She was started on diltiazem for rate control. She is agreeable with plan for admission. EKG  The Ekg interpreted by myself in the emergency department in the absence of a cardiologist.  atrial fibrillation with a rate of 140  Axis is   Normal  QTc is   500  Intervals and Durations are unremarkable. No specific ST-T wave changes appreciated. No evidence of acute ischemia. No significant change from prior EKG dated May 26, 2023    I personally saw the patient and independently provided 25 minutes of non-concurrent critical care out of the total shared critical care time provided. I, Dr. Tyler Jansen, am the primary clinician of record. Comment: Please note this report has been produced using speech recognition software and may contain errors related to that system including errors in grammar, punctuation, and spelling, as well as words and phrases that may be inappropriate.  If there are any questions or concerns please feel free to contact the

## 2023-07-26 LAB
ALBUMIN SERPL-MCNC: 4 G/DL (ref 3.4–5)
ALBUMIN/GLOB SERPL: 1.3 {RATIO} (ref 1.1–2.2)
ALP SERPL-CCNC: 54 U/L (ref 40–129)
ALT SERPL-CCNC: 20 U/L (ref 10–40)
ANION GAP SERPL CALCULATED.3IONS-SCNC: 16 MMOL/L (ref 3–16)
AST SERPL-CCNC: 21 U/L (ref 15–37)
BILIRUB SERPL-MCNC: 0.8 MG/DL (ref 0–1)
BUN SERPL-MCNC: 16 MG/DL (ref 7–20)
CALCIUM SERPL-MCNC: 8.9 MG/DL (ref 8.3–10.6)
CHLORIDE SERPL-SCNC: 97 MMOL/L (ref 99–110)
CO2 SERPL-SCNC: 23 MMOL/L (ref 21–32)
CREAT SERPL-MCNC: 0.9 MG/DL (ref 0.6–1.2)
DIGOXIN SERPL-MCNC: <0.3 NG/ML (ref 0.8–2)
EKG ATRIAL RATE: 182 BPM
EKG DIAGNOSIS: NORMAL
EKG Q-T INTERVAL: 328 MS
EKG QRS DURATION: 76 MS
EKG QTC CALCULATION (BAZETT): 500 MS
EKG R AXIS: -16 DEGREES
EKG T AXIS: 164 DEGREES
EKG VENTRICULAR RATE: 140 BPM
EST. AVERAGE GLUCOSE BLD GHB EST-MCNC: 174.3 MG/DL
GFR SERPLBLD CREATININE-BSD FMLA CKD-EPI: >60 ML/MIN/{1.73_M2}
GLUCOSE BLD-MCNC: 153 MG/DL (ref 70–99)
GLUCOSE BLD-MCNC: 184 MG/DL (ref 70–99)
GLUCOSE BLD-MCNC: 244 MG/DL (ref 70–99)
GLUCOSE BLD-MCNC: 304 MG/DL (ref 70–99)
GLUCOSE SERPL-MCNC: 103 MG/DL (ref 70–99)
HBA1C MFR BLD: 7.7 %
INR PPP: 1.67 (ref 0.84–1.16)
MAGNESIUM SERPL-MCNC: 1.9 MG/DL (ref 1.8–2.4)
PERFORMED ON: ABNORMAL
POTASSIUM SERPL-SCNC: 3.5 MMOL/L (ref 3.5–5.1)
PROT SERPL-MCNC: 7 G/DL (ref 6.4–8.2)
PROTHROMBIN TIME: 19.6 SEC (ref 11.5–14.8)
SODIUM SERPL-SCNC: 136 MMOL/L (ref 136–145)
TROPONIN, HIGH SENSITIVITY: 30 NG/L (ref 0–14)
TSH SERPL DL<=0.005 MIU/L-ACNC: 1.09 UIU/ML (ref 0.27–4.2)

## 2023-07-26 PROCEDURE — 2060000000 HC ICU INTERMEDIATE R&B

## 2023-07-26 PROCEDURE — 2500000003 HC RX 250 WO HCPCS: Performed by: NURSE PRACTITIONER

## 2023-07-26 PROCEDURE — 6360000002 HC RX W HCPCS: Performed by: NURSE PRACTITIONER

## 2023-07-26 PROCEDURE — 83735 ASSAY OF MAGNESIUM: CPT

## 2023-07-26 PROCEDURE — 93010 ELECTROCARDIOGRAM REPORT: CPT | Performed by: INTERNAL MEDICINE

## 2023-07-26 PROCEDURE — 97162 PT EVAL MOD COMPLEX 30 MIN: CPT

## 2023-07-26 PROCEDURE — C9113 INJ PANTOPRAZOLE SODIUM, VIA: HCPCS | Performed by: NURSE PRACTITIONER

## 2023-07-26 PROCEDURE — 2580000003 HC RX 258: Performed by: NURSE PRACTITIONER

## 2023-07-26 PROCEDURE — 97530 THERAPEUTIC ACTIVITIES: CPT

## 2023-07-26 PROCEDURE — 84443 ASSAY THYROID STIM HORMONE: CPT

## 2023-07-26 PROCEDURE — 80053 COMPREHEN METABOLIC PANEL: CPT

## 2023-07-26 PROCEDURE — 94660 CPAP INITIATION&MGMT: CPT

## 2023-07-26 PROCEDURE — 97535 SELF CARE MNGMENT TRAINING: CPT

## 2023-07-26 PROCEDURE — 97165 OT EVAL LOW COMPLEX 30 MIN: CPT

## 2023-07-26 PROCEDURE — 84484 ASSAY OF TROPONIN QUANT: CPT

## 2023-07-26 PROCEDURE — 85610 PROTHROMBIN TIME: CPT

## 2023-07-26 PROCEDURE — 6370000000 HC RX 637 (ALT 250 FOR IP): Performed by: INTERNAL MEDICINE

## 2023-07-26 PROCEDURE — 6370000000 HC RX 637 (ALT 250 FOR IP): Performed by: NURSE PRACTITIONER

## 2023-07-26 PROCEDURE — 80162 ASSAY OF DIGOXIN TOTAL: CPT

## 2023-07-26 PROCEDURE — 97116 GAIT TRAINING THERAPY: CPT

## 2023-07-26 PROCEDURE — 36415 COLL VENOUS BLD VENIPUNCTURE: CPT

## 2023-07-26 PROCEDURE — 83036 HEMOGLOBIN GLYCOSYLATED A1C: CPT

## 2023-07-26 RX ORDER — METOPROLOL SUCCINATE 50 MG/1
100 TABLET, EXTENDED RELEASE ORAL DAILY
Status: DISCONTINUED | OUTPATIENT
Start: 2023-07-27 | End: 2023-07-26

## 2023-07-26 RX ORDER — LANOLIN ALCOHOL/MO/W.PET/CERES
400 CREAM (GRAM) TOPICAL 2 TIMES DAILY
Status: COMPLETED | OUTPATIENT
Start: 2023-07-26 | End: 2023-07-26

## 2023-07-26 RX ORDER — HYDROXYZINE PAMOATE 25 MG/1
25 CAPSULE ORAL
Status: COMPLETED | OUTPATIENT
Start: 2023-07-26 | End: 2023-07-27

## 2023-07-26 RX ORDER — FUROSEMIDE 20 MG/1
20 TABLET ORAL DAILY
Status: DISCONTINUED | OUTPATIENT
Start: 2023-07-26 | End: 2023-07-27

## 2023-07-26 RX ORDER — PANTOPRAZOLE SODIUM 40 MG/1
40 TABLET, DELAYED RELEASE ORAL
Status: DISCONTINUED | OUTPATIENT
Start: 2023-07-27 | End: 2023-07-31 | Stop reason: HOSPADM

## 2023-07-26 RX ORDER — METOPROLOL SUCCINATE 50 MG/1
100 TABLET, EXTENDED RELEASE ORAL 2 TIMES DAILY
Status: DISCONTINUED | OUTPATIENT
Start: 2023-07-26 | End: 2023-07-31 | Stop reason: HOSPADM

## 2023-07-26 RX ORDER — POTASSIUM CHLORIDE 20 MEQ/1
40 TABLET, EXTENDED RELEASE ORAL ONCE
Status: COMPLETED | OUTPATIENT
Start: 2023-07-26 | End: 2023-07-26

## 2023-07-26 RX ORDER — POTASSIUM CHLORIDE 20 MEQ/1
40 TABLET, EXTENDED RELEASE ORAL ONCE
Status: DISCONTINUED | OUTPATIENT
Start: 2023-07-26 | End: 2023-07-26

## 2023-07-26 RX ADMIN — TROSPIUM CHLORIDE 20 MG: 20 TABLET, FILM COATED ORAL at 08:45

## 2023-07-26 RX ADMIN — PANTOPRAZOLE SODIUM 40 MG: 40 INJECTION, POWDER, FOR SOLUTION INTRAVENOUS at 08:33

## 2023-07-26 RX ADMIN — FUROSEMIDE 20 MG: 20 TABLET ORAL at 08:54

## 2023-07-26 RX ADMIN — Medication 400 MG: at 11:04

## 2023-07-26 RX ADMIN — METOPROLOL SUCCINATE 25 MG: 25 TABLET, EXTENDED RELEASE ORAL at 08:53

## 2023-07-26 RX ADMIN — METOPROLOL SUCCINATE 100 MG: 50 TABLET, EXTENDED RELEASE ORAL at 20:07

## 2023-07-26 RX ADMIN — PRAVASTATIN SODIUM 20 MG: 20 TABLET ORAL at 20:07

## 2023-07-26 RX ADMIN — CYANOCOBALAMIN TAB 500 MCG 1000 MCG: 500 TAB at 08:55

## 2023-07-26 RX ADMIN — POTASSIUM CHLORIDE 40 MEQ: 1500 TABLET, EXTENDED RELEASE ORAL at 17:12

## 2023-07-26 RX ADMIN — POTASSIUM CHLORIDE 20 MEQ: 1500 TABLET, EXTENDED RELEASE ORAL at 08:52

## 2023-07-26 RX ADMIN — APIXABAN 5 MG: 5 TABLET, FILM COATED ORAL at 20:07

## 2023-07-26 RX ADMIN — APIXABAN 5 MG: 5 TABLET, FILM COATED ORAL at 08:54

## 2023-07-26 RX ADMIN — Medication 400 MG: at 20:07

## 2023-07-26 RX ADMIN — SODIUM CHLORIDE 7.5 MG/HR: 900 INJECTION, SOLUTION INTRAVENOUS at 07:02

## 2023-07-26 RX ADMIN — PROCHLORPERAZINE EDISYLATE 10 MG: 5 INJECTION INTRAMUSCULAR; INTRAVENOUS at 22:37

## 2023-07-26 RX ADMIN — INSULIN LISPRO 6 UNITS: 100 INJECTION, SOLUTION INTRAVENOUS; SUBCUTANEOUS at 11:28

## 2023-07-26 RX ADMIN — METOPROLOL SUCCINATE 75 MG: 50 TABLET, EXTENDED RELEASE ORAL at 11:04

## 2023-07-26 RX ADMIN — Medication 20 ML: at 20:20

## 2023-07-26 RX ADMIN — ACETAMINOPHEN 650 MG: 325 TABLET ORAL at 20:53

## 2023-07-26 RX ADMIN — INSULIN GLARGINE 27 UNITS: 100 INJECTION, SOLUTION SUBCUTANEOUS at 20:07

## 2023-07-26 ASSESSMENT — PAIN SCALES - GENERAL
PAINLEVEL_OUTOF10: 0
PAINLEVEL_OUTOF10: 6
PAINLEVEL_OUTOF10: 0

## 2023-07-26 ASSESSMENT — PAIN DESCRIPTION - LOCATION: LOCATION: ANKLE

## 2023-07-26 ASSESSMENT — PAIN DESCRIPTION - ORIENTATION: ORIENTATION: RIGHT

## 2023-07-26 ASSESSMENT — PAIN DESCRIPTION - DESCRIPTORS: DESCRIPTORS: ACHING

## 2023-07-26 NOTE — H&P
appear to clearly understand what medications she should be on. She has been hospitalized for Afib RVR recently in 05/2023. Plan to Cont Diltiazem drip to regulate rate and rhythm, EKG in the AM, and Cardiology c/s for treatment plan. The patient has been admitted for further evaluation and treatment. She denied any other associated symptoms as well as any aggravating and/or alleviating factors. At the time of this assessment, the patient was resting comfortably in bed. She currently denies any back pain, abdominal pain, numbness, tingling, N/V/C/D, fever and/or chills.      Assessment/Plan:      Atrial fibrillation with rapid ventricular response (HCC)    Atrial fibrillation with RVR in patient with known atrial fibrillation  -EKG in ED showed Afib RVR, rates in 140's  -Received Diltiazem bolus 20mg in ED and Dilt gtt initiated, gtt contiued 7/25/2023  -pt reports no longer takes digoxin  -Check TSH in AM  -Cont home Eliquis  -Tele monitoring  -Card's c/s for med mgmt- appreciate recommendation in advance    Elevated Troponin   -Serial trops ordered, 30>>>28.  -likely 2/2 afib RVR  -no c/o chest pain  -tele monitoring    Elevated Pro-BNP in patient with no known CHF  -5200 in ED  -CXR in ED showed Focal opacity within the peripheral aspect of the mid right lung, possibly representing either atelectasis or pneumonia and Chronic elevation of the right hemidiaphragm with chronic compressive atelectasis within the right lung base  -Patient without respiratory distress, stable on baseline RA  -Echo in 05/23 showed reduced EF 40%  -Daily weights  -Strict I's&O's  -continue home lasix    Mild FLORES  -Likely 2/2 decreased PO intake vs diuretic use  -Creatinine 1.3 on admission, baseline appears to be 1.0  -Gentle IVF  -Recheck labs in AM  -Furosemide on hold (patient reports not taking it, used to take it PRN)    Hypomagnesemia  -Mag of 1.4 in ED, 2G of mag replaced in ED  -Recheck in AM    DM2, uncontrolled  - in

## 2023-07-26 NOTE — DISCHARGE INSTRUCTIONS
expended. Elements of Energy Conservation:   Prioritize/Plan: Decide what needs to be done today, and what can wait for a later date, write to do lists, Plan ahead to avoid extra trips, Gather supplies and equipment needed before starting an activity. Position: Avoid tiring and awkward posture that may impair breathing. Pace: Slow and steady pace, never rushing! Pursed lip breathing. Pursed Lip Breathing: \"Smell the roses, blow out the candles\". Follow up with PCP within 1-2 weeks.

## 2023-07-27 LAB
ANION GAP SERPL CALCULATED.3IONS-SCNC: 18 MMOL/L (ref 3–16)
BACTERIA URNS QL MICRO: ABNORMAL /HPF
BASE EXCESS BLDV CALC-SCNC: -2.1 MMOL/L (ref -3–3)
BASOPHILS # BLD: 0.1 K/UL (ref 0–0.2)
BASOPHILS NFR BLD: 0.6 %
BILIRUB UR QL STRIP.AUTO: NEGATIVE
BUN SERPL-MCNC: 21 MG/DL (ref 7–20)
CALCIUM SERPL-MCNC: 8.2 MG/DL (ref 8.3–10.6)
CHLORIDE SERPL-SCNC: 90 MMOL/L (ref 99–110)
CLARITY UR: CLEAR
CO2 BLDV-SCNC: 20 MMOL/L
CO2 SERPL-SCNC: 19 MMOL/L (ref 21–32)
COHGB MFR BLDV: 2.5 % (ref 0–1.5)
COLOR UR: YELLOW
CREAT SERPL-MCNC: 1.4 MG/DL (ref 0.6–1.2)
DEPRECATED RDW RBC AUTO: 18.4 % (ref 12.4–15.4)
EOSINOPHIL # BLD: 0.1 K/UL (ref 0–0.6)
EOSINOPHIL NFR BLD: 0.5 %
GFR SERPLBLD CREATININE-BSD FMLA CKD-EPI: 37 ML/MIN/{1.73_M2}
GLUCOSE BLD-MCNC: 164 MG/DL (ref 70–99)
GLUCOSE BLD-MCNC: 186 MG/DL (ref 70–99)
GLUCOSE BLD-MCNC: 210 MG/DL (ref 70–99)
GLUCOSE BLD-MCNC: 217 MG/DL (ref 70–99)
GLUCOSE SERPL-MCNC: 237 MG/DL (ref 70–99)
GLUCOSE UR STRIP.AUTO-MCNC: NEGATIVE MG/DL
HCO3 BLDV-SCNC: 19.7 MMOL/L (ref 23–29)
HCT VFR BLD AUTO: 30.3 % (ref 36–48)
HGB BLD-MCNC: 9.5 G/DL (ref 12–16)
HGB UR QL STRIP.AUTO: NEGATIVE
KETONES UR STRIP.AUTO-MCNC: ABNORMAL MG/DL
LEUKOCYTE ESTERASE UR QL STRIP.AUTO: NEGATIVE
LYMPHOCYTES # BLD: 1 K/UL (ref 1–5.1)
LYMPHOCYTES NFR BLD: 8.1 %
MAGNESIUM SERPL-MCNC: 1.9 MG/DL (ref 1.8–2.4)
MCH RBC QN AUTO: 24.4 PG (ref 26–34)
MCHC RBC AUTO-ENTMCNC: 31.3 G/DL (ref 31–36)
MCV RBC AUTO: 77.7 FL (ref 80–100)
METHGB MFR BLDV: 0.6 %
MONOCYTES # BLD: 0.9 K/UL (ref 0–1.3)
MONOCYTES NFR BLD: 7.8 %
NEUTROPHILS # BLD: 10 K/UL (ref 1.7–7.7)
NEUTROPHILS NFR BLD: 83 %
NITRITE UR QL STRIP.AUTO: NEGATIVE
O2 THERAPY: ABNORMAL
PCO2 BLDV: 25 MMHG (ref 40–50)
PERFORMED ON: ABNORMAL
PH BLDV: 7.51 [PH] (ref 7.35–7.45)
PH UR STRIP.AUTO: 6 [PH] (ref 5–8)
PLATELET # BLD AUTO: 386 K/UL (ref 135–450)
PMV BLD AUTO: 8 FL (ref 5–10.5)
PO2 BLDV: 109.6 MMHG (ref 25–40)
POTASSIUM SERPL-SCNC: 4.6 MMOL/L (ref 3.5–5.1)
PROCALCITONIN SERPL IA-MCNC: 0.05 NG/ML (ref 0–0.15)
PROT UR STRIP.AUTO-MCNC: ABNORMAL MG/DL
RBC # BLD AUTO: 3.9 M/UL (ref 4–5.2)
RBC #/AREA URNS HPF: ABNORMAL /HPF (ref 0–4)
SAO2 % BLDV: 98 %
SARS-COV-2 RDRP RESP QL NAA+PROBE: NOT DETECTED
SODIUM SERPL-SCNC: 127 MMOL/L (ref 136–145)
SP GR UR STRIP.AUTO: 1.02 (ref 1–1.03)
UA COMPLETE W REFLEX CULTURE PNL UR: ABNORMAL
UA DIPSTICK W REFLEX MICRO PNL UR: YES
URN SPEC COLLECT METH UR: ABNORMAL
UROBILINOGEN UR STRIP-ACNC: 0.2 E.U./DL
WBC # BLD AUTO: 12 K/UL (ref 4–11)
WBC #/AREA URNS HPF: ABNORMAL /HPF (ref 0–5)

## 2023-07-27 PROCEDURE — 84145 PROCALCITONIN (PCT): CPT

## 2023-07-27 PROCEDURE — 36415 COLL VENOUS BLD VENIPUNCTURE: CPT

## 2023-07-27 PROCEDURE — 87635 SARS-COV-2 COVID-19 AMP PRB: CPT

## 2023-07-27 PROCEDURE — 6370000000 HC RX 637 (ALT 250 FOR IP): Performed by: NURSE PRACTITIONER

## 2023-07-27 PROCEDURE — 6360000002 HC RX W HCPCS: Performed by: NURSE PRACTITIONER

## 2023-07-27 PROCEDURE — 80048 BASIC METABOLIC PNL TOTAL CA: CPT

## 2023-07-27 PROCEDURE — 93005 ELECTROCARDIOGRAM TRACING: CPT | Performed by: INTERNAL MEDICINE

## 2023-07-27 PROCEDURE — 2580000003 HC RX 258: Performed by: INTERNAL MEDICINE

## 2023-07-27 PROCEDURE — 6360000002 HC RX W HCPCS: Performed by: INTERNAL MEDICINE

## 2023-07-27 PROCEDURE — 6370000000 HC RX 637 (ALT 250 FOR IP): Performed by: INTERNAL MEDICINE

## 2023-07-27 PROCEDURE — 81001 URINALYSIS AUTO W/SCOPE: CPT

## 2023-07-27 PROCEDURE — 99223 1ST HOSP IP/OBS HIGH 75: CPT | Performed by: INTERNAL MEDICINE

## 2023-07-27 PROCEDURE — 85025 COMPLETE CBC W/AUTO DIFF WBC: CPT

## 2023-07-27 PROCEDURE — 51701 INSERT BLADDER CATHETER: CPT

## 2023-07-27 PROCEDURE — 51798 US URINE CAPACITY MEASURE: CPT

## 2023-07-27 PROCEDURE — 2060000000 HC ICU INTERMEDIATE R&B

## 2023-07-27 PROCEDURE — 82803 BLOOD GASES ANY COMBINATION: CPT

## 2023-07-27 PROCEDURE — 83735 ASSAY OF MAGNESIUM: CPT

## 2023-07-27 RX ORDER — SOTALOL HYDROCHLORIDE 80 MG/1
40 TABLET ORAL 2 TIMES DAILY
Status: DISCONTINUED | OUTPATIENT
Start: 2023-07-27 | End: 2023-07-29

## 2023-07-27 RX ORDER — INSULIN GLARGINE 100 [IU]/ML
50 INJECTION, SOLUTION SUBCUTANEOUS NIGHTLY
Status: DISCONTINUED | OUTPATIENT
Start: 2023-07-27 | End: 2023-07-31 | Stop reason: HOSPADM

## 2023-07-27 RX ORDER — MAGNESIUM SULFATE 1 G/100ML
1000 INJECTION INTRAVENOUS ONCE
Status: COMPLETED | OUTPATIENT
Start: 2023-07-27 | End: 2023-07-27

## 2023-07-27 RX ORDER — INSULIN GLARGINE 100 [IU]/ML
34 INJECTION, SOLUTION SUBCUTANEOUS NIGHTLY
Status: DISCONTINUED | OUTPATIENT
Start: 2023-07-27 | End: 2023-07-27

## 2023-07-27 RX ORDER — SODIUM CHLORIDE 9 MG/ML
INJECTION, SOLUTION INTRAVENOUS CONTINUOUS
Status: ACTIVE | OUTPATIENT
Start: 2023-07-27 | End: 2023-07-27

## 2023-07-27 RX ORDER — TROSPIUM CHLORIDE 20 MG/1
20 TABLET, FILM COATED ORAL
Status: DISCONTINUED | OUTPATIENT
Start: 2023-07-27 | End: 2023-07-31 | Stop reason: HOSPADM

## 2023-07-27 RX ORDER — LORAZEPAM 2 MG/ML
0.5 INJECTION INTRAMUSCULAR ONCE
Status: COMPLETED | OUTPATIENT
Start: 2023-07-27 | End: 2023-07-27

## 2023-07-27 RX ADMIN — INSULIN LISPRO 2 UNITS: 100 INJECTION, SOLUTION INTRAVENOUS; SUBCUTANEOUS at 08:45

## 2023-07-27 RX ADMIN — FUROSEMIDE 20 MG: 20 TABLET ORAL at 08:44

## 2023-07-27 RX ADMIN — MAGNESIUM SULFATE HEPTAHYDRATE 1000 MG: 1 INJECTION, SOLUTION INTRAVENOUS at 11:52

## 2023-07-27 RX ADMIN — APIXABAN 5 MG: 5 TABLET, FILM COATED ORAL at 20:25

## 2023-07-27 RX ADMIN — METOPROLOL SUCCINATE 100 MG: 50 TABLET, EXTENDED RELEASE ORAL at 08:44

## 2023-07-27 RX ADMIN — PANTOPRAZOLE SODIUM 40 MG: 40 TABLET, DELAYED RELEASE ORAL at 08:44

## 2023-07-27 RX ADMIN — METOPROLOL SUCCINATE 100 MG: 50 TABLET, EXTENDED RELEASE ORAL at 20:26

## 2023-07-27 RX ADMIN — HYDROXYZINE PAMOATE 25 MG: 25 CAPSULE ORAL at 00:01

## 2023-07-27 RX ADMIN — SOTALOL HYDROCHLORIDE 40 MG: 80 TABLET ORAL at 20:26

## 2023-07-27 RX ADMIN — INSULIN GLARGINE 50 UNITS: 100 INJECTION, SOLUTION SUBCUTANEOUS at 20:26

## 2023-07-27 RX ADMIN — APIXABAN 5 MG: 5 TABLET, FILM COATED ORAL at 08:44

## 2023-07-27 RX ADMIN — LORAZEPAM 0.5 MG: 2 INJECTION INTRAMUSCULAR; INTRAVENOUS at 01:58

## 2023-07-27 RX ADMIN — CYANOCOBALAMIN TAB 500 MCG 1000 MCG: 500 TAB at 08:44

## 2023-07-27 RX ADMIN — PRAVASTATIN SODIUM 20 MG: 20 TABLET ORAL at 20:26

## 2023-07-27 RX ADMIN — TROSPIUM CHLORIDE 20 MG: 20 TABLET, FILM COATED ORAL at 08:55

## 2023-07-27 RX ADMIN — TROSPIUM CHLORIDE 20 MG: 20 TABLET, FILM COATED ORAL at 16:56

## 2023-07-27 RX ADMIN — SODIUM CHLORIDE: 9 INJECTION, SOLUTION INTRAVENOUS at 11:48

## 2023-07-27 RX ADMIN — INSULIN LISPRO 2 UNITS: 100 INJECTION, SOLUTION INTRAVENOUS; SUBCUTANEOUS at 12:29

## 2023-07-27 RX ADMIN — ACETAMINOPHEN 650 MG: 325 TABLET ORAL at 13:02

## 2023-07-27 ASSESSMENT — PAIN DESCRIPTION - ORIENTATION
ORIENTATION: RIGHT
ORIENTATION: RIGHT

## 2023-07-27 ASSESSMENT — ENCOUNTER SYMPTOMS
HEMATEMESIS: 0
HEMATOCHEZIA: 0
STRIDOR: 0
SLEEP DISTURBANCES DUE TO BREATHING: 1
WHEEZING: 0
LEFT EYE: 0
RIGHT EYE: 0
SHORTNESS OF BREATH: 1
SNORING: 1

## 2023-07-27 ASSESSMENT — PAIN SCALES - GENERAL
PAINLEVEL_OUTOF10: 3
PAINLEVEL_OUTOF10: 3
PAINLEVEL_OUTOF10: 7
PAINLEVEL_OUTOF10: 0

## 2023-07-27 ASSESSMENT — PAIN DESCRIPTION - LOCATION
LOCATION: ANKLE
LOCATION: ANKLE

## 2023-07-27 ASSESSMENT — PAIN DESCRIPTION - DESCRIPTORS
DESCRIPTORS: ACHING
DESCRIPTORS: ACHING

## 2023-07-27 NOTE — ACP (ADVANCE CARE PLANNING)
questions/concerns   [] Advised patient/agent/surrogate to review completed ACP document and update if needed with changes in condition, patient preferences or care setting    [] This note routed to one or more involved healthcare providers

## 2023-07-27 NOTE — CONSULTS
regurgitation. Systolic pulmonary artery pressure (SPAP) is normal and estimated at 30 mmHg   (right atrial pressure 3 mmHg). Right Atrium   The right atrium is mildly enlarged. Right atrial area is 22.3 cm2. Right atrial volume is 30.6 ml/m2. Pulmonic Valve   The pulmonic valve is normal in structure and function   No evidence of pulmonic regurgitation. Pericardial Effusion   Prominent anterior visceral fat pad noted. Trivial circumferential pericardial effusion. Pleural Effusion   No pleural effusion noted. Miscellaneous   No obvious masses, thrombi, or vegetations are noted. IVC is normal in size (<2.1 cm) and collapses > 50% with respiration   consistent with normal right atrial pressure (3 mmHg).      M-Mode/2D Measurements (cm)      LV Diastolic Dimension: 5.56 cm LV Systolic Dimension: 1.37 cm   LV Septum Diastolic: 7.82 cm   LV PW Diastolic: 7.00 cm        AO Root Dimension: 3.3 cm   RV Diastolic Dimension: 1.21 cm LA Dimension: 4.3 cm                                   LA Area: 30.6 cm2   LVOT: 2.1 cm                    LA volume/Index: 114 ml /54 ml/m2     Doppler Measurements      AV Peak Velocity: 114 cm/s    MV Peak E-Wave: 87.4 cm/s   AV Peak Gradient: 5.2 mmHg   LVOT Peak Velocity: 78.8 cm/s      TR Velocity:258 cm/s   TR Gradient:26.63 mmHg   Estimated RAP:3 mmHg   Estimated RVSP: 30 mmHg   E' Septal Velocity: 5.91 cm/s   E/E' ratio: 17.1      Aortic Valve      Peak Velocity: 114 cm/s   Peak Gradient: 5.2 mmHg     Aorta      Aortic Root: 3.3 cm   Ascending Aorta: 3.3 cm   LVOT Diameter: 2.1 cm          Telemetry:    atrial fibrillation with intermittent rapid ventricular response     Lab Review:    Recent Labs     07/25/23 1847 07/27/23 0211   WBC 10.2 12.0*   HGB 9.3* 9.5*   HCT 29.1* 30.3*   MCV 76.8* 77.7*    386       Recent Labs     07/25/23 1847 07/26/23  0459 07/27/23 0211   * 136 127*   K 4.1 3.5 4.6   CL 97* 97* 90*   CO2 24 23 19*   BUN 20

## 2023-07-27 NOTE — CARE COORDINATION
Case Management Assessment  Initial Evaluation    Date/Time of Evaluation: 7/27/2023 11:31 AM  Assessment Completed by: Stephanie Looney RN    If patient is discharged prior to next notation, then this note serves as note for discharge by case management. Patient Name: Margarito Andres                   YOB: 1935  Diagnosis: Hypomagnesemia [E83.42]  Atrial fibrillation with RVR ([address]) [I48.91]                   Date / Time: 7/25/2023  5:49 PM    Patient Admission Status: Inpatient   Readmission Risk (Low < 19, Mod (19-27), High > 27): Readmission Risk Score: 19.8    Current PCP: Durwood Oppenheim, MD  PCP verified by CM? Yes    Chart Reviewed: Yes      History Provided by: Child/Family  Patient Orientation:      Patient Cognition:      Hospitalization in the last 30 days (Readmission):  No    If yes, Readmission Assessment in  Navigator will be completed. Advance Directives:      Code Status: Limited   Patient's Primary Decision Maker is: Legal Next of Kin    [facility] - [phone]    Secondary Decision Maker: Justen Palmer  [phone]    Discharge Planning:    Patient lives with: Alone Type of Home: Apartment  Primary Care Giver: Self  Patient Support Systems include: Family Members   Current Financial resources: Medicare  Current community resources:    Current services prior to admission: Durable Medical Equipment            Current DME: Rima Garnett (Comment) (scooter)            Type of Home Care services:  None    ADLS  Prior functional level: Independent in ADLs/IADLs  Current functional level: Independent in ADLs/IADLs    PT AM-PAC: 18 /24  OT AM-PAC: 21 /24    Family can provide assistance at DC: Yes  Would you like Case Management to discuss the discharge plan with any other family members/significant others, and if so, who? Yes  Plans to Return to Present Housing: Unknown at present  Potential Assistance needed at discharge:  Other (Comment)

## 2023-07-28 ENCOUNTER — ANESTHESIA (OUTPATIENT)
Dept: CARDIAC CATH/INVASIVE PROCEDURES | Age: 88
DRG: 309 | End: 2023-07-28
Payer: MEDICARE

## 2023-07-28 ENCOUNTER — APPOINTMENT (OUTPATIENT)
Dept: CARDIAC CATH/INVASIVE PROCEDURES | Age: 88
DRG: 309 | End: 2023-07-28
Payer: MEDICARE

## 2023-07-28 ENCOUNTER — ANESTHESIA EVENT (OUTPATIENT)
Dept: CARDIAC CATH/INVASIVE PROCEDURES | Age: 88
DRG: 309 | End: 2023-07-28
Payer: MEDICARE

## 2023-07-28 LAB
ANION GAP SERPL CALCULATED.3IONS-SCNC: 16 MMOL/L (ref 3–16)
BUN SERPL-MCNC: 31 MG/DL (ref 7–20)
CALCIUM SERPL-MCNC: 9.3 MG/DL (ref 8.3–10.6)
CHLORIDE SERPL-SCNC: 92 MMOL/L (ref 99–110)
CO2 SERPL-SCNC: 20 MMOL/L (ref 21–32)
CREAT SERPL-MCNC: 1.3 MG/DL (ref 0.6–1.2)
DEPRECATED RDW RBC AUTO: 18.3 % (ref 12.4–15.4)
EKG ATRIAL RATE: 136 BPM
EKG ATRIAL RATE: 57 BPM
EKG DIAGNOSIS: NORMAL
EKG DIAGNOSIS: NORMAL
EKG P AXIS: 66 DEGREES
EKG P-R INTERVAL: 230 MS
EKG Q-T INTERVAL: 328 MS
EKG Q-T INTERVAL: 344 MS
EKG QRS DURATION: 78 MS
EKG QRS DURATION: 84 MS
EKG QTC CALCULATION (BAZETT): 334 MS
EKG QTC CALCULATION (BAZETT): 489 MS
EKG R AXIS: -1 DEGREES
EKG R AXIS: 15 DEGREES
EKG T AXIS: 134 DEGREES
EKG T AXIS: 167 DEGREES
EKG VENTRICULAR RATE: 134 BPM
EKG VENTRICULAR RATE: 57 BPM
GFR SERPLBLD CREATININE-BSD FMLA CKD-EPI: 40 ML/MIN/{1.73_M2}
GLUCOSE BLD-MCNC: 168 MG/DL (ref 70–99)
GLUCOSE BLD-MCNC: 186 MG/DL (ref 70–99)
GLUCOSE BLD-MCNC: 215 MG/DL (ref 70–99)
GLUCOSE SERPL-MCNC: 146 MG/DL (ref 70–99)
HCT VFR BLD AUTO: 33.2 % (ref 36–48)
HGB BLD-MCNC: 10.5 G/DL (ref 12–16)
LV EF: 40 %
LVEF MODALITY: NORMAL
MCH RBC QN AUTO: 24.7 PG (ref 26–34)
MCHC RBC AUTO-ENTMCNC: 31.5 G/DL (ref 31–36)
MCV RBC AUTO: 78.3 FL (ref 80–100)
PERFORMED ON: ABNORMAL
PLATELET # BLD AUTO: 400 K/UL (ref 135–450)
PMV BLD AUTO: 8.4 FL (ref 5–10.5)
POTASSIUM SERPL-SCNC: 4.2 MMOL/L (ref 3.5–5.1)
RBC # BLD AUTO: 4.24 M/UL (ref 4–5.2)
SODIUM SERPL-SCNC: 128 MMOL/L (ref 136–145)
WBC # BLD AUTO: 9.9 K/UL (ref 4–11)

## 2023-07-28 PROCEDURE — 80048 BASIC METABOLIC PNL TOTAL CA: CPT

## 2023-07-28 PROCEDURE — 6370000000 HC RX 637 (ALT 250 FOR IP): Performed by: NURSE PRACTITIONER

## 2023-07-28 PROCEDURE — 6360000002 HC RX W HCPCS: Performed by: NURSE ANESTHETIST, CERTIFIED REGISTERED

## 2023-07-28 PROCEDURE — 93010 ELECTROCARDIOGRAM REPORT: CPT | Performed by: INTERNAL MEDICINE

## 2023-07-28 PROCEDURE — 93312 ECHO TRANSESOPHAGEAL: CPT | Performed by: INTERNAL MEDICINE

## 2023-07-28 PROCEDURE — 2580000003 HC RX 258: Performed by: NURSE ANESTHETIST, CERTIFIED REGISTERED

## 2023-07-28 PROCEDURE — 3700000001 HC ADD 15 MINUTES (ANESTHESIA)

## 2023-07-28 PROCEDURE — 93320 DOPPLER ECHO COMPLETE: CPT

## 2023-07-28 PROCEDURE — 3700000000 HC ANESTHESIA ATTENDED CARE

## 2023-07-28 PROCEDURE — 94640 AIRWAY INHALATION TREATMENT: CPT

## 2023-07-28 PROCEDURE — B24BZZ4 ULTRASONOGRAPHY OF HEART WITH AORTA, TRANSESOPHAGEAL: ICD-10-PCS | Performed by: INTERNAL MEDICINE

## 2023-07-28 PROCEDURE — 97116 GAIT TRAINING THERAPY: CPT

## 2023-07-28 PROCEDURE — 85027 COMPLETE CBC AUTOMATED: CPT

## 2023-07-28 PROCEDURE — 97110 THERAPEUTIC EXERCISES: CPT

## 2023-07-28 PROCEDURE — 93312 ECHO TRANSESOPHAGEAL: CPT

## 2023-07-28 PROCEDURE — 92960 CARDIOVERSION ELECTRIC EXT: CPT

## 2023-07-28 PROCEDURE — 5A2204Z RESTORATION OF CARDIAC RHYTHM, SINGLE: ICD-10-PCS | Performed by: INTERNAL MEDICINE

## 2023-07-28 PROCEDURE — 2580000003 HC RX 258: Performed by: NURSE PRACTITIONER

## 2023-07-28 PROCEDURE — 6370000000 HC RX 637 (ALT 250 FOR IP): Performed by: INTERNAL MEDICINE

## 2023-07-28 PROCEDURE — 93325 DOPPLER ECHO COLOR FLOW MAPG: CPT

## 2023-07-28 PROCEDURE — 92960 CARDIOVERSION ELECTRIC EXT: CPT | Performed by: INTERNAL MEDICINE

## 2023-07-28 PROCEDURE — 93005 ELECTROCARDIOGRAM TRACING: CPT | Performed by: INTERNAL MEDICINE

## 2023-07-28 PROCEDURE — 2060000000 HC ICU INTERMEDIATE R&B

## 2023-07-28 RX ORDER — SODIUM CHLORIDE 9 MG/ML
INJECTION, SOLUTION INTRAVENOUS CONTINUOUS PRN
Status: DISCONTINUED | OUTPATIENT
Start: 2023-07-28 | End: 2023-07-28 | Stop reason: SDUPTHER

## 2023-07-28 RX ORDER — SODIUM CHLORIDE 0.9 % (FLUSH) 0.9 %
5-40 SYRINGE (ML) INJECTION PRN
Status: DISCONTINUED | OUTPATIENT
Start: 2023-07-28 | End: 2023-07-31 | Stop reason: HOSPADM

## 2023-07-28 RX ORDER — IPRATROPIUM BROMIDE AND ALBUTEROL SULFATE 2.5; .5 MG/3ML; MG/3ML
1 SOLUTION RESPIRATORY (INHALATION) ONCE
Status: DISCONTINUED | OUTPATIENT
Start: 2023-07-28 | End: 2023-07-28

## 2023-07-28 RX ORDER — PROPOFOL 10 MG/ML
INJECTION, EMULSION INTRAVENOUS PRN
Status: DISCONTINUED | OUTPATIENT
Start: 2023-07-28 | End: 2023-07-28 | Stop reason: SDUPTHER

## 2023-07-28 RX ADMIN — PRAVASTATIN SODIUM 20 MG: 20 TABLET ORAL at 21:22

## 2023-07-28 RX ADMIN — Medication 2 PUFF: at 09:53

## 2023-07-28 RX ADMIN — APIXABAN 5 MG: 5 TABLET, FILM COATED ORAL at 21:22

## 2023-07-28 RX ADMIN — APIXABAN 5 MG: 5 TABLET, FILM COATED ORAL at 08:03

## 2023-07-28 RX ADMIN — TROSPIUM CHLORIDE 20 MG: 20 TABLET, FILM COATED ORAL at 16:04

## 2023-07-28 RX ADMIN — METOPROLOL SUCCINATE 100 MG: 50 TABLET, EXTENDED RELEASE ORAL at 08:03

## 2023-07-28 RX ADMIN — SOTALOL HYDROCHLORIDE 40 MG: 80 TABLET ORAL at 21:21

## 2023-07-28 RX ADMIN — Medication 10 ML: at 21:22

## 2023-07-28 RX ADMIN — PROPOFOL 10 MG: 10 INJECTION, EMULSION INTRAVENOUS at 11:47

## 2023-07-28 RX ADMIN — INSULIN GLARGINE 50 UNITS: 100 INJECTION, SOLUTION SUBCUTANEOUS at 21:22

## 2023-07-28 RX ADMIN — PROPOFOL 10 MG: 10 INJECTION, EMULSION INTRAVENOUS at 11:45

## 2023-07-28 RX ADMIN — PROPOFOL 10 MG: 10 INJECTION, EMULSION INTRAVENOUS at 11:43

## 2023-07-28 RX ADMIN — PROPOFOL 10 MG: 10 INJECTION, EMULSION INTRAVENOUS at 11:49

## 2023-07-28 RX ADMIN — METOPROLOL SUCCINATE 100 MG: 50 TABLET, EXTENDED RELEASE ORAL at 21:21

## 2023-07-28 RX ADMIN — SOTALOL HYDROCHLORIDE 40 MG: 80 TABLET ORAL at 08:04

## 2023-07-28 RX ADMIN — CYANOCOBALAMIN TAB 500 MCG 1000 MCG: 500 TAB at 08:03

## 2023-07-28 RX ADMIN — SODIUM CHLORIDE: 9 INJECTION, SOLUTION INTRAVENOUS at 11:23

## 2023-07-28 NOTE — ANESTHESIA PRE PROCEDURE
Department of Anesthesiology  Preprocedure Note       Name:  Hilda Avalos   Age:  82 y.o.  :  1935                                          MRN:  [id number]         Date:  2023      Surgeon: * Surgery not found *    Procedure:     Medications prior to admission:   Prior to Admission medications    Medication Sig Start Date End Date Taking?  Authorizing Provider   albuterol sulfate HFA (PROVENTIL;VENTOLIN;PROAIR) 108 (90 Base) MCG/ACT inhaler INHALE TWO PUFFS BY MOUTH EVERY 6 HOURS AS NEEDED FOR WHEEZING 23  Yes Historical Provider, MD   metoprolol succinate (TOPROL XL) 25 MG extended release tablet Take 1 tablet by mouth daily   Yes Historical Provider, MD   mirabegron (MYRBETRIQ) 50 MG TB24 Take 50 mg by mouth daily    Historical Provider, MD   insulin glargine (BASAGLAR KWIKPEN) 100 UNIT/ML injection pen Inject 27 Units into the skin nightly  Patient taking differently: Inject 27 Units into the skin nightly 32 Units in morning 34 units at night 23   [address], DO   furosemide (LASIX) 20 MG tablet Take 1 tablet by mouth daily  Patient taking differently: Take 1 tablet by mouth as needed For swelling [address], DO   magnesium oxide (MAG-OX) 400 (240 Mg) MG tablet Take 1 tablet by mouth daily 23   Cash Nolan, DO   potassium chloride (KLOR-CON M) 20 MEQ extended release tablet Take 1 tablet by mouth daily 23   Cash Nolan, DO   apixaban (ELIQUIS) 5 MG TABS tablet Take 1 tablet by mouth 2 times daily Changed to taking half a pill BID 23   Historical Provider, MD   diclofenac sodium (VOLTAREN) 1 % GEL Apply 2 g topically 2 times daily 22  Mer Smith, APRN - CNP   pravastatin (PRAVACHOL) 20 MG tablet  22   Historical Provider, MD   cyanocobalamin 1000 MCG tablet Take 1 tablet by mouth daily 22   Historical Provider, MD   vitamin D (CHOLECALCIFEROL) 25 MCG (1000 UT) TABS tablet Take 1 tablet by mouth daily    Historical Provider,

## 2023-07-28 NOTE — PROCEDURES
ELECTROPHYSIOLOGY REPORT    : Werner Ackerman MD    COMPLICATIONS:  none    ESTIMATED LOSS OF BLOOD: None    ANESTHESIA: MAC    HISTORY:  82 y.o. woman with symptomatic atrial fibrillation despite rate control measures. Presents for JULIENNE-guided cardioversion. DETAILS OF PROCEDURE:      The patient was in the pre-post area  in stable condition. The patient was in a fasting, nonsedated state. She was hemodynamically stable. The risks, benefits and alternatives of the procedure were discussed with the patient and family in detail. The risks including, but not limited to, the risks of stroke, asystole, skin burns were discussed with the patient. The patient considered her options and opted to proceed with JULIENNE-guided external electrical cardioversion as planned. Written informed consent was obtained and placed on the chart. At this time, a timeout protocol was completed to identify the patient and the procedure being performed. The patient was attached to continuous electrocardiographic monitoring with noninvasive blood pressure monitoring as well. Anterior and posterior chest defibrillation pads were attached in the typical position. A full trans-esophageal echocardiogram study was performed. Multi-plane imaging of the cardiac structures and chambers were obtained along with Doppler data. A full report is dictated separately. In brief, there were no significant cardiac abnormalities and there was no evidence of any left atrial/left atrial appendage thrombus. Once adequate sedation was confirmed, a 250 joule synchronized biphasic external shock was delivered and failed to convert the patient to normal sinus rhythm. Once adequate sedation was again confirmed, a 360 joule synchronized biphasic external shock was delivered and successfully cardioverted the patient to normal sinus rhythm. The patient maintained adequate oxygenation throughout the procedure. She remained hemodynamically stable.

## 2023-07-28 NOTE — PERIOP NOTE
CATH LAB PROCEDURE LOG - TRANSESOPHAGEAL ECHOCARDIOGRAM, POSSIBLE CARDIOVERSION    PRE Procedure:    ARRIVAL TIME: 0900    Pt arrived to Cath Lab. Plan of Care: Hemodynamics and cardiac rhythm will remain stable. Comfort level will be maintained. Respiratory function will remain adequate. Pt/family will verbalize understanding of the procedure. Procedure will be tolerated without complications. Patient will recover from procedure without complications. ID armband on patient and identification verified. Informed consent obtained. Non invasive blood pressure cuff applied, monitoring initiated. EKG pads and pulse oximeter applied, monitoring initiated. Instructions given. Patient and / or family verbalize understanding. H&P will be documented by physician in [address] [place] [address]. Pt has been NPO since midnight. Post DCCV EKG ordered? Yes    Defibrillator pads applied to chest.       Transesophageal Echocardiogram:    Timeout and fire safety completed. TIMEOUT TIME: 1124 (echo tech, MD, anesthesia all here, boyfrienlam Galeas sent to waiting room). CORRECT PATIENT VERIFIED. MEMBERS OF THE SURGICAL TEAM/VISITORS INTRODUCED. ALLERGIES ANNOUNCED. CORRECT PROCEDURE VERIFIED. CORRECT PROCEDURAL SITE VERIFIED. CONSENTS VERIFIED. IMPLANT EQUIPMENT, ADDITIONAL SERVICES, SPECIAL REQUIREMENTS AVAILABLE. MEDICATIONS LABELED AND AVAILABLE. APPROPRIATE PRE MEDS HAVE BEEN ADMINISTERED. FIRE SAFETY: ALCOHOL PREP SOLUTION HAD SUFFICIENT TIME TO DISSIPATE IF USED. FIRE SAFETY: SURGICAL SITE OR INCISION ABOVE THE XIPHOID. YES=1, NO=0.   FIRE SAFETY: OPEN OXYGEN SOURCE. YES=1, NO=0.   FIRE SAFETY: AVAILABLE IGNITION SOURCE. YES=1, NO=0.   FIRE SAFETY: SCORE TOTAL = 1. Anesthesia sedates patient, refer to anesthesia log. Sedation started @ 1146. JULIENNE advanced @ 1148. Images collected.      Patient continues to tolerate procedure well @ 1154    JULIENNE probe withdrawn @ 1154    Cardioversion: 1155 AM    Synchronized

## 2023-07-29 LAB
ANION GAP SERPL CALCULATED.3IONS-SCNC: 15 MMOL/L (ref 3–16)
BUN SERPL-MCNC: 31 MG/DL (ref 7–20)
CALCIUM SERPL-MCNC: 9 MG/DL (ref 8.3–10.6)
CHLORIDE SERPL-SCNC: 96 MMOL/L (ref 99–110)
CO2 SERPL-SCNC: 20 MMOL/L (ref 21–32)
CREAT SERPL-MCNC: 1.2 MG/DL (ref 0.6–1.2)
EKG ATRIAL RATE: 133 BPM
EKG ATRIAL RATE: 68 BPM
EKG DIAGNOSIS: NORMAL
EKG DIAGNOSIS: NORMAL
EKG P AXIS: 80 DEGREES
EKG P-R INTERVAL: 218 MS
EKG Q-T INTERVAL: 268 MS
EKG Q-T INTERVAL: 452 MS
EKG QRS DURATION: 84 MS
EKG QRS DURATION: 84 MS
EKG QTC CALCULATION (BAZETT): 395 MS
EKG QTC CALCULATION (BAZETT): 480 MS
EKG R AXIS: -11 DEGREES
EKG R AXIS: -6 DEGREES
EKG T AXIS: 106 DEGREES
EKG T AXIS: 174 DEGREES
EKG VENTRICULAR RATE: 131 BPM
EKG VENTRICULAR RATE: 68 BPM
GFR SERPLBLD CREATININE-BSD FMLA CKD-EPI: 44 ML/MIN/{1.73_M2}
GLUCOSE BLD-MCNC: 157 MG/DL (ref 70–99)
GLUCOSE BLD-MCNC: 174 MG/DL (ref 70–99)
GLUCOSE BLD-MCNC: 179 MG/DL (ref 70–99)
GLUCOSE BLD-MCNC: 90 MG/DL (ref 70–99)
GLUCOSE SERPL-MCNC: 90 MG/DL (ref 70–99)
PERFORMED ON: ABNORMAL
PERFORMED ON: NORMAL
POTASSIUM SERPL-SCNC: 3.8 MMOL/L (ref 3.5–5.1)
SODIUM SERPL-SCNC: 131 MMOL/L (ref 136–145)

## 2023-07-29 PROCEDURE — 99233 SBSQ HOSP IP/OBS HIGH 50: CPT | Performed by: INTERNAL MEDICINE

## 2023-07-29 PROCEDURE — 6370000000 HC RX 637 (ALT 250 FOR IP): Performed by: INTERNAL MEDICINE

## 2023-07-29 PROCEDURE — 93005 ELECTROCARDIOGRAM TRACING: CPT | Performed by: INTERNAL MEDICINE

## 2023-07-29 PROCEDURE — 93010 ELECTROCARDIOGRAM REPORT: CPT | Performed by: INTERNAL MEDICINE

## 2023-07-29 PROCEDURE — 6360000002 HC RX W HCPCS: Performed by: NURSE PRACTITIONER

## 2023-07-29 PROCEDURE — 2580000003 HC RX 258: Performed by: NURSE PRACTITIONER

## 2023-07-29 PROCEDURE — 6370000000 HC RX 637 (ALT 250 FOR IP): Performed by: NURSE PRACTITIONER

## 2023-07-29 PROCEDURE — 80048 BASIC METABOLIC PNL TOTAL CA: CPT

## 2023-07-29 PROCEDURE — 36415 COLL VENOUS BLD VENIPUNCTURE: CPT

## 2023-07-29 PROCEDURE — 2060000000 HC ICU INTERMEDIATE R&B

## 2023-07-29 RX ORDER — DILTIAZEM HYDROCHLORIDE 120 MG/1
120 CAPSULE, COATED, EXTENDED RELEASE ORAL DAILY
Status: DISCONTINUED | OUTPATIENT
Start: 2023-07-29 | End: 2023-07-31 | Stop reason: HOSPADM

## 2023-07-29 RX ORDER — MECOBALAMIN 5000 MCG
5 TABLET,DISINTEGRATING ORAL NIGHTLY
Status: DISCONTINUED | OUTPATIENT
Start: 2023-07-29 | End: 2023-07-31 | Stop reason: HOSPADM

## 2023-07-29 RX ADMIN — TROSPIUM CHLORIDE 20 MG: 20 TABLET, FILM COATED ORAL at 16:44

## 2023-07-29 RX ADMIN — PRAVASTATIN SODIUM 20 MG: 20 TABLET ORAL at 21:02

## 2023-07-29 RX ADMIN — Medication 5 MG: at 21:02

## 2023-07-29 RX ADMIN — METOPROLOL SUCCINATE 100 MG: 50 TABLET, EXTENDED RELEASE ORAL at 21:02

## 2023-07-29 RX ADMIN — DILTIAZEM HYDROCHLORIDE 120 MG: 120 CAPSULE, COATED, EXTENDED RELEASE ORAL at 10:55

## 2023-07-29 RX ADMIN — TROSPIUM CHLORIDE 20 MG: 20 TABLET, FILM COATED ORAL at 06:55

## 2023-07-29 RX ADMIN — PROCHLORPERAZINE EDISYLATE 10 MG: 5 INJECTION INTRAMUSCULAR; INTRAVENOUS at 14:42

## 2023-07-29 RX ADMIN — PANTOPRAZOLE SODIUM 40 MG: 40 TABLET, DELAYED RELEASE ORAL at 06:55

## 2023-07-29 RX ADMIN — APIXABAN 5 MG: 5 TABLET, FILM COATED ORAL at 09:04

## 2023-07-29 RX ADMIN — Medication 10 ML: at 21:03

## 2023-07-29 RX ADMIN — APIXABAN 5 MG: 5 TABLET, FILM COATED ORAL at 21:02

## 2023-07-29 RX ADMIN — INSULIN GLARGINE 50 UNITS: 100 INJECTION, SOLUTION SUBCUTANEOUS at 21:01

## 2023-07-29 RX ADMIN — Medication 10 ML: at 09:05

## 2023-07-29 RX ADMIN — DICLOFENAC SODIUM 2 G: 10 GEL TOPICAL at 21:02

## 2023-07-29 RX ADMIN — METOPROLOL SUCCINATE 100 MG: 50 TABLET, EXTENDED RELEASE ORAL at 09:04

## 2023-07-29 RX ADMIN — CYANOCOBALAMIN TAB 500 MCG 1000 MCG: 500 TAB at 09:04

## 2023-07-29 RX ADMIN — SOTALOL HYDROCHLORIDE 40 MG: 80 TABLET ORAL at 09:04

## 2023-07-29 ASSESSMENT — ENCOUNTER SYMPTOMS
SNORING: 1
SHORTNESS OF BREATH: 0
LEFT EYE: 0
WHEEZING: 0
HEMATEMESIS: 0
HEMATOCHEZIA: 0
SLEEP DISTURBANCES DUE TO BREATHING: 1
RIGHT EYE: 0
STRIDOR: 0

## 2023-07-30 LAB
BASE EXCESS BLDV CALC-SCNC: 1.1 MMOL/L (ref -3–3)
CO2 BLDV-SCNC: 26 MMOL/L
COHGB MFR BLDV: 2.2 % (ref 0–1.5)
GLUCOSE BLD-MCNC: 190 MG/DL (ref 70–99)
GLUCOSE BLD-MCNC: 204 MG/DL (ref 70–99)
GLUCOSE BLD-MCNC: 278 MG/DL (ref 70–99)
GLUCOSE BLD-MCNC: 93 MG/DL (ref 70–99)
HCO3 BLDV-SCNC: 24.8 MMOL/L (ref 23–29)
METHGB MFR BLDV: 0.6 %
O2 THERAPY: ABNORMAL
PCO2 BLDV: 35.8 MMHG (ref 40–50)
PERFORMED ON: ABNORMAL
PERFORMED ON: NORMAL
PH BLDV: 7.46 [PH] (ref 7.35–7.45)
PO2 BLDV: 74.9 MMHG (ref 25–40)
SAO2 % BLDV: 94 %

## 2023-07-30 PROCEDURE — 6370000000 HC RX 637 (ALT 250 FOR IP): Performed by: INTERNAL MEDICINE

## 2023-07-30 PROCEDURE — 2580000003 HC RX 258: Performed by: NURSE PRACTITIONER

## 2023-07-30 PROCEDURE — 99233 SBSQ HOSP IP/OBS HIGH 50: CPT | Performed by: INTERNAL MEDICINE

## 2023-07-30 PROCEDURE — 6370000000 HC RX 637 (ALT 250 FOR IP): Performed by: NURSE PRACTITIONER

## 2023-07-30 PROCEDURE — 2060000000 HC ICU INTERMEDIATE R&B

## 2023-07-30 PROCEDURE — 82803 BLOOD GASES ANY COMBINATION: CPT

## 2023-07-30 PROCEDURE — 5A09357 ASSISTANCE WITH RESPIRATORY VENTILATION, LESS THAN 24 CONSECUTIVE HOURS, CONTINUOUS POSITIVE AIRWAY PRESSURE: ICD-10-PCS | Performed by: INTERNAL MEDICINE

## 2023-07-30 RX ADMIN — Medication 10 ML: at 22:01

## 2023-07-30 RX ADMIN — INSULIN GLARGINE 50 UNITS: 100 INJECTION, SOLUTION SUBCUTANEOUS at 21:59

## 2023-07-30 RX ADMIN — TROSPIUM CHLORIDE 20 MG: 20 TABLET, FILM COATED ORAL at 06:24

## 2023-07-30 RX ADMIN — INSULIN LISPRO 2 UNITS: 100 INJECTION, SOLUTION INTRAVENOUS; SUBCUTANEOUS at 12:25

## 2023-07-30 RX ADMIN — METOPROLOL SUCCINATE 100 MG: 50 TABLET, EXTENDED RELEASE ORAL at 08:37

## 2023-07-30 RX ADMIN — Medication 5 MG: at 21:58

## 2023-07-30 RX ADMIN — METOPROLOL SUCCINATE 100 MG: 50 TABLET, EXTENDED RELEASE ORAL at 21:58

## 2023-07-30 RX ADMIN — APIXABAN 5 MG: 5 TABLET, FILM COATED ORAL at 21:58

## 2023-07-30 RX ADMIN — Medication 10 ML: at 08:38

## 2023-07-30 RX ADMIN — CYANOCOBALAMIN TAB 500 MCG 1000 MCG: 500 TAB at 08:37

## 2023-07-30 RX ADMIN — APIXABAN 5 MG: 5 TABLET, FILM COATED ORAL at 08:37

## 2023-07-30 RX ADMIN — PRAVASTATIN SODIUM 20 MG: 20 TABLET ORAL at 21:58

## 2023-07-30 RX ADMIN — PANTOPRAZOLE SODIUM 40 MG: 40 TABLET, DELAYED RELEASE ORAL at 06:24

## 2023-07-30 RX ADMIN — DILTIAZEM HYDROCHLORIDE 120 MG: 120 CAPSULE, COATED, EXTENDED RELEASE ORAL at 08:37

## 2023-07-30 RX ADMIN — TROSPIUM CHLORIDE 20 MG: 20 TABLET, FILM COATED ORAL at 16:51

## 2023-07-30 ASSESSMENT — ENCOUNTER SYMPTOMS
RIGHT EYE: 0
HEMATOCHEZIA: 0
SLEEP DISTURBANCES DUE TO BREATHING: 1
STRIDOR: 0
HEMATEMESIS: 0
SHORTNESS OF BREATH: 0
LEFT EYE: 0
WHEEZING: 0
SNORING: 1

## 2023-07-31 VITALS
RESPIRATION RATE: 18 BRPM | SYSTOLIC BLOOD PRESSURE: 134 MMHG | WEIGHT: 237.88 LBS | HEIGHT: 62 IN | TEMPERATURE: 97.5 F | BODY MASS INDEX: 43.77 KG/M2 | DIASTOLIC BLOOD PRESSURE: 82 MMHG | HEART RATE: 68 BPM | OXYGEN SATURATION: 99 %

## 2023-07-31 LAB
GLUCOSE BLD-MCNC: 101 MG/DL (ref 70–99)
GLUCOSE BLD-MCNC: 245 MG/DL (ref 70–99)
PERFORMED ON: ABNORMAL
PERFORMED ON: ABNORMAL

## 2023-07-31 PROCEDURE — 6370000000 HC RX 637 (ALT 250 FOR IP): Performed by: INTERNAL MEDICINE

## 2023-07-31 PROCEDURE — 2580000003 HC RX 258: Performed by: NURSE PRACTITIONER

## 2023-07-31 PROCEDURE — 6370000000 HC RX 637 (ALT 250 FOR IP): Performed by: NURSE PRACTITIONER

## 2023-07-31 RX ORDER — DILTIAZEM HYDROCHLORIDE 120 MG/1
120 CAPSULE, COATED, EXTENDED RELEASE ORAL DAILY
Qty: 30 CAPSULE | Refills: 3 | Status: SHIPPED | OUTPATIENT
Start: 2023-08-01

## 2023-07-31 RX ORDER — METOPROLOL SUCCINATE 100 MG/1
100 TABLET, EXTENDED RELEASE ORAL 2 TIMES DAILY
Qty: 60 TABLET | Refills: 3 | Status: SHIPPED | OUTPATIENT
Start: 2023-07-31

## 2023-07-31 RX ORDER — INSULIN GLARGINE 100 [IU]/ML
40 INJECTION, SOLUTION SUBCUTANEOUS NIGHTLY
Qty: 10 ML | Refills: 3 | Status: SHIPPED | OUTPATIENT
Start: 2023-07-31

## 2023-07-31 RX ORDER — TROSPIUM CHLORIDE 20 MG/1
20 TABLET, FILM COATED ORAL
Qty: 60 TABLET | Refills: 3 | Status: SHIPPED | OUTPATIENT
Start: 2023-07-31

## 2023-07-31 RX ORDER — FUROSEMIDE 20 MG/1
20 TABLET ORAL AS NEEDED
Qty: 30 TABLET | Refills: 1 | Status: SHIPPED | OUTPATIENT
Start: 2023-07-31

## 2023-07-31 RX ADMIN — APIXABAN 5 MG: 5 TABLET, FILM COATED ORAL at 08:28

## 2023-07-31 RX ADMIN — Medication 10 ML: at 08:29

## 2023-07-31 RX ADMIN — PANTOPRAZOLE SODIUM 40 MG: 40 TABLET, DELAYED RELEASE ORAL at 06:38

## 2023-07-31 RX ADMIN — TROSPIUM CHLORIDE 20 MG: 20 TABLET, FILM COATED ORAL at 06:38

## 2023-07-31 RX ADMIN — METOPROLOL SUCCINATE 100 MG: 50 TABLET, EXTENDED RELEASE ORAL at 08:28

## 2023-07-31 RX ADMIN — DILTIAZEM HYDROCHLORIDE 120 MG: 120 CAPSULE, COATED, EXTENDED RELEASE ORAL at 08:28

## 2023-07-31 RX ADMIN — CYANOCOBALAMIN TAB 500 MCG 1000 MCG: 500 TAB at 08:28

## 2023-07-31 NOTE — DISCHARGE SUMMARY
Discharge Summary    Name:  Bere Ni /Age/Sex: 1935 (82 y.o. female)   Admit Date: 2023  Discharge Date: 23   MRN & CSN:  [id number] & [id number] Encounter Date and Time 23 9:49 AM EDT    Attending:  Jareth Beverly MD Discharging Provider: Jareth Beverly MD       Hospital Course: The patient is a pleasant 82 Y F with a h/o severe obesity, HTN, HLD, DM2, cardiomyopathy with EF 40% as of 2023 but then 50-55% as of 2023, and breast cancer s/p R lumpectomy and radiation. She was admitted to [facility] in 2023 with CHF and RVR. They discharged her with metoprolol succinate 100 BID. The subsequent  PCP notes list her metoprolol as 25 qd for some reason. The patient called the cardiology office a month ago to clarify. At that point she thought she was taking 50 BID, and they told her to take the 100 BID as recommended by her cardiologist.  Now, for some reason, the patient tells us that she has been taking 25 BID. In actuality I don't think she knows what she has been taking. She keeps changing her story. She now presents with dyspnea and was found to be in RVR. Hospital medicine admitted the patient overnight. Afib with RVR. Restarted her metoprolol 100 BID. She was discharged from [address] [place] [address] in 2022 with digoxin 62.5 qd, but her cardiologist stopped this for bradycardia at the subsequent clinic visit (HR was 56). We were initially unable to wean off dilt gtt here, cardiology consulted. Cardiology cardioverted her after a negative JULIENNE on , but she was back to Afib within 24 hours. Cardiology started sotalol, then stopped this when she had issues with QTc prolongation. Cardiology started PO dilt. Continue apixaban (increased her dose to 5 BID). TSH recently normal.  Replaced electrolytes. Chronic diastolic CHF.  discharged her with furosemide 20 mg po qd last time, then her PCP changed this to 20 po qd PRN.   Mild FLORES here with daily

## 2023-07-31 NOTE — DISCHARGE INSTR - COC
Continuity of Care Form    Patient Name: Olivia Troncoso   :  1935  MRN:  [id number]    Admit date:  2023  Discharge date:  23      Code Status Order: Limited   Advance Directives:     Admitting Physician:  Cammy Pizano MD  PCP: Padmini Jacob MD    Discharging Nurse: Charlene Ortiz RN  [facility] Unit/Room#: 0103/0103-01  Discharging Unit Phone Number:     Emergency Contact:   Extended Emergency Contact Information  Primary Emergency Contact: [address] [place] [address] [place] Phone: [phone]  Mobile Phone: [phone]  Relation: Other  Secondary Emergency Contact: [address] Phone: [phone]  Relation: None    Past Surgical History:  Past Surgical History:   Procedure Laterality Date    BREAST LUMPECTOMY      right    COLONOSCOPY  2015    HYSTERECTOMY (CERVIX STATUS UNKNOWN)      LITHOTRIPSY      NERVE BLOCK Right 9/15/2022    RIGHT SACROILIAC JOINT INJECTION SITE CONFIRMED BY FLUOROSCOPY performed by Atul Ramey MD at [address] [place] [address] Right 2022    RIGHT LUMBAR FOUR FIVE EPIDURAL STEROID INJECTION SITE CONFIRMED BY FLUOROSCOPY performed by Atul Ramey MD at [address] [place] [address] Right 2022    RIGHT LUMBAR FOUR FIVE EPIDURAL STEROID INJECTION SITE CONFIRMED BY FLUOROSCOPY performed by Atul Ramey MD at [address] [place] [address] Right 2022    RIGHT LUMBAR THREE LUMBAR FOUR LUMBAR FIVE MEDIAL BRANCH BLOCK SITE CONFIRMED BY FLUOROSCOPY performed by Atul Ramey MD at [facility]    RECTAL SURGERY         Immunization History:   Immunization History   Administered Date(s) Administered    COVID-19, J&J, (age 18y+), IM, 0.5 mL 2021    COVID-19, PFIZER Bivalent, DO NOT Dilute, (age 12y+), IM, 30 mcg/0.3 mL 10/26/2022       Active Problems:  Patient Active Problem List   Diagnosis Code    Lumbar radiculopathy M54.16    Lumbar spondylosis

## 2023-07-31 NOTE — CARE COORDINATION
[facility]    Referral received from  to follow for home care services. I will follow for needs, and speak with patient to verify demos.     [facility] unable to staff timely    Referral sent to [facility]; accepted by main intake line; will pull referral from epic    [place],  [facility] [phone]

## 2023-07-31 NOTE — PLAN OF CARE
Increase function to baseline.
Increase patients ADLs/functional status to baseline.
Problem: Discharge Planning  Goal: Discharge to home or other facility with appropriate resources  7/28/2023 0622 by Elizabeth Augustin RN  Outcome: Progressing  7/28/2023 0345 by Elizabeth Augustin RN  Outcome: Progressing     Problem: Pain  Goal: Verbalizes/displays adequate comfort level or baseline comfort level  Outcome: Progressing     Problem: ABCDS Injury Assessment  Goal: Absence of physical injury  Outcome: Progressing     Problem: Safety - Adult  Goal: Free from fall injury  7/28/2023 0622 by Elizabeth Augustin RN  Outcome: Progressing  7/28/2023 0345 by Elizabeth Augustin RN  Outcome: Progressing     Problem: Chronic Conditions and Co-morbidities  Goal: Patient's chronic conditions and co-morbidity symptoms are monitored and maintained or improved  7/28/2023 0622 by Elizabeth Augustin RN  Outcome: Progressing  7/28/2023 0345 by Elizabeth Augustin RN  Outcome: Progressing     Patient's EF (Ejection Fraction) is greater than 40%    Heart Failure Medications:  Diuretics[de-identified] Furosemide    (One of the following REQUIRED for EF </= 40%/SYSTOLIC FAILURE but MAY be used in EF% >40%/DIASTOLIC FAILURE)        ACE[de-identified] None        ARB[de-identified] None         ARNI[de-identified] None    (Beta Blockers)  NON- Evidenced Based Beta Blocker (for EF% >40%/DIASTOLIC FAILURE): None    Evidenced Based Beta Blocker::(REQUIRED for EF% <40%/SYSTOLIC FAILURE) Metoprolol SUCCinate- Toprol XL  . ..................................................................................................................................................     Healthy Weight Tracking - BMI + Meds 5/30/2023 5/31/2023 6/1/2023 7/25/2023 7/25/2023 7/26/2023 7/28/2023   Weight 248 lb 4.8 oz 243 lb 9.6 oz 243 lb 11.2 oz 250 lb 243 lb 9.7 oz 243 lb 9.7 oz 247 lb   Height - - - 5' 2\" 5' 2\" - -   Body Mass Index 46.92 kg/m2 46.03 kg/m2 46.05 kg/m2 45.73 kg/m2 44.56 kg/m2 44.56 kg/m2 45.18 kg/m2   Some recent data might be hidden         Patient's weights and intake/output reviewed: Yes    Daily Weight
Problem: Discharge Planning  Goal: Discharge to home or other facility with appropriate resources  7/28/2023 1741 by Juancarlos Angulo RN  Outcome: Progressing     Problem: Pain  Goal: Verbalizes/displays adequate comfort level or baseline comfort level  7/28/2023 1741 by Juancarlos Angulo RN  Outcome: Progressing     Problem: Safety - Adult  Goal: Free from fall injury  7/28/2023 1741 by Juancarlos Angulo RN  Outcome: Progressing     Problem: Chronic Conditions and Co-morbidities  Goal: Patient's chronic conditions and co-morbidity symptoms are monitored and maintained or improved  7/28/2023 1741 by Juancarlos Angulo RN  Outcome: Progressing     Patient's EF (Ejection Fraction) is 40%    Heart Failure Medications:  Diuretics[de-identified] None    (One of the following REQUIRED for EF </= 40%/SYSTOLIC FAILURE but MAY be used in EF% >40%/DIASTOLIC FAILURE)        ACE[de-identified] None        ARB[de-identified] None         ARNI[de-identified] None    (Beta Blockers)  NON- Evidenced Based Beta Blocker (for EF% >40%/DIASTOLIC FAILURE): Sotalol-Betapace    Evidenced Based Beta Blocker::(REQUIRED for EF% <40%/SYSTOLIC FAILURE) Metoprolol SUCCinate- Toprol XL  . .................................................................................................................................................. Healthy Weight Tracking - BMI + Meds 5/30/2023 5/31/2023 6/1/2023 7/25/2023 7/25/2023 7/26/2023 7/28/2023   Weight 248 lb 4.8 oz 243 lb 9.6 oz 243 lb 11.2 oz 250 lb 243 lb 9.7 oz 243 lb 9.7 oz 247 lb   Height - - - 5' 2\" 5' 2\" - -   Body Mass Index 46.92 kg/m2 46.03 kg/m2 46.05 kg/m2 45.73 kg/m2 44.56 kg/m2 44.56 kg/m2 45.18 kg/m2   Some recent data might be hidden         Patient's weights and intake/output reviewed: Yes    Daily Weight log at bedside and being used: \"yes    Patient's Last Weight: 247 lbs obtained by standing scale. Difference of 4 lbs more than last documented weight.       Intake/Output Summary (Last 24 hours) at 7/28/2023 9724  Last data filed
Problem: Discharge Planning  Goal: Discharge to home or other facility with appropriate resources  7/29/2023 0600 by Okey Dandy, RN  Outcome: Progressing     Problem: Pain  Goal: Verbalizes/displays adequate comfort level or baseline comfort level  7/29/2023 0600 by Okey Dandy, RN  Outcome: Progressing     Problem: Chronic Conditions and Co-morbidities  Goal: Patient's chronic conditions and co-morbidity symptoms are monitored and maintained or improved  7/29/2023 0600 by Okey Dandy, RN  Outcome: Progressing       Patient's EF (Ejection Fraction) is 40%    Heart Failure Medications:  Diuretics[de-identified] None    (One of the following REQUIRED for EF </= 40%/SYSTOLIC FAILURE but MAY be used in EF% >40%/DIASTOLIC FAILURE)        ACE[de-identified] None        ARB[de-identified] None         ARNI[de-identified] None    (Beta Blockers)  NON- Evidenced Based Beta Blocker (for EF% >40%/DIASTOLIC FAILURE): Sotalol-Betapace    Evidenced Based Beta Blocker::(REQUIRED for EF% <40%/SYSTOLIC FAILURE) Metoprolol SUCCinate- Toprol XL  . .................................................................................................................................................. Healthy Weight Tracking - BMI + Meds 5/30/2023 5/31/2023 6/1/2023 7/25/2023 7/25/2023 7/26/2023 7/28/2023   Weight 248 lb 4.8 oz 243 lb 9.6 oz 243 lb 11.2 oz 250 lb 243 lb 9.7 oz 243 lb 9.7 oz 247 lb   Height - - - 5' 2\" 5' 2\" - -   Body Mass Index 46.92 kg/m2 46.03 kg/m2 46.05 kg/m2 45.73 kg/m2 44.56 kg/m2 44.56 kg/m2 45.18 kg/m2   Some recent data might be hidden         Patient's weights and intake/output reviewed: Yes    Daily Weight log at bedside and being used: \"yes    Patient's Last Weight: 247 lbs obtained by standing scale. Difference of 4 lbs more than last documented weight.       Intake/Output Summary (Last 24 hours) at 7/29/2023 0601  Last data filed at 7/28/2023 1857  Gross per 24 hour   Intake 100 ml   Output 500 ml   Net -400 ml       Education Booklet Provided:
Problem: Discharge Planning  Goal: Discharge to home or other facility with appropriate resources  Outcome: Progressing     Problem: ABCDS Injury Assessment  Goal: Absence of physical injury  Outcome: Progressing     Problem: Safety - Adult  Goal: Free from fall injury  Outcome: Progressing        Problem: Chronic Conditions and Co-morbidities  Goal: Patient's chronic conditions and co-morbidity symptoms are monitored and maintained or improved  Outcome: Progressing
Problem: Discharge Planning  Goal: Discharge to home or other facility with appropriate resources  Outcome: Progressing  Flowsheets (Taken 7/27/2023 0902)  Discharge to home or other facility with appropriate resources:   Identify barriers to discharge with patient and caregiver   Arrange for needed discharge resources and transportation as appropriate   Identify discharge learning needs (meds, wound care, etc)     Problem: Pain  Goal: Verbalizes/displays adequate comfort level or baseline comfort level  Outcome: Progressing  Flowsheets (Taken 7/27/2023 0855)  Verbalizes/displays adequate comfort level or baseline comfort level:   Encourage patient to monitor pain and request assistance   Assess pain using appropriate pain scale   Administer analgesics based on type and severity of pain and evaluate response   Implement non-pharmacological measures as appropriate and evaluate response     Problem: ABCDS Injury Assessment  Goal: Absence of physical injury  Outcome: Progressing     Problem: Safety - Adult  Goal: Free from fall injury  Outcome: Progressing     Problem: Chronic Conditions and Co-morbidities  Goal: Patient's chronic conditions and co-morbidity symptoms are monitored and maintained or improved  Outcome: Progressing  Flowsheets (Taken 7/27/2023 0902)  Care Plan - Patient's Chronic Conditions and Co-Morbidity Symptoms are Monitored and Maintained or Improved:   Monitor and assess patient's chronic conditions and comorbid symptoms for stability, deterioration, or improvement   Collaborate with multidisciplinary team to address chronic and comorbid conditions and prevent exacerbation or deterioration   Update acute care plan with appropriate goals if chronic or comorbid symptoms are exacerbated and prevent overall improvement and discharge
Problem: Pain  Goal: Verbalizes/displays adequate comfort level or baseline comfort level  7/29/2023 1147 by Adam Cline RN  Outcome: Progressing  Note: Pt will be satisfied with pain control. Pt uses numeric pain rating scale with reassessments after pain med administration. Will continue to monitor progression throughout shift. Problem: Safety - Adult  Goal: Free from fall injury  Outcome: Progressing  Note: Pt high fall risk. Instructed to use call light before getting out of bed. Call light within reach. Bed in low position. Bed alarm on. Will continue to monitor. Problem: Chronic Conditions and Co-morbidities  Goal: Patient's chronic conditions and co-morbidity symptoms are monitored and maintained or improved  7/29/2023 1147 by Adam Cline RN  Outcome: Progressing  Note: Pt will have accuchecks before meals and at bedtime with sliding scale insulin in place for coverage. Will continue to monitor for signs and symptoms of hypoglycemia and hyperglycemia throughout shift.
Problem: Pain  Goal: Verbalizes/displays adequate comfort level or baseline comfort level  Outcome: Progressing     Problem: Safety - Adult  Goal: Free from fall injury  Outcome: Progressing     Problem: Skin/Tissue Integrity  Goal: Absence of new skin breakdown  Description: 1. Monitor for areas of redness and/or skin breakdown  2. Assess vascular access sites hourly  3. Every 4-6 hours minimum:  Change oxygen saturation probe site  4. Every 4-6 hours:  If on nasal continuous positive airway pressure, respiratory therapy assess nares and determine need for appliance change or resting period. 7/30/2023 0936 by Kris Umaña RN  Outcome: Progressing       Patient's EF (Ejection Fraction) is greater than 40%    Heart Failure Medications:  Diuretics[de-identified] None    (One of the following REQUIRED for EF </= 40%/SYSTOLIC FAILURE but MAY be used in EF% >40%/DIASTOLIC FAILURE)        ACE[de-identified] None        ARB[de-identified] None         ARNI[de-identified] None    (Beta Blockers)  NON- Evidenced Based Beta Blocker (for EF% >40%/DIASTOLIC FAILURE): None    Evidenced Based Beta Blocker::(REQUIRED for EF% <40%/SYSTOLIC FAILURE) Metoprolol SUCCinate- Toprol XL  . .................................................................................................................................................. Healthy Weight Tracking - BMI + Meds 5/31/2023 6/1/2023 7/25/2023 7/25/2023 7/26/2023 7/28/2023 7/29/2023   Weight 243 lb 9.6 oz 243 lb 11.2 oz 250 lb 243 lb 9.7 oz 243 lb 9.7 oz 247 lb 243 lb 6.4 oz   Height - - 5' 2\" 5' 2\" - - -   Body Mass Index 46.03 kg/m2 46.05 kg/m2 45.73 kg/m2 44.56 kg/m2 44.56 kg/m2 45.18 kg/m2 44.52 kg/m2   Some recent data might be hidden         Patient's weights and intake/output reviewed: Yes    Daily Weight log at bedside and being used: no    Patient's Last Weight: 243 lbs obtained by standing scale. Difference of 4 lbs less than last documented weight.       Intake/Output Summary (Last 24 hours) at 7/30/2023
Mobilized today?: Edge of Bed, Bedside Commode, and Up in Room, ambulated 20 ft                 With Whom? Nurse and PCA                 Level of Difficulty/Assistance: 1x Assist     Pt resting in bed at this time on CPAP. Pt denies shortness of breath. Pt with nonpitting lower extremity edema.      Patient and/or Family's stated Goal of Care this Admission: reduce shortness of breath, increase activity tolerance, better understand heart failure and disease management, be more comfortable, and reduce lower extremity edema prior to discharge        :
less than last documented weight. Intake/Output Summary (Last 24 hours) at 7/30/2023 0417  Last data filed at 7/29/2023 1839  Gross per 24 hour   Intake 750 ml   Output 850 ml   Net -100 ml       Education Booklet Provided: yes    Comorbidities Reviewed Yes    Patient has a past medical history of Breast cancer ([address]), Cancer ([address]), CHF (congestive heart failure) ([address]), Diabetes mellitus ([address]), GERD (gastroesophageal reflux disease), History of therapeutic radiation, Hyperlipidemia, Hypertension, Influenza A, and Kidney stone. >>For CHF and Comorbidity documentation on Education Time and Topics, please see Education Tab    Progressive Mobility Assessment:  What is this patient's Current Level of Mobility?: Ambulatory- with Assistance  How was this patient Mobilized today?: Edge of Bed, Bedside Commode, and Up in Room, ambulated 10 ft                 With Whom? Nurse and PCA                 Level of Difficulty/Assistance: 1x Assist     Pt resting in bed at this time on CPAP. Pt with complaints of shortness of breath. Pt with pitting lower extremity edema.      Patient and/or Family's stated Goal of Care this Admission: reduce shortness of breath, increase activity tolerance, better understand heart failure and disease management, be more comfortable, and reduce lower extremity edema prior to discharge        :
standing scale. Difference of 4 lbs less than last documented weight. Intake/Output Summary (Last 24 hours) at 7/29/2023 1504  Last data filed at 7/29/2023 1057  Gross per 24 hour   Intake 360 ml   Output 850 ml   Net -490 ml       Education Booklet Provided: yes    Comorbidities Reviewed Yes    Patient has a past medical history of Breast cancer ([address]), Cancer ([address]), CHF (congestive heart failure) ([address]), Diabetes mellitus ([address]), GERD (gastroesophageal reflux disease), History of therapeutic radiation, Hyperlipidemia, Hypertension, Influenza A, and Kidney stone. >>For CHF and Comorbidity documentation on Education Time and Topics, please see Education Tab    Progressive Mobility Assessment:  What is this patient's Current Level of Mobility?: Ambulatory- with Assistance  How was this patient Mobilized today?: Edge of Bed, Up to Chair, Bedside Commode, and Up in Room, ambulated 10 ft                 With Whom? Nurse and PCA                 Level of Difficulty/Assistance: 1x Assist     Pt resting in bed at this time on room air. Pt denies shortness of breath. Pt with nonpitting lower extremity edema.      Patient and/or Family's stated Goal of Care this Admission: reduce shortness of breath, increase activity tolerance, better understand heart failure and disease management, be more comfortable, and reduce lower extremity edema prior to discharge        :

## 2023-07-31 NOTE — CARE COORDINATION
CASE MANAGEMENT DISCHARGE SUMMARY      Discharge to: home alone with [facility], denies other needs    Precertification completed: NA  Hospital Exemption Notification (HENS) completed: NA    IMM given: 7/31/23    New Durable Medical Equipment ordered/agency: NA    Transportation:    Family/car: family       Confirmed discharge plan with:     Patient: yes     Family:  yes, per patient     Facility/Agency, name:  [facility] to obtain from [employer]        RN, name: Mya Love    Note: Discharging nurse to complete [facility], reconcile [facility], and place final copy with patient's discharge packet. RN to ensure that written prescriptions for  Level II medications are sent with patient to the facility as per protocol.

## 2023-07-31 NOTE — PROGRESS NOTES
07/26/23 0219   NIV Type   NIV Started/Stopped On   Equipment Type v60   Mode CPAP   Mask Type Full face mask   Mask Size Medium   Assessment   Respirations 18   Comfort Level Good   Using Accessory Muscles No   Mask Compliance Good   Skin Assessment Clean, dry, & intact   Skin Protection for O2 Device Yes   Location Nose   Settings/Measurements   PIP Observed 9 cm H20  (ramp applied for comfort)   CPAP/EPAP 12 cmH2O   Vt (Measured) 730 mL   FiO2  21 %   Minute Volume (L/min) 12 Liters   Mask Leak (lpm) 10 lpm   Patient's Home Machine No   Alarm Settings   Alarms On Y   Low Pressure (cmH2O) 6 cmH2O   High Pressure (cmH2O) 30 cmH2O   Delay Alarm 20 sec(s)   RR Low (bpm) 6   RR High (bpm) 40 br/min
Call initiated by: Nursing staff:  Call addressed around: 7/27/2023 1:32 AM      Reason for call: Patient restless and anxious       Screening labs   Ativan 0.5mg IV x 1 dose. Bedside Evaluation     S: Patient states feels anxious. Denies previous similar episodes. Some SOB, denies CP  Per nursing very restless and emotionally labile. Some urinary frequency reported per staff up to bathroom. Right hip and ankle pain   Non pharmacological measures by nursing ineffective   O:   Gen:  awake, alert, cooperative, no apparent distress obese body habitus  LUNGS: Normal Effort/ symmetry movement   CARDIOVASCULAR:  IRRR  ABDOMEN: Non tender, non distended,   MUSCULOSKELETAL: no gross deformities  NEUROLOGIC: Alert and Oriented,  Cranial nerves II-XII are grossly intact. SKIN:  no bruising or bleeding, normal skin color,  no redness  PSYCH: A/Ox 3. Anxious affect. Tearful    A/P   Anxiety    Screening labs -monitor electrolytes    Pending UA   Ativan 0.5mg IV as trial as compazine, vistaril ineffective.          Stefano Goncalves, NGUYEN - CNP
Cardiac Electrophysiology Progress Note      Physician requesting consult: Kathie Lott MD   Reason for Seeing patient: atrial fibrillation   Admission Date: 7/25/2023  Room/Bed:  [facility]    Assessment:     1. Atrial fibrillation: patient has persistent atrial fibrillation. Associated symptoms: Dizziness, Dyspnea on exertion, and Shortness of breath     History of cardioversion: No prior history of cardioversion  History of AF ablation: No history of atrial fibrillation ablation in the past  History of heart surgery/procedure: yes, coronary angiography in 2021    Current use of anti-arrhythmic drugs: Not currently on anti-arrhythmic drugs  Previous use of anti-arrhythmic drugs: Not previously on any anti-arrhythmic drugs    Overall LV function: Moderate left ventricular systolic dysfunction  (LVEF 40%)  Size of left atrium: Severely dilated LA size  Significant cardiac valvular disease: No significant valve disease  Family history of atrial fibrillation: No    Alcohol consumption: No alcohol consumption  Caffeine consumption: Mild caffeine intake  Smoking status: former smoker, quit > 30 years ago  Obstructive sleep apnea: On CPAP/BiPAP therapy  Exercise status: Does not exercise regularly (sedentary lifestyle)    I have had discussions with the patient about issues related to atrial fibrillation (including etiology, disease progression patterns, stroke risk and rate control issues). Patient had her questions answered to her satisfaction.       Patient has a [id number] score of at least 6 [Age over 75 (2 points), Diabetes Mellitus (1 point), Female gender (1 point), Heart failure (1 point), and Hypertension (1 point)]  Longterm anticoagulation is: recommended  Current anticoagulation: Apixaban  Bleeding issues reported: no  Renal function: Abnormal renal function; acute on chronic renal failure  Thyroid function: Normal    Patient presented with what appeared to be persistent atrial fibrillation since
Hospital Medicine Progress Note        Subjective:  She is still dyspneic, especially with exertion, denies orthopnea. HR still consistently uncontrolled. Delirium overnight, required sedatives. General appearance: No apparent distress, appears stated age. HEENT: Pupils equal, round. Conjunctivae/corneas clear. Neck: No jugular venous distention. Respiratory:  Normal respiratory effort. Bilaterally without Rales/Wheezes/Rhonchi. Cardiovascular: Normal rate and irregular rhythm with normal S1/S2 without murmurs, rubs or gallops. Abdomen: Soft, non-tender, non-distended with normal bowel sounds. Musculoskeletal: No cyanosis bilaterally. No edema. Without deformity. Skin: No jaundice. No rashes or lesions. Neurologic:  Neurovascularly intact without any focal sensory/motor deficits. Cranial nerves: II-XII intact, grossly non-focal.  Psychiatric: oriented to person, place, and time, yet has very limited insight and poor memory. Impulsive yet drowsy. Assessment and Plan:       The patient is a pleasant 82 Y F with a h/o severe obesity, HTN, HLD, DM2, cardiomyopathy with EF 40% as of 5/2023 but then 50-55% as of 6/2023, and breast cancer s/p R lumpectomy and radiation. She was admitted to [facility] in 5/2023 with CHF and RVR. They discharged her with metoprolol succinate 100 BID. The subsequent  PCP notes list her metoprolol as 25 qd for some reason. The patient called the cardiology office a month ago to clarify. At that point she thought she was taking 50 BID, and they told her to take the 100 BID as recommended by her cardiologist.  Now, for some reason, the patient tells us that she has been taking 25 BID. In actuality I don't think she knows what she has been taking. She keeps changing her story. She now presents with dyspnea and was found to be in RVR. Hospital medicine admitted the patient overnight. Afib with RVR. Restarted her metoprolol 100 BID.   She was discharged
Hospital Medicine Progress Note        Subjective:  She seems to be breathing more easily today. Exhausted. Back in Afib. General appearance: No apparent distress, appears stated age. HEENT: Pupils equal, round. Conjunctivae/corneas clear. Neck: No jugular venous distention. Respiratory:  Normal respiratory effort. Bilaterally without Rales/Wheezes/Rhonchi. Cardiovascular: Normal rate and irregular rhythm with normal S1/S2 without murmurs, rubs or gallops. Abdomen: Soft, non-tender, non-distended with normal bowel sounds. Musculoskeletal: No cyanosis bilaterally. No edema. Without deformity. Skin: No jaundice. No rashes or lesions. Neurologic:  Neurovascularly intact without any focal sensory/motor deficits. Cranial nerves: II-XII intact, grossly non-focal.  Psychiatric: oriented to person, place, and time, yet has has limited insight and poor memory. Exhausted, hardly moves her lips bilaterally when she speaks, eyelids droopy. Assessment and Plan:       The patient is a pleasant 82 Y F with a h/o severe obesity, HTN, HLD, DM2, cardiomyopathy with EF 40% as of 5/2023 but then 50-55% as of 6/2023, and breast cancer s/p R lumpectomy and radiation. She was admitted to [facility] in 5/2023 with CHF and RVR. They discharged her with metoprolol succinate 100 BID. The subsequent  PCP notes list her metoprolol as 25 qd for some reason. The patient called the cardiology office a month ago to clarify. At that point she thought she was taking 50 BID, and they told her to take the 100 BID as recommended by her cardiologist.  Now, for some reason, the patient tells us that she has been taking 25 BID. In actuality I don't think she knows what she has been taking. She keeps changing her story. She now presents with dyspnea and was found to be in RVR. Hospital medicine admitted the patient overnight. Afib with RVR. Restarted her metoprolol 100 BID.   She was discharged from [address] [place] [address] in 5/2022
Patient admitted to room 202 from ED. Tele box in place and CMU notified. Patient oriented to room, call light, bed rails, phone, lights and bathroom. Patient instructed about the schedule of the day including: vital sign frequency, lab draws, possible tests, frequency of MD and staff rounds, including RN/MD rounding together at bedside, daily weights, and I &O's. Patient instructed about prescribed diet, how to order food, and television. Bed locked, in lowest position, side rails up 2/4, call light within reach. Will continue to monitor.
Patient answers questions appropriately, but intermittent confusion/forgetfulness. Updated patient's daughter Donald Moreno about procedure today (CV/JULIENNE) prior to starting. She verbalizes understanding/agreeable with patient to have this done, have anesthesia, and put DNR on hold during the procedure. 2nd RN here for witness. Donald Moreno discussed concern regarding bacteria in pt's urine and patient's overall kidneys. Dr. Aimee Zhang perfect served regarding this, with Ashly's phone number.
Patient discharge completed. Discharge information included information on diagnosis including signs and symptoms, complications and when to seek medical attention. Information on new medications also provided included use for the medication, side effects and when to call the doctor. Patient verbalized understanding of all discharge information. Patient escorted out by staff with all documented belongings. Home with family.
Physical Therapy  Facility/Department: [facility] A2 CARD TELEMETRY  Physical Therapy Initial Assessment/Treatment     Name: Daniel Shipley  : 1935  MRN: [id number]  Date of Service: 2023    Discharge Recommendations:  Home with assist PRN, Home with Home health PT   PT Equipment Recommendations  Equipment Needed: No      Patient Diagnosis(es): The primary encounter diagnosis was Atrial fibrillation with RVR ([address]). A diagnosis of Hypomagnesemia was also pertinent to this visit. Past Medical History:  has a past medical history of Breast cancer ([address]), Cancer ([address]), Diabetes mellitus ([address]), GERD (gastroesophageal reflux disease), History of therapeutic radiation, Hyperlipidemia, Hypertension, Influenza A, and Kidney stone. Past Surgical History:  has a past surgical history that includes Breast lumpectomy; Hysterectomy; Lithotripsy; Rectal surgery; Colonoscopy (2015); Pain management procedure (Right, 2022); Pain management procedure (Right, 2022); Pain management procedure (Right, 2022); and Nerve Block (Right, 9/15/2022). Assessment   Body Structures, Functions, Activity Limitations Requiring Skilled Therapeutic Intervention: Decreased functional mobility ; Decreased endurance;Decreased strength;Decreased balance;Decreased posture; Increased pain  Assessment: Pt to [facility] with diagnosis of A-fib with RVR. PTA pt lives alone in single story apartment with no FAIZA. Pt was independent with transfers and ambulation, No AD in the house and rollator in the community. Pt currently is able to complete bed mobility with SBA, transfers with CGA, and ambulate short distances in the room with CGA. Gait unsteady to the bathroom with no AD, reaching for walls and door for balance. Imporved balance and safety when using RW to ambulate to the chair. Pt limited by high HR. Increased to the 120s-130s after going to the bathroom. recovered to 100's with seated rest break prior to ambulating to the chair.
Physical Therapy  Facility/Department: [facility] A2 CARD TELEMETRY  Daily Treatment Note  NAME: Braon Lozano  : 1935  MRN: [id number]    Date of Service: 2023    Discharge Recommendations:  Home with assist PRN, Home with Home health PT   PT Equipment Recommendations  Equipment Needed: No    Patient Diagnosis(es): The primary encounter diagnosis was Atrial fibrillation with RVR ([address] A diagnosis of Hypomagnesemia was also pertinent to this visit. Assessment   Assessment: Pt tolerated tx well with progression of amb distance. Pt able to amb 10+10+75 ft with RW and CGA progressing to SBA. Able to complete bed mobility with min A and transfers with CGA to SBA. Pt would benefit from continued skilled PT to address current deficits and work toward achieving therapy goals. Recommend pt return home upon d/c with assist PRN. Activity Tolerance: Patient tolerated treatment well;Patient limited by fatigue;Patient limited by pain  Equipment Needed: No     Plan    Physcial Therapy Plan  General Plan: 3-5 times per week  Current Treatment Recommendations: Strengthening;ROM;Balance training;Gait training;Home exercise program;Safety education & training; Therapeutic activities; Functional mobility training;Transfer training; Endurance training;Positioning;Pain management     Restrictions  Restrictions/Precautions  Restrictions/Precautions: Fall Risk, Up as Tolerated  Required Braces or Orthoses?: No  Position Activity Restriction  Other position/activity restrictions: IV, tele     Subjective    Subjective  Subjective: Pt agreeable PT. Pain: Reports some pain in back and R ankle with mobility, does not give rating.   Orientation  Overall Orientation Status: Within Normal Limits  Orientation Level: Oriented X4  Cognition  Overall Cognitive Status: WFL     Objective   Vitals   /75, HR 52, SpO2 95%  HR 72 and SpO2 98% after ambulation   Bed Mobility Training  Bed Mobility Training: Yes  Supine to Sit: Adaptive
Physical Therapy  Pt out of room for a procedure in cath lab. Will check back at a later point and cont to follow.     Anamika Mix PTA
Pt arrived to room 103 with all belongings. Pt stated she notified her family of moving rooms. Oriented to the room and waiting on physician to round.
Pt restless, climbing out of bed, high risk of falls, reoriented patient to remain in bed for safety without success. Gabriela camera placed in room and activated. Will continue to monitor.
apixaban. TSH recently normal.  Replaced electrolytes. - I asked the patient to have someone bring in all her pill bottles from home. Chronic diastolic CHF. UC discharged her with furosemide 20 mg po qd last time. Resumed this. Mild FLORES resolved here. DM2. Insulin. HLD. Statin. Diet: ADULT DIET; Regular; 3 carb choices (45 gm/meal); Low Fat/Low Chol/High Fiber/2 gm Na  DVT Prophylaxis: anticoagulation as above  Appropriate for A1: no  Disposition: PT/OT ordered. Ready when HR stable off dilt gtt, perhaps 7/27 - 7/29.      -----------------------------------------------------------------------                              PCP: Rubén Green MD    Date of Admission: 7/25/2023  Current Hospital Day: 2    Chief Admission Complaint: Shortness of Breath (Pt to ED per EMS with complaint of SOB that started earlier this morning. Per EMS, pt was noted to be in A fib RVR. Pt denies ant CP at this time. Pt also endorsing nausea. A/Ox4 at this time. )       Atrial fibrillation with rapid ventricular response (HCC)    Medications:  Personally reviewed in detail in conjunction w/ labs as documented for evidence of drug toxicity.      Infusion Medications    dilTIAZem (CARDIZEM) 100 mg in 0.9% sodium chloride 100 mL (ADD-[place]) 10 mg/hr (07/26/23 0843)    sodium chloride      sodium chloride 75 mL/hr at 07/25/23 2235    dextrose       Scheduled Medications    furosemide  20 mg Oral Daily    magnesium oxide  400 mg Oral BID    potassium chloride  40 mEq Oral Once    [START ON 7/27/2023] metoprolol succinate  100 mg Oral Daily    apixaban  5 mg Oral BID    cyanocobalamin  1,000 mcg Oral Daily    insulin glargine  27 Units SubCUTAneous Nightly    trospium  20 mg Oral Daily    pravastatin  20 mg Oral Nightly    sodium chloride flush  5-40 mL IntraVENous 2 times per day    insulin lispro  0-8 Units SubCUTAneous TID WC    insulin lispro  0-4 Units SubCUTAneous Nightly    pantoprazole  40 mg IntraVENous
COVID were negative. Repeat CXR in a few weeks with PCP. DM2. She says she was taking glargine 32 qAM and  34 qPM, also lispro 25+SSI TIDAC. A1c 7.7. Adjusted insulin regimen while here. Delirium, with behavioral disturbances overnight. Avoid sedatives as able. Patient says her memory has been worsening slowly. Suspect underlying mild dementia. Supportive care. No focal neurological deficits. HLD. Statin. Chronic hyponatremia. Baseline Na seems to be about 130 over the years. Monitor. Fluid restriction. CKD3. Baseline Cr has been about 1.3 for the last couple of months. Continue to monitor as outpatient with PCP. Diet: ADULT DIET; Regular; 2000 ml  DVT Prophylaxis: anticoagulation as above  DNR-CCA / DNI per POA  Appropriate for A1: no  Disposition: PT/OT rec'd home PT/OT. Hopefully 7/31 if we can be more confident that she isn't going to have any setbacks in the near future after discharge. She lives alone in a senior living apartment. Her friend is considering moving into the same apartment complex, but not the same apartment. Her daughter/POA lives in [place].      -----------------------------------------------------------------------                              PCP: Lila Engle MD    Date of Admission: 7/25/2023  Current Hospital Day: 6    Chief Admission Complaint: Shortness of Breath (Pt to ED per EMS with complaint of SOB that started earlier this morning. Per EMS, pt was noted to be in A fib RVR. Pt denies ant CP at this time. Pt also endorsing nausea. A/Ox4 at this time. )       Atrial fibrillation with rapid ventricular response (HCC)    Medications:  Personally reviewed in detail in conjunction w/ labs as documented for evidence of drug toxicity.      Infusion Medications    sodium chloride      dextrose       Scheduled Medications    dilTIAZem  120 mg Oral Daily    melatonin  5 mg Oral Nightly    insulin glargine  50 Units SubCUTAneous Nightly
PCP.    DM2. She says she was taking glargine 32 qAM and  34 qPM, also lispro 25+SSI TIDAC. A1c 7.7. Adjusted insulin regimen while here. Delirium, with behavioral disturbances overnight. Avoid sedatives as able. Patient says her memory has been worsening slowly. Suspect underlying mild dementia. Supportive care. No focal neurological deficits. HLD. Statin. Chronic hyponatremia. Baseline Na seems to be about 130 over the years. Monitor. Fluid restriction. CKD3. Baseline Cr has been about 1.3 for the last couple of months. Continue to monitor as outpatient with PCP. Diet: Diet NPO Exceptions are: Sips of Water with Meds  DVT Prophylaxis: anticoagulation as above  DNR-CCA / DNI per POA  Appropriate for A1: no  Disposition: PT/OT rec'd home PT/OT. Ready when cardiology is OK with discharge, perhaps 7/29. She lives alone in a senior living apartment. Her friend is considering moving into the same apartment complex, but not the same apartment. Her daughter/POA lives in [place].      -----------------------------------------------------------------------                              PCP: Durwood Oppenheim, MD    Date of Admission: 7/25/2023  Current Hospital Day: 4    Chief Admission Complaint: Shortness of Breath (Pt to ED per EMS with complaint of SOB that started earlier this morning. Per EMS, pt was noted to be in A fib RVR. Pt denies ant CP at this time. Pt also endorsing nausea. A/Ox4 at this time. )       Atrial fibrillation with rapid ventricular response (HCC)    Medications:  Personally reviewed in detail in conjunction w/ labs as documented for evidence of drug toxicity.      Infusion Medications    dilTIAZem (CARDIZEM) 100 mg in 0.9% sodium chloride 100 mL (ADD-[place]) 10 mg/hr (07/26/23 0741)    sodium chloride      dextrose       Scheduled Medications    insulin glargine  50 Units SubCUTAneous Nightly    trospium  20 mg Oral BID AC    sotalol  40 mg Oral BID
Dante Vyas, OTR/L
tablet 16 g  4 tablet Oral PRN Penne Slipper, APRN - CNP        dextrose bolus 10% 125 mL  125 mL IntraVENous PRN Penne Slipper, APRN - CNP        Or    dextrose bolus 10% 250 mL  250 mL IntraVENous PRN Penne Slipper, APRN - CNP        glucagon injection 1 mg  1 mg SubCUTAneous PRN Penne Slipper, APRN - CNP        dextrose 10 % infusion   IntraVENous Continuous PRN Penne Slipper, APRN - CNP             ECG Interpretation:  (Date: 7/25/2023)  Rhythm: Atrial fibrillation  Rate: 140 BPM  PAC's / PVC's present: None  Conduction abnormalities:  none      Echocardiogram: (2023)    Results for orders placed or performed during the hospital encounter of 05/26/23   Echo Complete   Result Value Ref Range    Left Ventricular Ejection Fraction 40     LVEF MODALITY ECHO     Narrative    Transthoracic Echocardiography Report (TTE)          Summary   Left ventricular systolic function is moderately reduced with a 3D   calculated EF of 40%. The left ventricle is mildly dilated in size with mild concentric   hypertrophy. Global hypokinesis without obvious regional variation. Elevated left atrial pressures with a septal E/e' ratio of 17.1. The right ventricle is normal in size with reduced systolic function. The left atrium is severely enlarged. The right atrium is mildly enlarged. Systolic pulmonary artery pressure (SPAP) is normal and estimated at 30 mmHg   (right atrial pressure 3 mmHg). Findings      Left Ventricle   Left ventricular systolic function is moderately reduced with a 3D   calculated EF of 40%. The left ventricle is mildly dilated in size with mild concentric   hypertrophy. Global hypokinesis without obvious regional variation. Elevated left atrial pressures with a septal E/e' ratio of 17.1. Mitral Valve   The mitral valve is normal in structure and function. No evidence of mitral regurgitation. Left Atrium   The left atrium is severely enlarged.       Aortic Valve   The aortic valve

## 2023-08-01 ENCOUNTER — FOLLOWUP TELEPHONE ENCOUNTER (OUTPATIENT)
Dept: TELEMETRY | Age: 88
End: 2023-08-01

## 2023-08-01 NOTE — TELEPHONE ENCOUNTER
Care Transitions Initial Follow Up Call    Call within 2 business days of discharge: Yes     Patient: Guanako Lundberg Patient : 1935 MRN: [id number]    [unfilled]    RARS: Readmission Risk Score: 16.3       Spoke with: Patient     Discharge department/facility: home    Non-face-to-face services provided:  Scheduled appointment with NICHELLE KEE RN with patient now    Call placed to [facility] for hospital [facility] Denies any needs. Provided CHF resource line for ongoing support. Follow Up  No future appointments.     Evita Obregon, RN

## 2023-08-07 ENCOUNTER — TELEPHONE (OUTPATIENT)
Dept: CASE MANAGEMENT | Age: 88
End: 2023-08-07

## 2023-08-07 NOTE — TELEPHONE ENCOUNTER
Imaging report Chest XR 7/25/23 with f/u imaging recommendations sent to Ralph Avalos MD    [facility](R)  [email or web address]

## 2023-09-03 ENCOUNTER — APPOINTMENT (OUTPATIENT)
Dept: CT IMAGING | Age: 88
DRG: 871 | End: 2023-09-03
Payer: MEDICARE

## 2023-09-03 ENCOUNTER — HOSPITAL ENCOUNTER (INPATIENT)
Age: 88
LOS: 2 days | Discharge: HOME HEALTH | DRG: 871 | End: 2023-09-05
Attending: STUDENT IN AN ORGANIZED HEALTH CARE EDUCATION/TRAINING PROGRAM | Admitting: SURGERY
Payer: MEDICARE

## 2023-09-03 ENCOUNTER — APPOINTMENT (OUTPATIENT)
Dept: GENERAL RADIOLOGY | Age: 88
DRG: 871 | End: 2023-09-03
Payer: MEDICARE

## 2023-09-03 DIAGNOSIS — E83.42 HYPOMAGNESEMIA: Primary | ICD-10-CM

## 2023-09-03 DIAGNOSIS — J18.9 PNEUMONIA OF RIGHT LOWER LOBE DUE TO INFECTIOUS ORGANISM: ICD-10-CM

## 2023-09-03 DIAGNOSIS — A41.9 SEPSIS WITHOUT ACUTE ORGAN DYSFUNCTION, DUE TO UNSPECIFIED ORGANISM (HCC): ICD-10-CM

## 2023-09-03 DIAGNOSIS — E16.2 HYPOGLYCEMIA: ICD-10-CM

## 2023-09-03 DIAGNOSIS — E87.6 HYPOKALEMIA: ICD-10-CM
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
## 2023-09-03 PROBLEM — R55 SYNCOPE: Status: ACTIVE | Noted: 2023-09-03

## 2023-09-03 LAB
ALBUMIN SERPL-MCNC: 3.3 G/DL (ref 3.4–5)
ALBUMIN/GLOB SERPL: 1 {RATIO} (ref 1.1–2.2)
ALP SERPL-CCNC: 80 U/L (ref 40–129)
ALT SERPL-CCNC: 12 U/L (ref 10–40)
ANION GAP SERPL CALCULATED.3IONS-SCNC: 13 MMOL/L (ref 3–16)
AST SERPL-CCNC: 28 U/L (ref 15–37)
BASOPHILS # BLD: 0.2 K/UL (ref 0–0.2)
BASOPHILS NFR BLD: 0.9 %
BILIRUB SERPL-MCNC: 0.5 MG/DL (ref 0–1)
BILIRUB UR QL STRIP.AUTO: NEGATIVE
BUN SERPL-MCNC: 22 MG/DL (ref 7–20)
CALCIUM SERPL-MCNC: 8.6 MG/DL (ref 8.3–10.6)
CHLORIDE SERPL-SCNC: 97 MMOL/L (ref 99–110)
CLARITY UR: CLEAR
CO2 SERPL-SCNC: 23 MMOL/L (ref 21–32)
COLOR UR: YELLOW
CREAT SERPL-MCNC: 1.2 MG/DL (ref 0.6–1.2)
DEPRECATED RDW RBC AUTO: 18.4 % (ref 12.4–15.4)
EOSINOPHIL # BLD: 0 K/UL (ref 0–0.6)
EOSINOPHIL NFR BLD: 0 %
FLUAV RNA RESP QL NAA+PROBE: NOT DETECTED
FLUBV RNA RESP QL NAA+PROBE: NOT DETECTED
GFR SERPLBLD CREATININE-BSD FMLA CKD-EPI: 44 ML/MIN/{1.73_M2}
GLUCOSE BLD-MCNC: 132 MG/DL (ref 70–99)
GLUCOSE BLD-MCNC: 150 MG/DL (ref 70–99)
GLUCOSE BLD-MCNC: 88 MG/DL (ref 70–99)
GLUCOSE SERPL-MCNC: 97 MG/DL (ref 70–99)
GLUCOSE UR STRIP.AUTO-MCNC: NEGATIVE MG/DL
HCT VFR BLD AUTO: 30.8 % (ref 36–48)
HGB BLD-MCNC: 9.7 G/DL (ref 12–16)
HGB UR QL STRIP.AUTO: NEGATIVE
KETONES UR STRIP.AUTO-MCNC: NEGATIVE MG/DL
LACTATE BLDV-SCNC: 1.3 MMOL/L (ref 0.4–1.9)
LACTATE BLDV-SCNC: 1.8 MMOL/L (ref 0.4–1.9)
LEUKOCYTE ESTERASE UR QL STRIP.AUTO: NEGATIVE
LYMPHOCYTES # BLD: 0.8 K/UL (ref 1–5.1)
LYMPHOCYTES NFR BLD: 3.7 %
MAGNESIUM SERPL-MCNC: 1.3 MG/DL (ref 1.8–2.4)
MCH RBC QN AUTO: 24 PG (ref 26–34)
MCHC RBC AUTO-ENTMCNC: 31.5 G/DL (ref 31–36)
MCV RBC AUTO: 76.2 FL (ref 80–100)
MONOCYTES # BLD: 1.3 K/UL (ref 0–1.3)
MONOCYTES NFR BLD: 6 %
NEUTROPHILS # BLD: 19.3 K/UL (ref 1.7–7.7)
NEUTROPHILS NFR BLD: 89.4 %
NITRITE UR QL STRIP.AUTO: NEGATIVE
PERFORMED ON: ABNORMAL
PERFORMED ON: ABNORMAL
PERFORMED ON: NORMAL
PH UR STRIP.AUTO: 6 [PH] (ref 5–8)
PLATELET # BLD AUTO: 345 K/UL (ref 135–450)
PMV BLD AUTO: 7.9 FL (ref 5–10.5)
POTASSIUM SERPL-SCNC: 2.8 MMOL/L (ref 3.5–5.1)
PROT SERPL-MCNC: 6.5 G/DL (ref 6.4–8.2)
PROT UR STRIP.AUTO-MCNC: NEGATIVE MG/DL
RBC # BLD AUTO: 4.04 M/UL (ref 4–5.2)
REASON FOR REJECTION: NORMAL
REJECTED TEST: NORMAL
SARS-COV-2 RNA RESP QL NAA+PROBE: NOT DETECTED
SODIUM SERPL-SCNC: 133 MMOL/L (ref 136–145)
SP GR UR STRIP.AUTO: 1.01 (ref 1–1.03)
UA DIPSTICK W REFLEX MICRO PNL UR: NORMAL
URN SPEC COLLECT METH UR: NORMAL
UROBILINOGEN UR STRIP-ACNC: 0.2 E.U./DL
WBC # BLD AUTO: 21.6 K/UL (ref 4–11)

## 2023-09-03 PROCEDURE — 6370000000 HC RX 637 (ALT 250 FOR IP): Performed by: NURSE PRACTITIONER

## 2023-09-03 PROCEDURE — 1200000000 HC SEMI PRIVATE

## 2023-09-03 PROCEDURE — 71045 X-RAY EXAM CHEST 1 VIEW: CPT

## 2023-09-03 PROCEDURE — 83735 ASSAY OF MAGNESIUM: CPT

## 2023-09-03 PROCEDURE — 96365 THER/PROPH/DIAG IV INF INIT: CPT

## 2023-09-03 PROCEDURE — 6360000002 HC RX W HCPCS: Performed by: NURSE PRACTITIONER

## 2023-09-03 PROCEDURE — 2580000003 HC RX 258: Performed by: NURSE PRACTITIONER

## 2023-09-03 PROCEDURE — 87636 SARSCOV2 & INF A&B AMP PRB: CPT

## 2023-09-03 PROCEDURE — 99285 EMERGENCY DEPT VISIT HI MDM: CPT

## 2023-09-03 PROCEDURE — 70450 CT HEAD/BRAIN W/O DYE: CPT

## 2023-09-03 PROCEDURE — 87040 BLOOD CULTURE FOR BACTERIA: CPT

## 2023-09-03 PROCEDURE — 80053 COMPREHEN METABOLIC PANEL: CPT

## 2023-09-03 PROCEDURE — 85025 COMPLETE CBC W/AUTO DIFF WBC: CPT

## 2023-09-03 PROCEDURE — 72125 CT NECK SPINE W/O DYE: CPT

## 2023-09-03 PROCEDURE — 83605 ASSAY OF LACTIC ACID: CPT

## 2023-09-03 PROCEDURE — 81003 URINALYSIS AUTO W/O SCOPE: CPT

## 2023-09-03 PROCEDURE — 93005 ELECTROCARDIOGRAM TRACING: CPT | Performed by: STUDENT IN AN ORGANIZED HEALTH CARE EDUCATION/TRAINING PROGRAM

## 2023-09-03 RX ORDER — MAGNESIUM SULFATE IN WATER 40 MG/ML
2000 INJECTION, SOLUTION INTRAVENOUS ONCE
Status: COMPLETED | OUTPATIENT
Start: 2023-09-03 | End: 2023-09-03

## 2023-09-03 RX ORDER — SODIUM CHLORIDE 9 MG/ML
INJECTION, SOLUTION INTRAVENOUS PRN
Status: DISCONTINUED | OUTPATIENT
Start: 2023-09-03 | End: 2023-09-05 | Stop reason: HOSPADM

## 2023-09-03 RX ORDER — DILTIAZEM HYDROCHLORIDE 120 MG/1
120 CAPSULE, COATED, EXTENDED RELEASE ORAL DAILY
Status: DISCONTINUED | OUTPATIENT
Start: 2023-09-04 | End: 2023-09-05 | Stop reason: HOSPADM

## 2023-09-03 RX ORDER — AZITHROMYCIN 250 MG/1
500 TABLET, FILM COATED ORAL EVERY 24 HOURS
Status: CANCELLED | OUTPATIENT
Start: 2023-09-03 | End: 2023-09-06

## 2023-09-03 RX ORDER — SODIUM CHLORIDE 0.9 % (FLUSH) 0.9 %
5-40 SYRINGE (ML) INJECTION PRN
Status: DISCONTINUED | OUTPATIENT
Start: 2023-09-03 | End: 2023-09-05 | Stop reason: HOSPADM

## 2023-09-03 RX ORDER — VITAMIN B COMPLEX
1000 TABLET ORAL DAILY
Status: DISCONTINUED | OUTPATIENT
Start: 2023-09-04 | End: 2023-09-05 | Stop reason: HOSPADM

## 2023-09-03 RX ORDER — GUAIFENESIN/DEXTROMETHORPHAN 100-10MG/5
5 SYRUP ORAL EVERY 4 HOURS PRN
Status: DISCONTINUED | OUTPATIENT
Start: 2023-09-03 | End: 2023-09-05 | Stop reason: HOSPADM

## 2023-09-03 RX ORDER — ACETAMINOPHEN 325 MG/1
650 TABLET ORAL EVERY 6 HOURS PRN
Status: DISCONTINUED | OUTPATIENT
Start: 2023-09-03 | End: 2023-09-05 | Stop reason: HOSPADM

## 2023-09-03 RX ORDER — ACETAMINOPHEN 650 MG/1
650 SUPPOSITORY RECTAL EVERY 6 HOURS PRN
Status: DISCONTINUED | OUTPATIENT
Start: 2023-09-03 | End: 2023-09-05 | Stop reason: HOSPADM

## 2023-09-03 RX ORDER — POLYETHYLENE GLYCOL 3350 17 G/17G
17 POWDER, FOR SOLUTION ORAL DAILY PRN
Status: DISCONTINUED | OUTPATIENT
Start: 2023-09-03 | End: 2023-09-05 | Stop reason: HOSPADM

## 2023-09-03 RX ORDER — INSULIN GLARGINE 100 [IU]/ML
40 INJECTION, SOLUTION SUBCUTANEOUS NIGHTLY
Status: CANCELLED | OUTPATIENT
Start: 2023-09-03

## 2023-09-03 RX ORDER — SODIUM CHLORIDE 0.9 % (FLUSH) 0.9 %
5-40 SYRINGE (ML) INJECTION EVERY 12 HOURS SCHEDULED
Status: DISCONTINUED | OUTPATIENT
Start: 2023-09-04 | End: 2023-09-05 | Stop reason: HOSPADM

## 2023-09-03 RX ORDER — POTASSIUM CHLORIDE 20 MEQ/1
20 TABLET, EXTENDED RELEASE ORAL DAILY
Status: DISCONTINUED | OUTPATIENT
Start: 2023-09-04 | End: 2023-09-05 | Stop reason: HOSPADM

## 2023-09-03 RX ORDER — CHOLECALCIFEROL (VITAMIN D3) 125 MCG
1000 CAPSULE ORAL DAILY
Status: DISCONTINUED | OUTPATIENT
Start: 2023-09-04 | End: 2023-09-05 | Stop reason: HOSPADM

## 2023-09-03 RX ORDER — ONDANSETRON 2 MG/ML
4 INJECTION INTRAMUSCULAR; INTRAVENOUS EVERY 6 HOURS PRN
Status: DISCONTINUED | OUTPATIENT
Start: 2023-09-03 | End: 2023-09-05 | Stop reason: HOSPADM

## 2023-09-03 RX ORDER — LANOLIN ALCOHOL/MO/W.PET/CERES
400 CREAM (GRAM) TOPICAL DAILY
Status: DISCONTINUED | OUTPATIENT
Start: 2023-09-04 | End: 2023-09-05 | Stop reason: HOSPADM

## 2023-09-03 RX ORDER — METOPROLOL SUCCINATE 50 MG/1
100 TABLET, EXTENDED RELEASE ORAL 2 TIMES DAILY
Status: DISCONTINUED | OUTPATIENT
Start: 2023-09-04 | End: 2023-09-05 | Stop reason: HOSPADM

## 2023-09-03 RX ORDER — POTASSIUM CHLORIDE 20 MEQ/1
40 TABLET, EXTENDED RELEASE ORAL ONCE
Status: COMPLETED | OUTPATIENT
Start: 2023-09-03 | End: 2023-09-03

## 2023-09-03 RX ORDER — DEXTROSE MONOHYDRATE 100 MG/ML
INJECTION, SOLUTION INTRAVENOUS CONTINUOUS PRN
Status: DISCONTINUED | OUTPATIENT
Start: 2023-09-03 | End: 2023-09-04 | Stop reason: SDUPTHER

## 2023-09-03 RX ORDER — TROSPIUM CHLORIDE 20 MG/1
20 TABLET, FILM COATED ORAL
Status: DISCONTINUED | OUTPATIENT
Start: 2023-09-04 | End: 2023-09-05 | Stop reason: HOSPADM

## 2023-09-03 RX ORDER — 0.9 % SODIUM CHLORIDE 0.9 %
1000 INTRAVENOUS SOLUTION INTRAVENOUS ONCE
Status: COMPLETED | OUTPATIENT
Start: 2023-09-03 | End: 2023-09-03

## 2023-09-03 RX ORDER — INSULIN LISPRO 100 [IU]/ML
0-4 INJECTION, SOLUTION INTRAVENOUS; SUBCUTANEOUS NIGHTLY
Status: CANCELLED | OUTPATIENT
Start: 2023-09-03

## 2023-09-03 RX ORDER — ONDANSETRON 4 MG/1
4 TABLET, ORALLY DISINTEGRATING ORAL EVERY 8 HOURS PRN
Status: DISCONTINUED | OUTPATIENT
Start: 2023-09-03 | End: 2023-09-05 | Stop reason: HOSPADM

## 2023-09-03 RX ORDER — PRAVASTATIN SODIUM 20 MG
20 TABLET ORAL NIGHTLY
Status: DISCONTINUED | OUTPATIENT
Start: 2023-09-04 | End: 2023-09-05 | Stop reason: HOSPADM

## 2023-09-03 RX ORDER — ALBUTEROL SULFATE 2.5 MG/3ML
2.5 SOLUTION RESPIRATORY (INHALATION) EVERY 4 HOURS PRN
Status: DISCONTINUED | OUTPATIENT
Start: 2023-09-03 | End: 2023-09-05 | Stop reason: HOSPADM

## 2023-09-03 RX ORDER — INSULIN LISPRO 100 [IU]/ML
0-4 INJECTION, SOLUTION INTRAVENOUS; SUBCUTANEOUS
Status: CANCELLED | OUTPATIENT
Start: 2023-09-04

## 2023-09-03 RX ADMIN — SODIUM CHLORIDE 1000 ML: 9 INJECTION, SOLUTION INTRAVENOUS at 20:44

## 2023-09-03 RX ADMIN — AZITHROMYCIN MONOHYDRATE 500 MG: 500 INJECTION, POWDER, LYOPHILIZED, FOR SOLUTION INTRAVENOUS at 23:12

## 2023-09-03 RX ADMIN — MAGNESIUM SULFATE HEPTAHYDRATE 2000 MG: 40 INJECTION, SOLUTION INTRAVENOUS at 21:09

## 2023-09-03 RX ADMIN — POTASSIUM CHLORIDE 40 MEQ: 1500 TABLET, EXTENDED RELEASE ORAL at 20:20

## 2023-09-03 ASSESSMENT — PAIN - FUNCTIONAL ASSESSMENT: PAIN_FUNCTIONAL_ASSESSMENT: 0-10

## 2023-09-03 ASSESSMENT — PAIN SCALES - GENERAL
PAINLEVEL_OUTOF10: 0
PAINLEVEL_OUTOF10: 0

## 2023-09-03 ASSESSMENT — LIFESTYLE VARIABLES
HOW MANY STANDARD DRINKS CONTAINING ALCOHOL DO YOU HAVE ON A TYPICAL DAY: PATIENT DOES NOT DRINK
HOW OFTEN DO YOU HAVE A DRINK CONTAINING ALCOHOL: NEVER

## 2023-09-03 NOTE — ED NOTES
Labs re drawn, sent to lab. Pt eating a snack/milk per NP. Pt family at bedside. Pt/family instructed to call for assistance if pt needs to get up for any reason.       Krys Guzman RN  09/03/23 9450

## 2023-09-04 LAB
ANION GAP SERPL CALCULATED.3IONS-SCNC: 12 MMOL/L (ref 3–16)
BASOPHILS # BLD: 0.1 K/UL (ref 0–0.2)
BASOPHILS NFR BLD: 0.6 %
BUN SERPL-MCNC: 21 MG/DL (ref 7–20)
CALCIUM SERPL-MCNC: 8.8 MG/DL (ref 8.3–10.6)
CHLORIDE SERPL-SCNC: 101 MMOL/L (ref 99–110)
CO2 SERPL-SCNC: 24 MMOL/L (ref 21–32)
CREAT SERPL-MCNC: 1.2 MG/DL (ref 0.6–1.2)
DEPRECATED RDW RBC AUTO: 18 % (ref 12.4–15.4)
EKG ATRIAL RATE: 127 BPM
EKG DIAGNOSIS: NORMAL
EKG Q-T INTERVAL: 286 MS
EKG QRS DURATION: 78 MS
EKG QTC CALCULATION (BAZETT): 381 MS
EKG R AXIS: -9 DEGREES
EKG T AXIS: 149 DEGREES
EKG VENTRICULAR RATE: 107 BPM
EOSINOPHIL # BLD: 0.1 K/UL (ref 0–0.6)
EOSINOPHIL NFR BLD: 0.7 %
GFR SERPLBLD CREATININE-BSD FMLA CKD-EPI: 44 ML/MIN/{1.73_M2}
GLUCOSE BLD-MCNC: 100 MG/DL (ref 70–99)
GLUCOSE BLD-MCNC: 103 MG/DL (ref 70–99)
GLUCOSE BLD-MCNC: 125 MG/DL (ref 70–99)
GLUCOSE BLD-MCNC: 163 MG/DL (ref 70–99)
GLUCOSE BLD-MCNC: 182 MG/DL (ref 70–99)
GLUCOSE BLD-MCNC: 185 MG/DL (ref 70–99)
GLUCOSE BLD-MCNC: 249 MG/DL (ref 70–99)
GLUCOSE BLD-MCNC: 68 MG/DL (ref 70–99)
GLUCOSE BLD-MCNC: 80 MG/DL (ref 70–99)
GLUCOSE SERPL-MCNC: 106 MG/DL (ref 70–99)
HCT VFR BLD AUTO: 27.4 % (ref 36–48)
HGB BLD-MCNC: 8.7 G/DL (ref 12–16)
IRON SATN MFR SERPL: 9 % (ref 15–50)
IRON SERPL-MCNC: 29 UG/DL (ref 37–145)
LYMPHOCYTES # BLD: 1.5 K/UL (ref 1–5.1)
LYMPHOCYTES NFR BLD: 12.2 %
MAGNESIUM SERPL-MCNC: 1.6 MG/DL (ref 1.8–2.4)
MCH RBC QN AUTO: 24.1 PG (ref 26–34)
MCHC RBC AUTO-ENTMCNC: 31.8 G/DL (ref 31–36)
MCV RBC AUTO: 75.6 FL (ref 80–100)
MONOCYTES # BLD: 0.9 K/UL (ref 0–1.3)
MONOCYTES NFR BLD: 7.4 %
NEUTROPHILS # BLD: 9.7 K/UL (ref 1.7–7.7)
NEUTROPHILS NFR BLD: 79.1 %
PERFORMED ON: ABNORMAL
PERFORMED ON: NORMAL
PLATELET # BLD AUTO: 334 K/UL (ref 135–450)
PMV BLD AUTO: 8.4 FL (ref 5–10.5)
POTASSIUM SERPL-SCNC: 3.6 MMOL/L (ref 3.5–5.1)
PROCALCITONIN SERPL IA-MCNC: 0.85 NG/ML (ref 0–0.15)
RBC # BLD AUTO: 3.63 M/UL (ref 4–5.2)
SODIUM SERPL-SCNC: 137 MMOL/L (ref 136–145)
TIBC SERPL-MCNC: 321 UG/DL (ref 260–445)
WBC # BLD AUTO: 12.3 K/UL (ref 4–11)

## 2023-09-04 PROCEDURE — 1200000000 HC SEMI PRIVATE

## 2023-09-04 PROCEDURE — 6360000002 HC RX W HCPCS: Performed by: INTERNAL MEDICINE

## 2023-09-04 PROCEDURE — 83550 IRON BINDING TEST: CPT

## 2023-09-04 PROCEDURE — 87641 MR-STAPH DNA AMP PROBE: CPT

## 2023-09-04 PROCEDURE — 93010 ELECTROCARDIOGRAM REPORT: CPT | Performed by: INTERNAL MEDICINE

## 2023-09-04 PROCEDURE — 84145 PROCALCITONIN (PCT): CPT

## 2023-09-04 PROCEDURE — 83540 ASSAY OF IRON: CPT

## 2023-09-04 PROCEDURE — 6370000000 HC RX 637 (ALT 250 FOR IP): Performed by: INTERNAL MEDICINE

## 2023-09-04 PROCEDURE — 2580000003 HC RX 258: Performed by: INTERNAL MEDICINE

## 2023-09-04 PROCEDURE — 2580000003 HC RX 258: Performed by: NURSE PRACTITIONER

## 2023-09-04 PROCEDURE — 83735 ASSAY OF MAGNESIUM: CPT

## 2023-09-04 PROCEDURE — 85025 COMPLETE CBC W/AUTO DIFF WBC: CPT

## 2023-09-04 PROCEDURE — 6360000002 HC RX W HCPCS: Performed by: NURSE PRACTITIONER

## 2023-09-04 PROCEDURE — 36415 COLL VENOUS BLD VENIPUNCTURE: CPT

## 2023-09-04 PROCEDURE — 80048 BASIC METABOLIC PNL TOTAL CA: CPT

## 2023-09-04 PROCEDURE — 6370000000 HC RX 637 (ALT 250 FOR IP): Performed by: NURSE PRACTITIONER

## 2023-09-04 RX ORDER — POTASSIUM CHLORIDE 7.45 MG/ML
10 INJECTION INTRAVENOUS PRN
Status: DISCONTINUED | OUTPATIENT
Start: 2023-09-04 | End: 2023-09-05 | Stop reason: HOSPADM

## 2023-09-04 RX ORDER — DEXTROSE MONOHYDRATE 100 MG/ML
INJECTION, SOLUTION INTRAVENOUS CONTINUOUS PRN
Status: DISCONTINUED | OUTPATIENT
Start: 2023-09-04 | End: 2023-09-05 | Stop reason: HOSPADM

## 2023-09-04 RX ORDER — MAGNESIUM SULFATE 1 G/100ML
1000 INJECTION INTRAVENOUS PRN
Status: DISCONTINUED | OUTPATIENT
Start: 2023-09-04 | End: 2023-09-05 | Stop reason: HOSPADM

## 2023-09-04 RX ORDER — INSULIN LISPRO 100 [IU]/ML
0-4 INJECTION, SOLUTION INTRAVENOUS; SUBCUTANEOUS NIGHTLY
Status: DISCONTINUED | OUTPATIENT
Start: 2023-09-04 | End: 2023-09-05 | Stop reason: HOSPADM

## 2023-09-04 RX ORDER — POTASSIUM CHLORIDE 20 MEQ/1
40 TABLET, EXTENDED RELEASE ORAL PRN
Status: DISCONTINUED | OUTPATIENT
Start: 2023-09-04 | End: 2023-09-05 | Stop reason: HOSPADM

## 2023-09-04 RX ORDER — INSULIN LISPRO 100 [IU]/ML
0-4 INJECTION, SOLUTION INTRAVENOUS; SUBCUTANEOUS
Status: DISCONTINUED | OUTPATIENT
Start: 2023-09-04 | End: 2023-09-05 | Stop reason: HOSPADM

## 2023-09-04 RX ORDER — INSULIN GLARGINE 100 [IU]/ML
40 INJECTION, SOLUTION SUBCUTANEOUS NIGHTLY
Status: DISCONTINUED | OUTPATIENT
Start: 2023-09-04 | End: 2023-09-05 | Stop reason: HOSPADM

## 2023-09-04 RX ADMIN — CEFEPIME 2000 MG: 2 INJECTION, POWDER, FOR SOLUTION INTRAVENOUS at 03:14

## 2023-09-04 RX ADMIN — CEFEPIME 2000 MG: 2 INJECTION, POWDER, FOR SOLUTION INTRAVENOUS at 15:36

## 2023-09-04 RX ADMIN — APIXABAN 5 MG: 5 TABLET, FILM COATED ORAL at 00:36

## 2023-09-04 RX ADMIN — METOPROLOL SUCCINATE 100 MG: 50 TABLET, EXTENDED RELEASE ORAL at 01:18

## 2023-09-04 RX ADMIN — SODIUM CHLORIDE, PRESERVATIVE FREE 10 ML: 5 INJECTION INTRAVENOUS at 08:37

## 2023-09-04 RX ADMIN — Medication 1000 UNITS: at 08:33

## 2023-09-04 RX ADMIN — METOPROLOL SUCCINATE 100 MG: 50 TABLET, EXTENDED RELEASE ORAL at 20:34

## 2023-09-04 RX ADMIN — TROSPIUM CHLORIDE 20 MG: 20 TABLET, FILM COATED ORAL at 15:29

## 2023-09-04 RX ADMIN — PRAVASTATIN SODIUM 20 MG: 20 TABLET ORAL at 20:34

## 2023-09-04 RX ADMIN — METOPROLOL SUCCINATE 100 MG: 50 TABLET, EXTENDED RELEASE ORAL at 08:34

## 2023-09-04 RX ADMIN — CYANOCOBALAMIN TAB 500 MCG 1000 MCG: 500 TAB at 08:33

## 2023-09-04 RX ADMIN — POTASSIUM CHLORIDE 20 MEQ: 1500 TABLET, EXTENDED RELEASE ORAL at 08:34

## 2023-09-04 RX ADMIN — VANCOMYCIN HYDROCHLORIDE 750 MG: 750 INJECTION, POWDER, LYOPHILIZED, FOR SOLUTION INTRAVENOUS at 20:47

## 2023-09-04 RX ADMIN — APIXABAN 5 MG: 5 TABLET, FILM COATED ORAL at 08:34

## 2023-09-04 RX ADMIN — APIXABAN 5 MG: 5 TABLET, FILM COATED ORAL at 20:34

## 2023-09-04 RX ADMIN — PRAVASTATIN SODIUM 20 MG: 20 TABLET ORAL at 00:36

## 2023-09-04 RX ADMIN — TROSPIUM CHLORIDE 20 MG: 20 TABLET, FILM COATED ORAL at 06:38

## 2023-09-04 RX ADMIN — Medication 400 MG: at 08:34

## 2023-09-04 RX ADMIN — DILTIAZEM HYDROCHLORIDE 120 MG: 120 CAPSULE, COATED, EXTENDED RELEASE ORAL at 08:34

## 2023-09-04 RX ADMIN — INSULIN GLARGINE 40 UNITS: 100 INJECTION, SOLUTION SUBCUTANEOUS at 20:36

## 2023-09-04 RX ADMIN — VANCOMYCIN HYDROCHLORIDE 1500 MG: 10 INJECTION, POWDER, LYOPHILIZED, FOR SOLUTION INTRAVENOUS at 01:23

## 2023-09-04 ASSESSMENT — PAIN SCALES - GENERAL: PAINLEVEL_OUTOF10: 0

## 2023-09-04 NOTE — PLAN OF CARE
Problem: Pain  Goal: Verbalizes/displays adequate comfort level or baseline comfort level  9/4/2023 1639 by Jenifer Howard RN  Outcome: Progressing  Flowsheets (Taken 9/4/2023 1639)  Verbalizes/displays adequate comfort level or baseline comfort level:   Encourage patient to monitor pain and request assistance   Assess pain using appropriate pain scale   Administer analgesics based on type and severity of pain and evaluate response   Implement non-pharmacological measures as appropriate and evaluate response

## 2023-09-04 NOTE — ED NOTES
Unable to obtain second iv, after two RN attempts.  IV abx to be started when iv mag complete      Yaw , RN  09/03/23 2923

## 2023-09-04 NOTE — PLAN OF CARE
Problem: Pain  Goal: Verbalizes/displays adequate comfort level or baseline comfort level  Outcome: Progressing     Problem: Safety - Adult  Goal: Free from fall injury  Outcome: Progressing     Problem: ABCDS Injury Assessment  Goal: Absence of physical injury  Outcome: Progressing     Continue with plan of care.

## 2023-09-04 NOTE — ED NOTES
Report given to C3 RN Pt to floor via stretcher all belongings sent with pt     Smitha Serna RN  09/03/23 4302

## 2023-09-04 NOTE — H&P
Hospital Medicine History & Physical        Date of Service: 09/03/2023    Time of Service: 2240    Disposition:    [x]Admitted to inpatient status with expected LOS greater than two midnights due to medical therapy. []Placed in observation status. Chief Admission Complaint:  Syncope at home, hypoglycemia    Presenting Admission History:      Brenda Lora is a/an 82 y.o. female with a significant past medical history of hypertension, atrial fibrillation, chronic diastolic heart failure, and type 2 diabetes who presents to [facility] emergency department via EMS after sustaining a syncopal episode at home. EMS was activated, initial glucose on their assessment showed 42. She received glucose en route. On arrival here, BG here was 97 then 88. She was snacks in the ED, with repeat . She admits to administering her lunch dose of lispro but states it was 25 units, but I see she is prescribed 5 units TID; she then admits she can't remember how much exactly. She denies any prodrome of syncope otherwise, no palpitations, acute chest pain, or dyspnea. She endorses she has at her usual level of health over the last few days, including this morning. She is unsure how long she was unconscious. She denies any chest congestion, cough, chest pain, or LE edema. Preliminary evaluation in the ED included cell count, metabolic panel, CT head, CT c-spine, and EKG. EKG showed atrial fibrillation with RVR at 107 bpm. Cell count showed acute leukocytosis at 21, hemoglobin 9.7, and platelets 891. Metabolic panel showed sodium 133, potassium 2.8, chloride 97, BUN 22, creatinine 1.2 with a GFR of 44. Magnesium was 1.3. Lactic acid was negative at 1.8 then 1.3. CT head and c-spine were negative for acute findings. The leukocytosis prompted the ED to order a urinalysis and chest x-ray.  Chest x-ray revealed increased opacification of the right lower lung and persistent streaky opacities of the left lung when

## 2023-09-04 NOTE — ACP (ADVANCE CARE PLANNING)
Advance Care Planning     Advance Care Planning Inpatient Note  [facility] Department    Today's Date: 9/4/2023  Unit: [place] C3 TELE/MED SURG/ONC    Received request from IDT Member. Upon review of chart and communication with care team, patient's decision making abilities are not in question. . Patient was/were present in the room during visit.     Goals of ACP Conversation:  Discuss advance care planning documents    Health Care Decision Makers:       Primary Decision MakerClaudene [facility] [phone]    Secondary Decision Maker: Meredith Rasmussen  [phone]  Summary:  [address] [facility] [place] [facility]    Advance Care Planning Documents (Patient Wishes):  None     Assessment:  Pt says she has documents    Interventions:  Requested patient/family to submit existing document for our records: Healthcare Power of /Advance Directive Appointment of [address]  Living Will/Advance Directive    Care Preferences Communicated:   No    Outcomes/Plan:  ACP Discussion: Completed    Electronically signed by Chi Anglin on 9/4/2023 at 11:03 AM

## 2023-09-04 NOTE — ED PROVIDER NOTES
Attending Supervisory Note/Shared Visit     I personally saw the patient and performed a substantive portion of the visit including all aspects of the medical decision making as addressed:    82-year-old female who presents with hypoglycemia, generalized weakness, found to have right lower lobe pneumonia on work-up, was given glucose by EMS, now tolerating p.o., glucose was 40s in the field. Magnesium and potassium are low, repleted IV and p.o. respectively. Also found to have A-fib with RVR, likely compensatory secondary to infection, giving IVFs, will reassess, typically is below 110 bpm on each of my evaluations, not requiring further rate control at this time. Admitted, went up in stable condition. Patient with SIRS criteria suggesting sepsis without severe SIRS criteria. Is this patient to be included in the SEP-1 Core Measure due to severe sepsis or septic shock? No   Exclusion criteria - the patient is NOT to be included for SEP-1 Core Measure due to:  May have criteria for sepsis, but does not meet criteria for severe sepsis or septic shock     XR CHEST PORTABLE   Final Result   Increased opacification of the right lower lung, may represent atelectasis or   pneumonia. CT HEAD WO CONTRAST   Final Result   1. No acute intracranial bleed. 2. Senescent changes. 3. White matter disease described is typical of microvascular ischemic   disease or as sequela of dysmyelinating/demyelinating processes. 4.  Vascular calcifications are noted reflecting calcific atherosclerosis. CT CERVICAL SPINE WO CONTRAST   Final Result   No acute abnormality of the cervical spine.            Labs Reviewed   COMPREHENSIVE METABOLIC PANEL W/ REFLEX TO MG FOR LOW K - Abnormal; Notable for the following components:       Result Value    Sodium 133 (*)     Potassium reflex Magnesium 2.8 (*)     Chloride 97 (*)     BUN 22 (*)     Est, Glom Filt Rate 44 (*)     Albumin 3.3 (*)     Albumin/Globulin Ratio
Infection Confirmed or        Suspected. Must meet 1:    [] Lactate > 2       or   [] Signs of Organ Dysfunction:    - SBP < 90 or MAP < 65  - Altered mental status  - Creatinine > 2 or increased from      baseline  - Urine Output < 0.5 ml/kg/hr  - Bilirubin > 2  - INR > 1.5 (not anticoagulated)  - Platelets < 407,684  - Acute Respiratory Failure as     evidenced by new need for NIPPV     or mechanical ventilation      [x] No criteria met for Severe Sepsis. Must meet 1:    [] Lactate > 4        or   [] SBP < 90 or MAP < 65 for at        least two readings in the first        hour after fluid bolus        administration      [] Vasopressors initiated (if hypotension persists after fluid resuscitation)        [x] No criteria met for Septic Shock. Patient Vitals for the past 6 hrs:   BP Pulse Resp SpO2 Height Weight Weight Method Percent Weight Change   09/03/23 1748 (!) 139/100 81 18 94 % 5' 2\" (1.575 m) 250 lb (113.4 kg) Stated 0   09/03/23 1814 (!) 139/96 98 18 94 % -- -- -- --   09/03/23 1822 (!) 140/101 96 15 92 % -- -- -- --   09/03/23 1851 132/77 (!) 101 18 93 % -- -- -- --   09/03/23 1859 -- 100 -- -- -- -- -- --   09/1935 (!) 145/73 (!) 117 21 95 % -- -- -- --      Recent Labs     09/03/23  1847   WBC 21.6*   CREATININE 1.2   BILITOT 0.5            Time  sepsis  Identified: 2028    Fluid Resuscitation Rational: less than 30mL/kg because low EF, concern for fluid overload. Repeat lactate level: not indicated due to initial lactate < 2    Reassessment Exam:   Not applicable. Patient does not have septic shock. Chronic Conditions affecting care:    has a past medical history of Breast cancer ([address]), Cancer ([address]), CHF (congestive heart failure) ([address]), Diabetes mellitus ([address]), GERD (gastroesophageal reflux disease), History of therapeutic radiation, Hyperlipidemia, Hypertension, Influenza A (12/19/2017), and Kidney stone.     CONSULTS: (Who and What was discussed)  None    Records

## 2023-09-04 NOTE — PLAN OF CARE
Problem: Safety - Adult  Goal: Free from fall injury  9/4/2023 1639 by Ana Loco RN  Flowsheets (Taken 9/4/2023 1639)  Free From Fall Injury:   Instruct family/caregiver on patient safety   Based on caregiver fall risk screen, instruct family/caregiver to ask for assistance with transferring infant if caregiver noted to have fall risk factors

## 2023-09-04 NOTE — ACP (ADVANCE CARE PLANNING)
Advance Care Planning     Advance Care Planning (ACP) Physician/NP/PA Conversation    Date of Conversation: 9/3/2023  Conducted with: PatientPatient with Decision Making Capacity  Time Start: 2240  Time End: 2257    Narrative: Patient noted to be DNR-CCA during last hospitalization. This was attempted to be verified, but is electing for CPR, intubation, and resuscitative measures despite having had DNR-CCA during previous hospitalization. Healthcare Decision Maker:    Primary Decision Maker: Светлана Shannan - [phone]    Secondary Decision Maker: Ford Abarca - [phone]  Click here to complete Healthcare Decision Makers including selection of the Healthcare Decision Maker Relationship (ie \"Primary\"). Care Preferences:    Hospitalization: \"If your health worsens and it becomes clear that your chance of recovery is unlikely, what would be your preference regarding hospitalization? \"  The patient would prefer hospitalization. Ventilation: \"If you were unable to breath on your own and your chance of recovery was unlikely, what would be your preference about the use of a ventilator (breathing machine) if it was available to you? \"  The patient would desire the use of a ventilator. Resuscitation: \"In the event your heart stopped as a result of an underlying serious health condition, would you want attempts made to restart your heart, or would you prefer a natural death? \"  Yes, attempt to resuscitate.     treatment goals, benefit/burden of treatment options, ventilation preferences, hospitalization preferences, and resuscitation preferences    Conversation Outcomes / Follow-Up Plan:  ACP complete - no further action today  Reviewed DNR/DNI and patient elects Full Code (Attempt Resuscitation)    Length of Voluntary ACP Conversation in minutes:  16 minutes    Magdaleno Hoffman, APRN - CNP

## 2023-09-05 VITALS
TEMPERATURE: 98 F | OXYGEN SATURATION: 93 % | DIASTOLIC BLOOD PRESSURE: 77 MMHG | RESPIRATION RATE: 18 BRPM | HEART RATE: 90 BPM | BODY MASS INDEX: 46.45 KG/M2 | WEIGHT: 252.43 LBS | SYSTOLIC BLOOD PRESSURE: 121 MMHG | HEIGHT: 62 IN

## 2023-09-05 LAB
ANION GAP SERPL CALCULATED.3IONS-SCNC: 13 MMOL/L (ref 3–16)
BUN SERPL-MCNC: 22 MG/DL (ref 7–20)
CALCIUM SERPL-MCNC: 9.5 MG/DL (ref 8.3–10.6)
CHLORIDE SERPL-SCNC: 102 MMOL/L (ref 99–110)
CO2 SERPL-SCNC: 21 MMOL/L (ref 21–32)
CREAT SERPL-MCNC: 1.2 MG/DL (ref 0.6–1.2)
DEPRECATED RDW RBC AUTO: 18.3 % (ref 12.4–15.4)
GFR SERPLBLD CREATININE-BSD FMLA CKD-EPI: 44 ML/MIN/{1.73_M2}
GLUCOSE BLD-MCNC: 112 MG/DL (ref 70–99)
GLUCOSE BLD-MCNC: 117 MG/DL (ref 70–99)
GLUCOSE BLD-MCNC: 77 MG/DL (ref 70–99)
GLUCOSE SERPL-MCNC: 92 MG/DL (ref 70–99)
HCT VFR BLD AUTO: 31 % (ref 36–48)
HGB BLD-MCNC: 9.8 G/DL (ref 12–16)
MAGNESIUM SERPL-MCNC: 1.7 MG/DL (ref 1.8–2.4)
MCH RBC QN AUTO: 24.1 PG (ref 26–34)
MCHC RBC AUTO-ENTMCNC: 31.6 G/DL (ref 31–36)
MCV RBC AUTO: 76.3 FL (ref 80–100)
MRSA DNA SPEC QL NAA+PROBE: NORMAL
PERFORMED ON: ABNORMAL
PERFORMED ON: ABNORMAL
PERFORMED ON: NORMAL
PLATELET # BLD AUTO: 385 K/UL (ref 135–450)
PMV BLD AUTO: 8 FL (ref 5–10.5)
POTASSIUM SERPL-SCNC: 4 MMOL/L (ref 3.5–5.1)
RBC # BLD AUTO: 4.06 M/UL (ref 4–5.2)
SODIUM SERPL-SCNC: 136 MMOL/L (ref 136–145)
WBC # BLD AUTO: 10.5 K/UL (ref 4–11)

## 2023-09-05 PROCEDURE — 36415 COLL VENOUS BLD VENIPUNCTURE: CPT

## 2023-09-05 PROCEDURE — 2580000003 HC RX 258: Performed by: NURSE PRACTITIONER

## 2023-09-05 PROCEDURE — 6370000000 HC RX 637 (ALT 250 FOR IP): Performed by: NURSE PRACTITIONER

## 2023-09-05 PROCEDURE — 6360000002 HC RX W HCPCS: Performed by: INTERNAL MEDICINE

## 2023-09-05 PROCEDURE — 83735 ASSAY OF MAGNESIUM: CPT

## 2023-09-05 PROCEDURE — 2580000003 HC RX 258: Performed by: INTERNAL MEDICINE

## 2023-09-05 PROCEDURE — 85027 COMPLETE CBC AUTOMATED: CPT

## 2023-09-05 PROCEDURE — 80048 BASIC METABOLIC PNL TOTAL CA: CPT

## 2023-09-05 RX ORDER — IBUPROFEN 600 MG/1
TABLET ORAL
Qty: 5 KIT | Refills: 0 | Status: SHIPPED | OUTPATIENT
Start: 2023-09-05

## 2023-09-05 RX ORDER — DOXYCYCLINE HYCLATE 100 MG
100 TABLET ORAL 2 TIMES DAILY
Qty: 10 TABLET | Refills: 0 | Status: SHIPPED | OUTPATIENT
Start: 2023-09-05 | End: 2023-09-10

## 2023-09-05 RX ADMIN — SODIUM CHLORIDE, PRESERVATIVE FREE 10 ML: 5 INJECTION INTRAVENOUS at 08:09

## 2023-09-05 RX ADMIN — POTASSIUM CHLORIDE 20 MEQ: 1500 TABLET, EXTENDED RELEASE ORAL at 08:07

## 2023-09-05 RX ADMIN — APIXABAN 5 MG: 5 TABLET, FILM COATED ORAL at 08:07

## 2023-09-05 RX ADMIN — CEFEPIME 2000 MG: 2 INJECTION, POWDER, FOR SOLUTION INTRAVENOUS at 03:23

## 2023-09-05 RX ADMIN — DILTIAZEM HYDROCHLORIDE 120 MG: 120 CAPSULE, COATED, EXTENDED RELEASE ORAL at 08:07

## 2023-09-05 RX ADMIN — METOPROLOL SUCCINATE 100 MG: 50 TABLET, EXTENDED RELEASE ORAL at 08:07

## 2023-09-05 RX ADMIN — CYANOCOBALAMIN TAB 500 MCG 1000 MCG: 500 TAB at 08:07

## 2023-09-05 RX ADMIN — TROSPIUM CHLORIDE 20 MG: 20 TABLET, FILM COATED ORAL at 08:07

## 2023-09-05 RX ADMIN — Medication 400 MG: at 08:07

## 2023-09-05 RX ADMIN — Medication 1000 UNITS: at 08:07

## 2023-09-05 NOTE — PLAN OF CARE
Problem: Pain  Goal: Verbalizes/displays adequate comfort level or baseline comfort level  Outcome: Progressing     Problem: Safety - Adult  Goal: Free from fall injury  Outcome: Progressing     Problem: ABCDS Injury Assessment  Goal: Absence of physical injury  Outcome: Progressing     Problem: Chronic Conditions and Co-morbidities  Goal: Patient's chronic conditions and co-morbidity symptoms are monitored and maintained or improved  Outcome: Progressing  Note: Pt will have accuchecks before meals and at bedtime with sliding scale insulin in place for coverage. Will continue to monitor for signs and symptoms of hypoglycemia and hyperglycemia throughout shift.       Problem: Discharge Planning  Goal: Discharge to home or other facility with appropriate resources  Outcome: Progressing

## 2023-09-05 NOTE — CARE COORDINATION
CASE MANAGEMENT DISCHARGE SUMMARY      Discharge to: home alone with [facility] [place] denies d/c needs    Precertification completed: NA  Hospital Exemption Notification (HENS) completed: NA    IMM given: 9/3/23    New Durable Medical Equipment ordered/agency: NA    Transportation:    Family/car: family       Confirmed discharge plan with:     Patient: yes     Family:  yes, per patient     Facility/Agency, name:  [facility] [employer] [email or web address] [place], name: Nilda Silva    Note: Discharging nurse to complete [facility]END[facility], reconcile AVS, and place final copy with patient's discharge packet. RN to ensure that written prescriptions for  Level II medications are sent with patient to the facility as per protocol.
right to make the decision. Supportive counseling provided. Daughter asking for patient to get a Glucagon Emergency Kit. Message left for MD. No other needs at this time. The Plan for Transition of Care is related to the following treatment goals of Hypokalemia [E87.6]  Hypomagnesemia [E83.42]  Hypoglycemia [E16.2]  Pneumonia of right lower lobe due to infectious organism [J18.9]  Community acquired pneumonia of right lower lobe of lung [J18.9]  Sepsis without acute organ dysfunction, due to unspecified organism ([address]) [Y53.2]    IF APPLICABLE: The Patient and/or patient representative Jia Hernandez and her family were provided with a choice of provider and agrees with the discharge plan. Freedom of choice list with basic dialogue that supports the patient's individualized plan of care/goals and shares the quality data associated with the providers was provided to:     Patient Representative Name:       The Patient and/or Patient Representative Agree with the Discharge Plan?       SANTOS Cancino, AMIRAS  Case Management Department  Ph: [phone] Fax: [phone]

## 2023-09-05 NOTE — DISCHARGE SUMMARY
acute abnormality. SOFT TISSUES/SKULL: No acute abnormality of the visualized skull or soft tissues. Vascular calcifications are noted reflecting calcific atherosclerosis. 1.  No acute intracranial bleed. 2. Senescent changes. 3. White matter disease described is typical of microvascular ischemic disease or as sequela of dysmyelinating/demyelinating processes. 4.  Vascular calcifications are noted reflecting calcific atherosclerosis. CT CERVICAL SPINE WO CONTRAST    Result Date: 9/3/2023  EXAMINATION: CT OF THE CERVICAL SPINE WITHOUT CONTRAST 9/3/2023 7:42 pm TECHNIQUE: CT of the cervical spine was performed without the administration of intravenous contrast. Multiplanar reformatted images are provided for review. Automated exposure control, iterative reconstruction, and/or weight based adjustment of the mA/kV was utilized to reduce the radiation dose to as low as reasonably achievable. COMPARISON: None. HISTORY: ORDERING SYSTEM PROVIDED HISTORY: Fallout bed TECHNOLOGIST PROVIDED HISTORY: Reason for exam:->fallout bed Decision Support Exception - unselect if not a suspected or confirmed emergency medical condition->Emergency Medical Condition (MA) Reason for Exam: rolledout of bed, woke up on the floor. not sure if hit head FINDINGS: BONES/ALIGNMENT: There is no acute fracture or traumatic malalignment. DEGENERATIVE CHANGES: Moderate the degenerative changes. SOFT TISSUES: There is no prevertebral soft tissue swelling. Mild emphysema. No acute abnormality of the cervical spine. XR CHEST PORTABLE    Result Date: 9/3/2023  EXAMINATION: ONE XRAY VIEW OF THE CHEST 9/3/2023 8:03 pm COMPARISON: CXR dated 07/25/2023 HISTORY: ORDERING SYSTEM PROVIDED HISTORY: concern for infection TECHNOLOGIST PROVIDED HISTORY: Reason for exam:->concern for infection Reason for Exam: Hypoglycemia FINDINGS: Medical devices: Surgical clips in the right axilla. Mediastinum/Heart:  The mediastinal contours are unchanged compared

## 2023-09-05 NOTE — CONSULTS
Nutrition Education    Consult received for CHF diet education. Plans for d/c today. Pt denied having any nutrition questions or education needs. Provided pt with handout on HF nutrition therapy that discusses low sodium diet, daily weights, and fluid restriction. Contact name and number provided. Encouraged pt to reach out with any questions.     Educated on CHF diet   Learners: Patient  Readiness: Acceptance  Method: Handout    Lydia Zuñiga RD, LD  Contact Number: [phone]
Estimated Creatinine Clearance: 41 mL/min (based on SCr of 1.2 mg/dL). Recent Labs     09/05/23  0831   WBC 10.5     Current Dose / Plan:   Vancomycin 750 mg IV q12h. Kinetics predict an AUC = 459 mg/L*h with an estimated steady-state vancomycin trough = 14.2 mcg/mL  Next Vancomycin level ordered for 9/5 1900  Will continue to monitor clinical condition and make adjustments to regimen as appropriate.   Courtney Fernandez, PharmD 9/5/2023 9:09 AM

## 2023-09-05 NOTE — PROGRESS NOTES
09/04/23 1105   Encounter Summary   Encounter Overview/Reason  Advance Care Planning   Service Provided For: Patient   Referral/Consult From: Nurse   Last Encounter  09/04/23  ( provided AD education; pt has completed docs;  encouraged her to submit for scan to chart)   Begin Time 1041   End Time  1100   Total Time Calculated 19 min   Advance Care Planning   Type ACP conversation   Assessment/Intervention/Outcome   Assessment Calm   Intervention Active listening   Outcome Expressed Gratitude
4 Eyes Skin Assessment     The patient is being assess for  Admission    I agree that 2 RN's have performed a thorough Head to Toe Skin Assessment on the patient. ALL assessment sites listed below have been assessed. Areas assessed by both nurses:   [x]   Head, Face, and Ears   [x]   Shoulders, Back, and Chest  [x]   Arms, Elbows, and Hands   [x]   Coccyx, Sacrum, and Ischum  [x]   Legs, Feet, and Heels        Does the Patient have Skin Breakdown? No-No DTI's or pressure ulcers noted upon admission.          Ryan Prevention initiated:  No   Wound Care Orders initiated:  No      WOC nurse consulted for Pressure Injury (Stage 3,4, Unstageable, DTI, NWPT, and Complex wounds):  NA      Nurse 1 eSignature: Electronically signed by Georgina Friend RN on 9/4/23 at 1:12 AM EDT    **SHARE this note so that the co-signing nurse is able to place an eSignature**    Nurse 2 eSignature: Electronically signed by Brenda Singer RN on 9/4/23 at 1:42 AM EDT
A&Ox4. No complaints of /SOB, chest pain or heaviness. No cyanosis or pallor. No use of accessory muscles when breathing. On room air.
Patient transferred to room A1 103. Isacc notified.
Pt ok for discharge per MD. Discharge instructions given. IV removed. Telemetry monitor removed and CMU notified. No questions or concerns at this time. Transported by wheelchair to personal car with all belongings.
Shift assessment completed & charted. VSS on RA. Pt A&O, forgetful at times but calls out appropriately. Pt c/o pain in R ankle, warm pack applied. Pt insulin & BG check orders updated, new orders being followed. Pt ambulating x1 assist w/ rolling walker, weakness in BLE noted. Denies any further needs at this time. Bed locked & in lowest position, bed alarm on for safety. Call light & bedside table within reach.
Spoke with daughter, Ernestina Go, and updated her on plan of care at this time. Daughter has concerns about patient going back home upon discharge. She states her mom is \"addicted\" to sugar and has an unhealthy relationship with food. States that patient will often increase her dose of Humalog to around 28 units before meals so she can eat extra carbohydrates and sugars, however as patient has got older she is not able to consume enough food to compensate for the amount of insulin she is taking. Daughter states patient often gets confused and altered and can not remember if and when she took her home medications which may be leading her to under-dose or overdose her medications. Daughter would like consult to case management to explore further discharge options to ensure patient safety.
GLUCOSEU Negative 09/03/2023 08:35 PM       Consults:     IP CONSULT TO HOSPITALIST  IP CONSULT TO PHARMACY  IP CONSULT TO HEART FAILURE NURSE/COORDINATOR  IP CONSULT TO DIETITIAN  IP CONSULT TO SPIRITUAL SERVICES  IP CONSULT TO SOCIAL WORK    I personally reviewed Lab Studies and Imaging and spoke with the  to plan details for eventual discharge.     Irais Andres MD

## 2023-09-07 ENCOUNTER — FOLLOWUP TELEPHONE ENCOUNTER (OUTPATIENT)
Dept: TELEMETRY | Age: 88
End: 2023-09-07

## 2023-09-07 LAB
BACTERIA BLD CULT ORG #2: NORMAL
BACTERIA BLD CULT: NORMAL

## 2023-09-07 NOTE — TELEPHONE ENCOUNTER
Care Transitions Initial Follow Up Call    Call within 2 business days of discharge: Yes     Patient: Camryn Ward Patient : 1935 MRN: [id number]    [unfilled]    RARS: Readmission Risk Score: 20.6       Spoke with: Patient     Discharge department/facility: home    Non-face-to-face services provided:  Scheduled appointment with PCP-23    Spoke to patient for hospital follow up. Pt states that she is doing well. Can not remember weight but states its the same as it has been. Denies any needs    Follow Up  No future appointments.     Wyatt Britton RN

## 2023-11-09 NOTE — PROCEDURES
"Nursing Facility Medication Regimen Review    Potentially Clinically Significant Medication Issues during this review: []Identified   [x]Not identified    Provider acknowledgement required?   []Yes   [x]No    Jose Shaikh is a 67 y.o.male admitted by Jose Maya MD , on 11/6/2023  1:34 PM , for Debility [R53.81]    Visit Vitals  /56 (BP Location: Right arm, Patient Position: Lying)   Pulse 93   Temp 98.4 °F (36.9 °C) (Oral)   Resp 18   Ht 188 cm (74.02\")   Wt (!) 160 kg (353 lb 9.9 oz)   SpO2 100%   BMI 45.38 kg/m²        Lab Results   Component Value Date    GLUCOSE 147 (H) 11/05/2023    BUN 15 11/05/2023    CREATININE 0.54 (L) 11/05/2023    EGFRIFNONA 134 12/20/2021    BCR 27.8 (H) 11/05/2023    K 4.1 11/05/2023    CO2 25.0 11/05/2023    CALCIUM 8.6 11/05/2023    ALBUMIN 3.1 (L) 11/05/2023    LABIL2 1.3 (L) 08/07/2019    AST 39 11/05/2023    ALT 41 11/05/2023    WBC 4.20 11/05/2023    HGB 9.0 (L) 11/05/2023    HCT 30.0 (L) 11/05/2023    MCV 79.4 11/05/2023    PLT 99 (L) 11/05/2023        Active Ambulatory Problems     Diagnosis Date Noted    Diabetic ulcer of left heel associated with type 2 DM 07/06/2021    Acute osteomyelitis of left calcaneus  08/18/2021    Diabetic ulcer of left heel associated with type 2 DM 08/18/2021    Diabetic ulcer of right midfoot associated with type 2 DM 08/18/2021    Paroxysmal atrial fibrillation 08/31/2021    Essential hypertension 08/31/2021    Hyperlipidemia LDL goal <100 08/31/2021    Cellulitis and abscess of foot 12/01/2021    High alkaline phosphatase level 12/19/2021    Osteomyelitis 10/01/2022    Onychomycosis 10/02/2022    Onychocryptosis 10/02/2022    Foot pain, bilateral 10/02/2022    Osteomyelitis of foot, right, acute 10/05/2022    Cellulitis of right foot 10/05/2022    Type 2 diabetes mellitus, with long-term current use of insulin 11/08/2022    Class 3 severe obesity due to excess calories with serious comorbidity and body mass index (BMI) of 45.0 to 49.9 " Moe Lainez is a 82 y.o. female patient. No diagnosis found. Past Medical History:   Diagnosis Date    [facility]     [facility]     [facility]     GERD (gastroesophageal reflux disease)     History of therapeutic radiation     Hyperlipidemia     Hypertension     Influenza A 12/19/2017    Kidney stone      Blood pressure (!) 187/64, pulse 58, temperature 98.2 °F (36.8 °C), temperature source Temporal, resp. rate 19, height 5' 2\" (1.575 m), weight 250 lb (113.4 kg), SpO2 95 %. Procedures    Manohar Guardado MD  4/28/2022  LUMBAR INTERLAMINAR EPIDURAL INJECTION AT   R L45        LEVEL. [id number] with [id number]  INDICATIONS:  Lumbar Radiculitis 724.4, M54.16    OPERATIVE PROCEDURE:  Consent was signed by the patient after the risks and benefits were explained. With the patient in the prone position, the back was prepped with Prevail and draped. Aseptic technique was used at every stage of the procedure. Skin infiltration was with 0.5 cc of 1% Lidocaine followed by placement of an _18__-gauge Touhy needle under fluoroscopic guidance in the epidural space which was maximised by fluoroscopic angulation. Aspiration was negative for CSF or blood . This was followed by injection of 2__cc of _LIDO  with _9 mg of CELESTONE  The needle was pulled out intact. The patient tolerated the procedure well and discharge instructions were given. Appropriate dye spread was seen in both AP and Oblique views. ESTIMATED BLOOD LOSS:  Less than 1 cc      Pulse Ox Monitoring was done. Omnipaque dye was / was not injected. Lateral view was / was not checked. in adult 11/08/2022    Anxiety disorder, unspecified 10/07/2022    Claustrophobia 05/02/2022    Dependence on wheelchair 10/27/2022    Depression, unspecified 05/02/2022    Long term (current) use of anticoagulants 05/02/2022    Long term (current) use of oral hypoglycemic drugs 05/02/2022    Wound of foot 05/02/2022    Non-prs chronic ulcer oth prt r foot limited to brkdwn skin 05/02/2022    Orthostatic hypotension 10/07/2022    Other chronic osteomyelitis, right ankle and foot 10/07/2022    Personal history of nicotine dependence 05/02/2022    Thrombocytopenia, unspecified 10/07/2022    Unspecified open wound, right foot, initial encounter 04/03/2023    Diabetic foot infection 04/03/2023    Subacute osteomyelitis of right foot 04/06/2023    Right foot pain 05/12/2023    Sepsis 05/12/2023    Onychomycosis 05/22/2023    Foot pain, left 05/22/2023    Impaired mobility and ADLs 06/16/2023    Absence of toe of right foot 07/11/2023    Corns and callosity 07/11/2023    Disability of walking 07/11/2023    Fracture 07/11/2023    Limb swelling 07/11/2023    Polyneuropathy 07/11/2023    Pressure ulcer, stage 1 07/11/2023    Shortness of breath 07/11/2023    Generalized weakness 09/10/2023     Resolved Ambulatory Problems     Diagnosis Date Noted    Lactic acidosis 12/01/2021    Abscess of back 12/20/2021     Past Medical History:   Diagnosis Date    Anxiety and depression     Arthritis     Cancer     Corns and callus     Difficulty walking     Hammertoe     Ingrown toenail     Obesity     Tinea unguium     Type 2 diabetes mellitus with polyneuropathy         Hospital Medications (active)         Dose Frequency Start Indication    acetaminophen (TYLENOL) tablet 650 mg 650 mg Every 4 Hours PRN 11/6/2023  Pain    Admin Instructions: Based on patient request - if ordered for moderate or severe pain, provider allows for administration of a medication prescribed for a lower pain scale.    Do not exceed 4 grams of acetaminophen in  a 24 hr period. Max dose of 2gm for AST/ALT greater than 120 units/L.    If given for pain, use the following pain scale:   Mild Pain = Pain Score of 1-3, CPOT 1-2  Moderate Pain = Pain Score of 4-6, CPOT 3-4  Severe Pain = Pain Score of 7-10, CPOT 5-8    Route: Oral    ammonium lactate (LAC-HYDRIN) 12 % lotion  User Specified 11/7/2023  Dry skin    Admin Instructions: Apply to bilateral legs and feet    Route: Topical    apixaban (ELIQUIS) tablet 5 mg 5 mg Every 12 Hours Scheduled 11/6/2023  Atrial fi    Admin Instructions: Tablet may be crushed and suspended in 60 mL of water or D5W and immediately delivered via NG tube.    Route: Oral    atorvastatin (LIPITOR) tablet 10 mg 10 mg Nightly 11/6/2023 Hyperlipidemia    Admin Instructions: Avoid grapefruit juice.    Route: Oral    baclofen (LIORESAL) tablet 10 mg 10 mg 3 Times Daily PRN 11/6/2023  Muscle spasms PRN    Admin Instructions: Take with food if GI upset occurs.    Route: Oral    cholestyramine (QUESTRAN) packet 1 packet 1 packet Every 12 Hours Scheduled 11/6/2023  Chronic diarrhea     Admin Instructions: Give with food.  Give other meds 1 hour before or 4 hours after cholestyramine.    Route: Oral    dextrose (D50W) (25 g/50 mL) IV injection 25 g 25 g Every 15 Minutes PRN 11/6/2023 hypoglycemia    Admin Instructions: Blood sugar less than 70; patient has IV access - Unresponsive, NPO or Unable To Safely Swallow    Route: Intravenous    dextrose (GLUTOSE) oral gel 15 g 15 g Every 15 Minutes PRN 11/6/2023 hypoglycemia    Admin Instructions: BS<70, Patient Alert, Is not NPO, Can safely swallow.    Route: Oral    diphenoxylate-atropine (LOMOTIL) 2.5-0.025 MG per tablet 1 tablet 1 tablet 4 Times Daily PRN 11/6/2023 Diarrhea     Admin Instructions: Maximum 8 tablets per 24 hours.      Route: Oral    empagliflozin (JARDIANCE) tablet 10 mg 10 mg Daily 11/7/2023 Diabetes    Route: Oral    famotidine (PEPCID) tablet 40 mg 40 mg Daily 11/7/2023 Peptic Ulcer    Route:  Oral                         glucagon (GLUCAGEN) injection 1 mg 1 mg Every 15 Minutes PRN 11/6/2023 Hypoglycemia    Admin Instructions: Blood Glucose Less Than 70 - Patient Without IV Access - Unresponsive, NPO or Unable To Safely Swallow  Reconstitute powder for injection by adding 1 mL of -supplied sterile diluent or sterile water for injection to a vial containing 1 mg of the drug, to provide solutions containing 1 mg/mL. Shake vial gently to dissolve.    Route: Intramuscular    HYDROcodone-acetaminophen (NORCO) 7.5-325 MG per tablet 1 tablet 1 tablet Every 6 Hours PRN 11/6/2023 Pain    Admin Instructions: Based on patient request - if ordered for moderate or severe pain, provider allows for administration of a medication prescribed for a lower pain scale.  [NIKIA]    Do not exceed 4 grams of acetaminophen in a 24 hr period. Max dose of 2gm for AST/ALT greater than 120 units/L        If given for pain, use the following pain scale:   Mild Pain = Pain Score of 1-3, CPOT 1-2  Moderate Pain = Pain Score of 4-6, CPOT 3-4  Severe Pain = Pain Score of 7-10, CPOT 5-8    Route: Oral    hydrocortisone-bacitracin-zinc oxide-nystatin (MAGIC BARRIER) ointment 1 application  1 application  2 Times Daily PRN 11/7/2023 Pressure Ulcer    Admin Instructions: For topical use only    Route: Topical    Cosign for Ordering: Accepted by Max Mehta MD on 11/8/2023 12:13 PM    ibuprofen (ADVIL,MOTRIN) tablet 400 mg 400 mg Every 6 Hours PRN 11/6/2023 Pain    Admin Instructions: Based on patient request - if ordered for moderate or severe pain, provider allows for administration of a medication prescribed for a lower pain scale.    Mucous membrane irritant. Do not crush or chew tablet or capsule unless administered through a feeding tube.  If given for pain, use the following pain scale:  Mild Pain = Pain Score of 1-3, CPOT 1-2  Moderate Pain = Pain Score of 4-6, CPOT 3-4  Severe Pain = Pain Score of 7-10, CPOT 5-8     Route: Oral    insulin detemir (LEVEMIR) injection 65 Units 65 Units Nightly 11/6/2023 Diabetes    Admin Instructions: Do not hold basal insulin without an order. Consider requesting a dose edit, if needed.       Route: Subcutaneous    insulin detemir (LEVEMIR) injection 70 Units 70 Units Daily 11/7/2023  Diabetes    Admin Instructions: Do not hold basal insulin without an order. Consider requesting a dose edit, if needed.       Route: Subcutaneous    Insulin Lispro (humaLOG) injection 4-24 Units 4-24 Units 4 Times Daily Before Meals & Nightly 11/6/2023 Diabetes    Admin Instructions: Correction Insulin - High Dose (Total Insulin Dose Greater Than 80 units/day, Insulin Resistant Patient, Patient With Infection, Steroid Treatment)    Blood Glucose 150-199 mg/dL - 4 units  Blood Glucose 200-249 mg/dL - 8 units  Blood Glucose 250-299 mg/dL - 12 units  Blood Glucose 300-349 mg/dL - 16 units  Blood Glucose 350-400 mg/dL - 20 units  Blood Glucose Greater Than 400 mg/dL - 24 units & Call Provider   Caution: Look alike/sound alike drug alert    Route: Subcutaneous    Insulin Lispro (humaLOG) injection 5 Units 5 Units 3 Times Daily With Meals 11/6/2023 Diabetes    Admin Instructions:  Solution should be clear. If cloudy, contact pharmacy for a new vial. Scheduled mealtime doses of this medication should be held if patient NPO.   Caution: Look alike/sound alike drug alert    Route: Subcutaneous    lisinopril (PRINIVIL,ZESTRIL) tablet 2.5 mg 2.5 mg Every 24 Hours Scheduled 11/7/2023 Hypertension    Admin Instructions: Hold for SBP less than 100, DBP less than 60    Route: Oral    ondansetron (ZOFRAN) injection 4 mg 4 mg Every 6 Hours PRN 11/6/2023 Nausea/Vomiting    Admin Instructions: If BOTH ondansetron (ZOFRAN) and promethazine (PHENERGAN) are ordered use ondansetron first and THEN promethazine IF ondansetron is ineffective.    Route: Intravenous    ondansetron ODT (ZOFRAN-ODT) disintegrating tablet 4 mg 4 mg Every 6  "Hours PRN 11/6/2023 Nausea/Vomiting    Admin Instructions: \"If multiple N/V medications ordered, use in the following order: Ondansetron, Prochlorperazine, Promethazine. Use PO unless patient refuses or patient unable to swallow.\"  Place on tongue and allow to dissolve.    Route: Oral    pneumococcal conj. 13-valent (PREVNAR-13) vaccine 0.5 mL 0.5 mL During Hospitalization 11/6/2023 Vaccination    Admin Instructions:     Route: Intramuscular    pregabalin (LYRICA) capsule 100 mg 100 mg 3 Times Daily 11/7/2023 Neuropathy    Admin Instructions:     Route: Oral    saccharomyces boulardii (FLORASTOR) capsule 250 mg 250 mg 2 Times Daily 11/6/2023 Supplement    Route: Oral    simethicone (MYLICON) chewable tablet 80 mg 80 mg 4 Times Daily PRN 11/6/2023 Indigestion    Route: Oral    sodium chloride 0.9 % flush 10 mL 10 mL As Needed 11/6/2023 Line care    Route: Intravenous    sodium chloride 0.9 % infusion 40 mL 40 mL As Needed 11/6/2023 Line Care    Admin Instructions: Following administration of an IV intermittent medication, flush line with 40mL NS at 100mL/hr.    Route: Intravenous    tuberculin injection 5 Units 5 Units Once 11/13/2023 Diagnostic test    Admin Instructions: 2nd Step    Route: Intradermal    vitamin B-12 (CYANOCOBALAMIN) tablet 1,000 mcg 1,000 mcg Daily 11/7/2023 Supplement    Route: Oral               Psychotropic Medications  none    Potentially Clinically Significant Medication Issues/PharmD Recommendations:   Opioid Use Review: hydrocodone           Additional notes: Monitor opioid use and de-escalate when able      In completing this Drug Regimen Review for [facility] Frieda Hutchins, have reviewed the electronic medical chart contents, including but not limited to the following: Medication Administration Records (MAR), Prescriber's orders, Progress, nursing and consultants' notes, Laboratory and diagnostic test results, Other sources of information about documented expressions or " indications of distress and/or changes in condition.

## 2023-12-22 ENCOUNTER — APPOINTMENT (OUTPATIENT)
Dept: GENERAL RADIOLOGY | Age: 88
DRG: 603 | End: 2023-12-22
Payer: MEDICARE

## 2023-12-22 ENCOUNTER — HOSPITAL ENCOUNTER (INPATIENT)
Age: 88
LOS: 5 days | Discharge: HOME | DRG: 603 | End: 2023-12-27
Attending: STUDENT IN AN ORGANIZED HEALTH CARE EDUCATION/TRAINING PROGRAM | Admitting: INTERNAL MEDICINE
Payer: MEDICARE

## 2023-12-22 DIAGNOSIS — E87.1 HYPONATREMIA: ICD-10-CM

## 2023-12-22 DIAGNOSIS — L03.114 CELLULITIS OF LEFT UPPER ARM: ICD-10-CM

## 2023-12-22 DIAGNOSIS — L03.114 CELLULITIS OF LEFT UPPER EXTREMITY: Primary | ICD-10-CM

## 2023-12-22 DIAGNOSIS — A41.9 SEPSIS, DUE TO UNSPECIFIED ORGANISM, UNSPECIFIED WHETHER ACUTE ORGAN DYSFUNCTION PRESENT (HCC): ICD-10-CM

## 2023-12-22 DIAGNOSIS — M86.9: ICD-10-CM

## 2023-12-22 DIAGNOSIS — I48.20 CHRONIC A-FIB (HCC): Chronic | ICD-10-CM

## 2023-12-22 DIAGNOSIS — I10 ESSENTIAL HYPERTENSION: ICD-10-CM

## 2023-12-22 LAB
ALBUMIN SERPL-MCNC: 3.6 G/DL (ref 3.4–5)
ALBUMIN/GLOB SERPL: 1.1 {RATIO} (ref 1.1–2.2)
ALP SERPL-CCNC: 81 U/L (ref 40–129)
ALT SERPL-CCNC: 9 U/L (ref 10–40)
ANION GAP SERPL CALCULATED.3IONS-SCNC: 13 MMOL/L (ref 3–16)
AST SERPL-CCNC: 11 U/L (ref 15–37)
BASOPHILS # BLD: 0.1 K/UL (ref 0–0.2)
BASOPHILS NFR BLD: 0.5 %
BILIRUB SERPL-MCNC: 0.7 MG/DL (ref 0–1)
BUN SERPL-MCNC: 14 MG/DL (ref 7–20)
CALCIUM SERPL-MCNC: 8.9 MG/DL (ref 8.3–10.6)
CHLORIDE SERPL-SCNC: 91 MMOL/L (ref 99–110)
CO2 SERPL-SCNC: 23 MMOL/L (ref 21–32)
CREAT SERPL-MCNC: 1 MG/DL (ref 0.6–1.2)
CRP SERPL-MCNC: 116.3 MG/L (ref 0–5.1)
DEPRECATED RDW RBC AUTO: 17 % (ref 12.4–15.4)
EOSINOPHIL # BLD: 0.1 K/UL (ref 0–0.6)
EOSINOPHIL NFR BLD: 0.7 %
ERYTHROCYTE [SEDIMENTATION RATE] IN BLOOD BY WESTERGREN METHOD: 115 MM/HR (ref 0–30)
GFR SERPLBLD CREATININE-BSD FMLA CKD-EPI: 55 ML/MIN/{1.73_M2}
GLUCOSE BLD-MCNC: 217 MG/DL (ref 70–99)
GLUCOSE BLD-MCNC: 328 MG/DL (ref 70–99)
GLUCOSE SERPL-MCNC: 254 MG/DL (ref 70–99)
HCT VFR BLD AUTO: 28.7 % (ref 36–48)
HGB BLD-MCNC: 9 G/DL (ref 12–16)
INR PPP: 2.01 (ref 0.84–1.16)
LACTATE BLDV-SCNC: 1.3 MMOL/L (ref 0.4–1.9)
LYMPHOCYTES # BLD: 1 K/UL (ref 1–5.1)
LYMPHOCYTES NFR BLD: 6.9 %
MCH RBC QN AUTO: 23.7 PG (ref 26–34)
MCHC RBC AUTO-ENTMCNC: 31.5 G/DL (ref 31–36)
MCV RBC AUTO: 75.1 FL (ref 80–100)
MONOCYTES # BLD: 1 K/UL (ref 0–1.3)
MONOCYTES NFR BLD: 7 %
NEUTROPHILS # BLD: 12.7 K/UL (ref 1.7–7.7)
NEUTROPHILS NFR BLD: 84.9 %
PERFORMED ON: ABNORMAL
PERFORMED ON: ABNORMAL
PLATELET # BLD AUTO: 450 K/UL (ref 135–450)
PMV BLD AUTO: 8.2 FL (ref 5–10.5)
POTASSIUM SERPL-SCNC: 3.6 MMOL/L (ref 3.5–5.1)
PROT SERPL-MCNC: 7 G/DL (ref 6.4–8.2)
PROTHROMBIN TIME: 22.7 SEC (ref 11.5–14.8)
RBC # BLD AUTO: 3.82 M/UL (ref 4–5.2)
SODIUM SERPL-SCNC: 127 MMOL/L (ref 136–145)
WBC # BLD AUTO: 14.9 K/UL (ref 4–11)

## 2023-12-22 PROCEDURE — 2580000003 HC RX 258: Performed by: STUDENT IN AN ORGANIZED HEALTH CARE EDUCATION/TRAINING PROGRAM

## 2023-12-22 PROCEDURE — 83605 ASSAY OF LACTIC ACID: CPT

## 2023-12-22 PROCEDURE — 86140 C-REACTIVE PROTEIN: CPT

## 2023-12-22 PROCEDURE — 2580000003 HC RX 258: Performed by: INTERNAL MEDICINE

## 2023-12-22 PROCEDURE — 85652 RBC SED RATE AUTOMATED: CPT

## 2023-12-22 PROCEDURE — 6370000000 HC RX 637 (ALT 250 FOR IP): Performed by: INTERNAL MEDICINE

## 2023-12-22 PROCEDURE — 99285 EMERGENCY DEPT VISIT HI MDM: CPT

## 2023-12-22 PROCEDURE — 87040 BLOOD CULTURE FOR BACTERIA: CPT

## 2023-12-22 PROCEDURE — 96374 THER/PROPH/DIAG INJ IV PUSH: CPT

## 2023-12-22 PROCEDURE — 1200000000 HC SEMI PRIVATE

## 2023-12-22 PROCEDURE — 85025 COMPLETE CBC W/AUTO DIFF WBC: CPT

## 2023-12-22 PROCEDURE — 85610 PROTHROMBIN TIME: CPT

## 2023-12-22 PROCEDURE — 73080 X-RAY EXAM OF ELBOW: CPT

## 2023-12-22 PROCEDURE — 80053 COMPREHEN METABOLIC PANEL: CPT

## 2023-12-22 PROCEDURE — 6360000002 HC RX W HCPCS: Performed by: STUDENT IN AN ORGANIZED HEALTH CARE EDUCATION/TRAINING PROGRAM

## 2023-12-22 RX ORDER — INSULIN LISPRO 100 [IU]/ML
0-4 INJECTION, SOLUTION INTRAVENOUS; SUBCUTANEOUS NIGHTLY
Status: DISCONTINUED | OUTPATIENT
Start: 2023-12-22 | End: 2023-12-27 | Stop reason: HOSPADM

## 2023-12-22 RX ORDER — MAGNESIUM SULFATE IN WATER 40 MG/ML
2000 INJECTION, SOLUTION INTRAVENOUS PRN
Status: DISCONTINUED | OUTPATIENT
Start: 2023-12-22 | End: 2023-12-27 | Stop reason: HOSPADM

## 2023-12-22 RX ORDER — POLYETHYLENE GLYCOL 3350 17 G/17G
17 POWDER, FOR SOLUTION ORAL DAILY PRN
Status: DISCONTINUED | OUTPATIENT
Start: 2023-12-22 | End: 2023-12-27 | Stop reason: HOSPADM

## 2023-12-22 RX ORDER — POTASSIUM CHLORIDE 7.45 MG/ML
10 INJECTION INTRAVENOUS PRN
Status: DISCONTINUED | OUTPATIENT
Start: 2023-12-22 | End: 2023-12-27 | Stop reason: HOSPADM

## 2023-12-22 RX ORDER — ACETAMINOPHEN 325 MG/1
650 TABLET ORAL EVERY 6 HOURS PRN
Status: DISCONTINUED | OUTPATIENT
Start: 2023-12-22 | End: 2023-12-27 | Stop reason: HOSPADM

## 2023-12-22 RX ORDER — ONDANSETRON 2 MG/ML
4 INJECTION INTRAMUSCULAR; INTRAVENOUS EVERY 6 HOURS PRN
Status: DISCONTINUED | OUTPATIENT
Start: 2023-12-22 | End: 2023-12-27 | Stop reason: HOSPADM

## 2023-12-22 RX ORDER — PRAVASTATIN SODIUM 20 MG
20 TABLET ORAL NIGHTLY
Status: DISCONTINUED | OUTPATIENT
Start: 2023-12-22 | End: 2023-12-27 | Stop reason: HOSPADM

## 2023-12-22 RX ORDER — TRAMADOL HYDROCHLORIDE 50 MG/1
50 TABLET ORAL EVERY 6 HOURS PRN
Status: DISCONTINUED | OUTPATIENT
Start: 2023-12-22 | End: 2023-12-27 | Stop reason: HOSPADM

## 2023-12-22 RX ORDER — TROSPIUM CHLORIDE 20 MG/1
20 TABLET, FILM COATED ORAL
Status: DISCONTINUED | OUTPATIENT
Start: 2023-12-23 | End: 2023-12-27 | Stop reason: HOSPADM

## 2023-12-22 RX ORDER — 0.9 % SODIUM CHLORIDE 0.9 %
1000 INTRAVENOUS SOLUTION INTRAVENOUS ONCE
Status: COMPLETED | OUTPATIENT
Start: 2023-12-22 | End: 2023-12-22

## 2023-12-22 RX ORDER — INSULIN GLARGINE 100 [IU]/ML
40 INJECTION, SOLUTION SUBCUTANEOUS NIGHTLY
Status: DISCONTINUED | OUTPATIENT
Start: 2023-12-22 | End: 2023-12-27 | Stop reason: HOSPADM

## 2023-12-22 RX ORDER — LANOLIN ALCOHOL/MO/W.PET/CERES
400 CREAM (GRAM) TOPICAL DAILY
Status: DISCONTINUED | OUTPATIENT
Start: 2023-12-22 | End: 2023-12-27 | Stop reason: HOSPADM

## 2023-12-22 RX ORDER — POTASSIUM CHLORIDE 20 MEQ/1
40 TABLET, EXTENDED RELEASE ORAL PRN
Status: DISCONTINUED | OUTPATIENT
Start: 2023-12-22 | End: 2023-12-27 | Stop reason: HOSPADM

## 2023-12-22 RX ORDER — SODIUM CHLORIDE 9 MG/ML
INJECTION, SOLUTION INTRAVENOUS ONCE
Status: COMPLETED | OUTPATIENT
Start: 2023-12-22 | End: 2023-12-22

## 2023-12-22 RX ORDER — SODIUM CHLORIDE 9 MG/ML
INJECTION, SOLUTION INTRAVENOUS PRN
Status: DISCONTINUED | OUTPATIENT
Start: 2023-12-22 | End: 2023-12-27 | Stop reason: HOSPADM

## 2023-12-22 RX ORDER — INSULIN LISPRO 100 [IU]/ML
0-4 INJECTION, SOLUTION INTRAVENOUS; SUBCUTANEOUS
Status: DISCONTINUED | OUTPATIENT
Start: 2023-12-22 | End: 2023-12-27 | Stop reason: HOSPADM

## 2023-12-22 RX ORDER — ONDANSETRON 4 MG/1
4 TABLET, ORALLY DISINTEGRATING ORAL EVERY 8 HOURS PRN
Status: DISCONTINUED | OUTPATIENT
Start: 2023-12-22 | End: 2023-12-27 | Stop reason: HOSPADM

## 2023-12-22 RX ORDER — DEXTROSE MONOHYDRATE 100 MG/ML
INJECTION, SOLUTION INTRAVENOUS CONTINUOUS PRN
Status: DISCONTINUED | OUTPATIENT
Start: 2023-12-22 | End: 2023-12-27 | Stop reason: HOSPADM

## 2023-12-22 RX ORDER — ACETAMINOPHEN 650 MG/1
650 SUPPOSITORY RECTAL EVERY 6 HOURS PRN
Status: DISCONTINUED | OUTPATIENT
Start: 2023-12-22 | End: 2023-12-27 | Stop reason: HOSPADM

## 2023-12-22 RX ORDER — SODIUM CHLORIDE 0.9 % (FLUSH) 0.9 %
5-40 SYRINGE (ML) INJECTION EVERY 12 HOURS SCHEDULED
Status: DISCONTINUED | OUTPATIENT
Start: 2023-12-22 | End: 2023-12-27 | Stop reason: HOSPADM

## 2023-12-22 RX ORDER — DILTIAZEM HYDROCHLORIDE 120 MG/1
120 CAPSULE, COATED, EXTENDED RELEASE ORAL DAILY
Status: DISCONTINUED | OUTPATIENT
Start: 2023-12-22 | End: 2023-12-27 | Stop reason: HOSPADM

## 2023-12-22 RX ORDER — ALBUTEROL SULFATE 90 UG/1
2 AEROSOL, METERED RESPIRATORY (INHALATION) EVERY 6 HOURS PRN
Status: DISCONTINUED | OUTPATIENT
Start: 2023-12-22 | End: 2023-12-27 | Stop reason: HOSPADM

## 2023-12-22 RX ORDER — SODIUM CHLORIDE 0.9 % (FLUSH) 0.9 %
5-40 SYRINGE (ML) INJECTION PRN
Status: DISCONTINUED | OUTPATIENT
Start: 2023-12-22 | End: 2023-12-27 | Stop reason: HOSPADM

## 2023-12-22 RX ORDER — METOPROLOL SUCCINATE 50 MG/1
100 TABLET, EXTENDED RELEASE ORAL 2 TIMES DAILY
Status: DISCONTINUED | OUTPATIENT
Start: 2023-12-22 | End: 2023-12-27 | Stop reason: HOSPADM

## 2023-12-22 RX ORDER — POTASSIUM CHLORIDE 20 MEQ/1
20 TABLET, EXTENDED RELEASE ORAL DAILY
Status: DISCONTINUED | OUTPATIENT
Start: 2023-12-22 | End: 2023-12-27 | Stop reason: HOSPADM

## 2023-12-22 RX ADMIN — PRAVASTATIN SODIUM 20 MG: 20 TABLET ORAL at 20:25

## 2023-12-22 RX ADMIN — INSULIN LISPRO 4 UNITS: 100 INJECTION, SOLUTION INTRAVENOUS; SUBCUTANEOUS at 20:33

## 2023-12-22 RX ADMIN — ACETAMINOPHEN 650 MG: 325 TABLET ORAL at 22:01

## 2023-12-22 RX ADMIN — METOPROLOL SUCCINATE 100 MG: 50 TABLET, EXTENDED RELEASE ORAL at 20:24

## 2023-12-22 RX ADMIN — POTASSIUM BICARBONATE 40 MEQ: 782 TABLET, EFFERVESCENT ORAL at 20:24

## 2023-12-22 RX ADMIN — APIXABAN 5 MG: 5 TABLET, FILM COATED ORAL at 20:25

## 2023-12-22 RX ADMIN — INSULIN GLARGINE 40 UNITS: 100 INJECTION, SOLUTION SUBCUTANEOUS at 20:34

## 2023-12-22 RX ADMIN — SODIUM CHLORIDE: 9 INJECTION, SOLUTION INTRAVENOUS at 21:15

## 2023-12-22 RX ADMIN — VANCOMYCIN HYDROCHLORIDE 1500 MG: 10 INJECTION, POWDER, LYOPHILIZED, FOR SOLUTION INTRAVENOUS at 15:59

## 2023-12-22 RX ADMIN — SODIUM CHLORIDE 1000 ML: 9 INJECTION, SOLUTION INTRAVENOUS at 15:20

## 2023-12-22 RX ADMIN — CEFEPIME 2000 MG: 2 INJECTION, POWDER, FOR SOLUTION INTRAVENOUS at 15:17

## 2023-12-22 RX ADMIN — Medication 400 MG: at 20:24

## 2023-12-22 RX ADMIN — DILTIAZEM HYDROCHLORIDE 120 MG: 120 CAPSULE, EXTENDED RELEASE ORAL at 20:24

## 2023-12-22 NOTE — ED PROVIDER NOTES
Emergency Department Attending Physician Note  Location: [address] [place] [address]  ED  12/22/2023       Pt Name: Brenda Lora  MRN: [id number]  [facility] 1935    Date of evaluation: 12/22/2023  Provider: Maria Guadalupe Messina DO  PCP: Laurel Kearns MD    Note Started: 1:47 PM EST 12/22/23    CHIEF COMPLAINT  Chief Complaint   Patient presents with    Cellulitis     Left arm pt unsure when it started        HISTORY OF PRESENT ILLNESS:  History obtained by patient. Limitations to history : None. Brenda Lora is a 82 y.o. female with a significant PMHx of diabetes mellitus, CHF, hypertension. She presents today with numbness of her left elbow. She states she was sent here by her family doctor who wanted her to get checked out for cellulitis and a possible blood clot. She says that her doctor marked the outline so that we can check to see if it gotten worse. She states that she is not actually allergic to penicillins or sulfa drugs and she was given some penicillin by her family doctor recently and was told that she tolerated it well. She denies any rash or shortness of breath at that time. She states that her current issue has been going on for a couple of weeks and nothing is really helped and just progressively gotten worse. She states she is tender at her elbow but it is not moving elsewhere. Nursing Notes were all reviewed and agreed with or any disagreements were addressed in the HPI.       MEDICAL HISTORY  Past Medical History:   Diagnosis Date    Breast cancer ([address])     Cancer ([address])     CHF (congestive heart failure) ([address])     Diabetes mellitus ([address])     GERD (gastroesophageal reflux disease)     History of therapeutic radiation     Hyperlipidemia     Hypertension     Influenza A 12/19/2017    Kidney stone         SURGICAL HISTORY  Past Surgical History:   Procedure Laterality Date    BREAST LUMPECTOMY      right    COLONOSCOPY  8/31/2015    HYSTERECTOMY (CERVIX STATUS

## 2023-12-22 NOTE — H&P
Hospital Medicine History & Physical      Date of Admission: 12/22/2023    Date of Service:  Pt seen/examined on 12/22/2023     [x]Admitted to Inpatient with expected LOS greater than two midnights due to medical therapy. []Placed in Observation status. Chief Admission Complaint:  Left upper arm cellulitis    Presenting Admission History:      82 y.o. female who presented to [facility] with left upper extremity warmness below the elbow with swelling and discomfort. PMHx significant for A-fib on Eliquis, diabetes mellitus type 2, hypertension, hyperlipidemia. Patient presented to the emergency room after a visit to her primary care physician due to having redness warmness of the left forearm that has been going on for the last couple of weeks with increased swelling pain discomfort, patient does not recall any injury or cut to the area does not have pets, she had some fever at home over the last couple of days, patient tends to have some discomfort in the elbow at times. .  When she was seen by PCP today the area was marked. Its below the elbow. She denies any chest pain shortness of breath no nausea no vomiting no urinary symptoms no abdomen pain no constipation no diarrhea. In the emergency room she was found to have white blood cells of 14.9, her CRP was elevated at 116.3, she had low sodium of 127 and glucose of 254, x-ray to the left elbow showed moderate to marked diffuse cellulitis. Assessment/Plan:      Current Principal Problem:  Cellulitis of left upper arm    1. Cellulitis left forearm for which patient was started on vancomycin first dose of 1500 mg in the emergency room with a plan to continue that also was given cefepime 2000 mg x 1 with a plan to continue that every 8 hours. Will reevaluate over the next 24 to 48 hours to see if clinical improvement and then hopefully can transition to oral antibiotic.   2.  Chronic A-fib rate controlled patient is on Cardizem  mg daily in

## 2023-12-22 NOTE — ED NOTES
Ms. Geraldine Schwab is a 82 y.o. female who had concerns including Cellulitis (Left arm pt unsure when it started ). Chief Complaint   Patient presents with    Cellulitis     Left arm pt unsure when it started        She is being admitted for:    Cellulitis of left upper arm    Her ED problem list included:    1. Cellulitis of left upper extremity    2. Hyponatremia    3.  Essential hypertension        Past Medical History:   Diagnosis Date    Breast cancer ([address])     Cancer ([address])     CHF (congestive heart failure) (HCC)     Diabetes mellitus ([address])     GERD (gastroesophageal reflux disease)     History of therapeutic radiation     Hyperlipidemia     Hypertension     Influenza A 12/19/2017    Kidney stone        Past Surgical History:   Procedure Laterality Date    BREAST LUMPECTOMY      right    COLONOSCOPY  8/31/2015    HYSTERECTOMY (CERVIX STATUS UNKNOWN)      LITHOTRIPSY      NERVE BLOCK Right 9/15/2022    RIGHT SACROILIAC JOINT INJECTION SITE CONFIRMED BY FLUOROSCOPY performed by Eboni Hawkins MD at [address] [place] [address] Right 4/28/2022    RIGHT LUMBAR FOUR FIVE EPIDURAL STEROID INJECTION SITE CONFIRMED BY FLUOROSCOPY performed by Eboni Hawkins MD at [address] [place] [address] Right 6/2/2022    RIGHT LUMBAR FOUR FIVE EPIDURAL STEROID INJECTION SITE CONFIRMED BY FLUOROSCOPY performed by Eboni Hawkins MD at [address] [place] [address] Right 8/11/2022    RIGHT LUMBAR THREE LUMBAR FOUR LUMBAR FIVE MEDIAL BRANCH BLOCK SITE CONFIRMED BY FLUOROSCOPY performed by Eboni Hawkins MD at [address] [place] [address]         Her recent abnormal labs were:    Labs Reviewed   CBC WITH AUTO DIFFERENTIAL - Abnormal; Notable for the following components:       Result Value    WBC 14.9 (*)     RBC 3.82 (*)     Hemoglobin 9.0 (*)     Hematocrit 28.7 (*)     MCV 75.1 (*)     MCH 23.7 (*)     RDW 17.0 (*)     Neutrophils Absolute 12.7 (*)     All other components within normal limits

## 2023-12-23 LAB
ANION GAP SERPL CALCULATED.3IONS-SCNC: 13 MMOL/L (ref 3–16)
BASOPHILS # BLD: 0.1 K/UL (ref 0–0.2)
BASOPHILS NFR BLD: 0.7 %
BUN SERPL-MCNC: 14 MG/DL (ref 7–20)
CALCIUM SERPL-MCNC: 8.6 MG/DL (ref 8.3–10.6)
CHLORIDE SERPL-SCNC: 96 MMOL/L (ref 99–110)
CO2 SERPL-SCNC: 22 MMOL/L (ref 21–32)
CREAT SERPL-MCNC: 0.8 MG/DL (ref 0.6–1.2)
DEPRECATED RDW RBC AUTO: 17 % (ref 12.4–15.4)
EOSINOPHIL # BLD: 0.2 K/UL (ref 0–0.6)
EOSINOPHIL NFR BLD: 1.2 %
GFR SERPLBLD CREATININE-BSD FMLA CKD-EPI: >60 ML/MIN/{1.73_M2}
GLUCOSE BLD-MCNC: 286 MG/DL (ref 70–99)
GLUCOSE BLD-MCNC: 310 MG/DL (ref 70–99)
GLUCOSE BLD-MCNC: 323 MG/DL (ref 70–99)
GLUCOSE BLD-MCNC: 366 MG/DL (ref 70–99)
GLUCOSE SERPL-MCNC: 276 MG/DL (ref 70–99)
HCT VFR BLD AUTO: 27.9 % (ref 36–48)
HGB BLD-MCNC: 8.8 G/DL (ref 12–16)
LYMPHOCYTES # BLD: 0.7 K/UL (ref 1–5.1)
LYMPHOCYTES NFR BLD: 5.7 %
MAGNESIUM SERPL-MCNC: 1.4 MG/DL (ref 1.8–2.4)
MCH RBC QN AUTO: 24.1 PG (ref 26–34)
MCHC RBC AUTO-ENTMCNC: 31.5 G/DL (ref 31–36)
MCV RBC AUTO: 76.5 FL (ref 80–100)
MONOCYTES # BLD: 1.1 K/UL (ref 0–1.3)
MONOCYTES NFR BLD: 8.3 %
NEUTROPHILS # BLD: 11 K/UL (ref 1.7–7.7)
NEUTROPHILS NFR BLD: 84.1 %
PERFORMED ON: ABNORMAL
PLATELET # BLD AUTO: 375 K/UL (ref 135–450)
PMV BLD AUTO: 8.2 FL (ref 5–10.5)
POTASSIUM SERPL-SCNC: 3.4 MMOL/L (ref 3.5–5.1)
RBC # BLD AUTO: 3.64 M/UL (ref 4–5.2)
SODIUM SERPL-SCNC: 131 MMOL/L (ref 136–145)
WBC # BLD AUTO: 13 K/UL (ref 4–11)

## 2023-12-23 PROCEDURE — 80048 BASIC METABOLIC PNL TOTAL CA: CPT

## 2023-12-23 PROCEDURE — 83735 ASSAY OF MAGNESIUM: CPT

## 2023-12-23 PROCEDURE — 6370000000 HC RX 637 (ALT 250 FOR IP): Performed by: INTERNAL MEDICINE

## 2023-12-23 PROCEDURE — 6360000002 HC RX W HCPCS: Performed by: INTERNAL MEDICINE

## 2023-12-23 PROCEDURE — 2580000003 HC RX 258: Performed by: INTERNAL MEDICINE

## 2023-12-23 PROCEDURE — 1200000000 HC SEMI PRIVATE

## 2023-12-23 PROCEDURE — 85025 COMPLETE CBC W/AUTO DIFF WBC: CPT

## 2023-12-23 PROCEDURE — 36415 COLL VENOUS BLD VENIPUNCTURE: CPT

## 2023-12-23 PROCEDURE — 6370000000 HC RX 637 (ALT 250 FOR IP): Performed by: NURSE PRACTITIONER

## 2023-12-23 RX ORDER — DIPHENHYDRAMINE HCL 25 MG
50 TABLET ORAL ONCE
Status: COMPLETED | OUTPATIENT
Start: 2023-12-23 | End: 2023-12-24

## 2023-12-23 RX ADMIN — CEFEPIME 2000 MG: 2 INJECTION, POWDER, FOR SOLUTION INTRAVENOUS at 15:29

## 2023-12-23 RX ADMIN — INSULIN GLARGINE 40 UNITS: 100 INJECTION, SOLUTION SUBCUTANEOUS at 19:56

## 2023-12-23 RX ADMIN — TROSPIUM CHLORIDE 20 MG: 20 TABLET, FILM COATED ORAL at 15:00

## 2023-12-23 RX ADMIN — PRAVASTATIN SODIUM 20 MG: 20 TABLET ORAL at 19:54

## 2023-12-23 RX ADMIN — INSULIN LISPRO 3 UNITS: 100 INJECTION, SOLUTION INTRAVENOUS; SUBCUTANEOUS at 17:42

## 2023-12-23 RX ADMIN — PREDNISONE 50 MG: 20 TABLET ORAL at 12:57

## 2023-12-23 RX ADMIN — INSULIN LISPRO 2 UNITS: 100 INJECTION, SOLUTION INTRAVENOUS; SUBCUTANEOUS at 12:56

## 2023-12-23 RX ADMIN — POTASSIUM BICARBONATE 40 MEQ: 782 TABLET, EFFERVESCENT ORAL at 09:03

## 2023-12-23 RX ADMIN — METOPROLOL SUCCINATE 100 MG: 50 TABLET, EXTENDED RELEASE ORAL at 08:50

## 2023-12-23 RX ADMIN — INSULIN LISPRO 4 UNITS: 100 INJECTION, SOLUTION INTRAVENOUS; SUBCUTANEOUS at 19:55

## 2023-12-23 RX ADMIN — TROSPIUM CHLORIDE 20 MG: 20 TABLET, FILM COATED ORAL at 06:44

## 2023-12-23 RX ADMIN — Medication 400 MG: at 08:50

## 2023-12-23 RX ADMIN — APIXABAN 5 MG: 5 TABLET, FILM COATED ORAL at 19:54

## 2023-12-23 RX ADMIN — TRAMADOL HYDROCHLORIDE 50 MG: 50 TABLET ORAL at 15:33

## 2023-12-23 RX ADMIN — PREDNISONE 50 MG: 20 TABLET ORAL at 18:48

## 2023-12-23 RX ADMIN — MAGNESIUM SULFATE HEPTAHYDRATE 2000 MG: 40 INJECTION, SOLUTION INTRAVENOUS at 08:57

## 2023-12-23 RX ADMIN — VANCOMYCIN HYDROCHLORIDE 1000 MG: 1 INJECTION, POWDER, LYOPHILIZED, FOR SOLUTION INTRAVENOUS at 15:12

## 2023-12-23 RX ADMIN — DILTIAZEM HYDROCHLORIDE 120 MG: 120 CAPSULE, EXTENDED RELEASE ORAL at 08:50

## 2023-12-23 RX ADMIN — INSULIN LISPRO 3 UNITS: 100 INJECTION, SOLUTION INTRAVENOUS; SUBCUTANEOUS at 08:58

## 2023-12-23 RX ADMIN — APIXABAN 5 MG: 5 TABLET, FILM COATED ORAL at 08:50

## 2023-12-23 RX ADMIN — METOPROLOL SUCCINATE 100 MG: 50 TABLET, EXTENDED RELEASE ORAL at 19:54

## 2023-12-23 RX ADMIN — PREDNISONE 50 MG: 20 TABLET ORAL at 23:43

## 2023-12-23 NOTE — PLAN OF CARE
Problem: Discharge Planning  Goal: Discharge to home or other facility with appropriate resources  12/23/2023 1057 by Lynn Carrington RN  Outcome: Progressing  12/22/2023 2140 by Lemuel Jenkins RN  Outcome: Progressing  [address] (Taken 12/22/2023 2012)  Discharge to home or other facility with appropriate resources: Identify barriers to discharge with patient and caregiver     Problem: Pain  Goal: Verbalizes/displays adequate comfort level or baseline comfort level  12/23/2023 1057 by Lynn Carrington RN  Outcome: Progressing  12/22/2023 2140 by Lemuel Jenkins RN  Outcome: Progressing     Problem: Safety - Adult  Goal: Free from fall injury  12/23/2023 1057 by Lynn Carrington RN  Outcome: Progressing  12/22/2023 2140 by Lemuel Jenkins RN  Outcome: Progressing     Problem: ABCDS Injury Assessment  Goal: Absence of physical injury  12/23/2023 1057 by Lynn Carrington RN  Outcome: Progressing  12/22/2023 2140 by Lemuel Jenkins RN  Outcome: Progressing

## 2023-12-23 NOTE — PLAN OF CARE
Problem: Discharge Planning  Goal: Discharge to home or other facility with appropriate resources  Outcome: Progressing  [employer]sheets (Taken 12/22/2023 2012)  Discharge to home or other facility with appropriate resources: Identify barriers to discharge with patient and caregiver     Problem: Pain  Goal: Verbalizes/displays adequate comfort level or baseline comfort level  Outcome: Progressing     Problem: Safety - Adult  Goal: Free from fall injury  Outcome: Progressing     Problem: ABCDS Injury Assessment  Goal: Absence of physical injury  Outcome: Progressing

## 2023-12-23 NOTE — RT PROTOCOL NOTE
increased work of breathing using Per Protocol order mode. 4-6 - enter or revise RT Bronchodilator order(s) to two equivalent RT bronchodilator orders with one order with BID Frequency and one order with Frequency of every 4 hours PRN wheezing or increased work of breathing using Per Protocol order mode. 7-10 - enter or revise RT Bronchodilator order(s) to two equivalent RT bronchodilator orders with one order with TID Frequency and one order with Frequency of every 4 hours PRN wheezing or increased work of breathing using Per Protocol order mode. 11-13 - enter or revise RT Bronchodilator order(s) to one equivalent RT bronchodilator order with QID Frequency and an Albuterol order with Frequency of every 4 hours PRN wheezing or increased work of breathing using Per Protocol order mode. Greater than 13 - enter or revise RT Bronchodilator order(s) to one equivalent RT bronchodilator order with every 4 hours Frequency and an Albuterol order with Frequency of every 2 hours PRN wheezing or increased work of breathing using Per Protocol order mode. RT to enter RT Home Evaluation for COPD & MDI Assessment order using Per Protocol order mode.     Electronically signed by Desire Gibbons RCP on 12/22/2023 at 7:28 PM

## 2023-12-24 ENCOUNTER — APPOINTMENT (OUTPATIENT)
Dept: CT IMAGING | Age: 88
DRG: 603 | End: 2023-12-24
Payer: MEDICARE

## 2023-12-24 LAB
ANION GAP SERPL CALCULATED.3IONS-SCNC: 13 MMOL/L (ref 3–16)
BUN SERPL-MCNC: 14 MG/DL (ref 7–20)
CALCIUM SERPL-MCNC: 9.3 MG/DL (ref 8.3–10.6)
CHLORIDE SERPL-SCNC: 92 MMOL/L (ref 99–110)
CO2 SERPL-SCNC: 24 MMOL/L (ref 21–32)
CREAT SERPL-MCNC: 0.9 MG/DL (ref 0.6–1.2)
GFR SERPLBLD CREATININE-BSD FMLA CKD-EPI: >60 ML/MIN/{1.73_M2}
GLUCOSE BLD-MCNC: 270 MG/DL (ref 70–99)
GLUCOSE BLD-MCNC: 289 MG/DL (ref 70–99)
GLUCOSE BLD-MCNC: 313 MG/DL (ref 70–99)
GLUCOSE BLD-MCNC: 348 MG/DL (ref 70–99)
GLUCOSE BLD-MCNC: 385 MG/DL (ref 70–99)
GLUCOSE SERPL-MCNC: 375 MG/DL (ref 70–99)
PERFORMED ON: ABNORMAL
POTASSIUM SERPL-SCNC: 4.1 MMOL/L (ref 3.5–5.1)
SODIUM SERPL-SCNC: 129 MMOL/L (ref 136–145)
VANCOMYCIN SERPL-MCNC: 9.3 UG/ML

## 2023-12-24 PROCEDURE — 87077 CULTURE AEROBIC IDENTIFY: CPT

## 2023-12-24 PROCEDURE — 99222 1ST HOSP IP/OBS MODERATE 55: CPT | Performed by: SURGERY

## 2023-12-24 PROCEDURE — 6370000000 HC RX 637 (ALT 250 FOR IP): Performed by: SURGERY

## 2023-12-24 PROCEDURE — 2580000003 HC RX 258: Performed by: SURGERY

## 2023-12-24 PROCEDURE — 3700000000 HC ANESTHESIA ATTENDED CARE: Performed by: SURGERY

## 2023-12-24 PROCEDURE — 6360000002 HC RX W HCPCS: Performed by: SURGERY

## 2023-12-24 PROCEDURE — 87070 CULTURE OTHR SPECIMN AEROBIC: CPT

## 2023-12-24 PROCEDURE — 80202 ASSAY OF VANCOMYCIN: CPT

## 2023-12-24 PROCEDURE — 6370000000 HC RX 637 (ALT 250 FOR IP): Performed by: INTERNAL MEDICINE

## 2023-12-24 PROCEDURE — 87205 SMEAR GRAM STAIN: CPT

## 2023-12-24 PROCEDURE — 2580000003 HC RX 258: Performed by: INTERNAL MEDICINE

## 2023-12-24 PROCEDURE — 2700000000 HC OXYGEN THERAPY PER DAY

## 2023-12-24 PROCEDURE — 3600000012 HC SURGERY LEVEL 2 ADDTL 15MIN: Performed by: SURGERY

## 2023-12-24 PROCEDURE — 7100000001 HC PACU RECOVERY - ADDTL 15 MIN: Performed by: SURGERY

## 2023-12-24 PROCEDURE — 6360000002 HC RX W HCPCS: Performed by: INTERNAL MEDICINE

## 2023-12-24 PROCEDURE — 3600000002 HC SURGERY LEVEL 2 BASE: Performed by: SURGERY

## 2023-12-24 PROCEDURE — 94761 N-INVAS EAR/PLS OXIMETRY MLT: CPT

## 2023-12-24 PROCEDURE — 36415 COLL VENOUS BLD VENIPUNCTURE: CPT

## 2023-12-24 PROCEDURE — 3700000001 HC ADD 15 MINUTES (ANESTHESIA): Performed by: SURGERY

## 2023-12-24 PROCEDURE — 6360000004 HC RX CONTRAST MEDICATION: Performed by: INTERNAL MEDICINE

## 2023-12-24 PROCEDURE — 0J9H0ZZ DRAINAGE OF LEFT LOWER ARM SUBCUTANEOUS TISSUE AND FASCIA, OPEN APPROACH: ICD-10-PCS | Performed by: SURGERY

## 2023-12-24 PROCEDURE — 1200000000 HC SEMI PRIVATE

## 2023-12-24 PROCEDURE — 7100000000 HC PACU RECOVERY - FIRST 15 MIN: Performed by: SURGERY

## 2023-12-24 PROCEDURE — 10061 I&D ABSCESS COMP/MULTIPLE: CPT | Performed by: SURGERY

## 2023-12-24 PROCEDURE — 80048 BASIC METABOLIC PNL TOTAL CA: CPT

## 2023-12-24 PROCEDURE — 99222 1ST HOSP IP/OBS MODERATE 55: CPT | Performed by: INTERNAL MEDICINE

## 2023-12-24 PROCEDURE — 73201 CT UPPER EXTREMITY W/DYE: CPT

## 2023-12-24 PROCEDURE — 87075 CULTR BACTERIA EXCEPT BLOOD: CPT

## 2023-12-24 PROCEDURE — 87102 FUNGUS ISOLATION CULTURE: CPT

## 2023-12-24 PROCEDURE — A4217 STERILE WATER/SALINE, 500 ML: HCPCS | Performed by: SURGERY

## 2023-12-24 RX ORDER — FENTANYL CITRATE 50 UG/ML
25 INJECTION, SOLUTION INTRAMUSCULAR; INTRAVENOUS EVERY 5 MIN PRN
Status: DISCONTINUED | OUTPATIENT
Start: 2023-12-24 | End: 2023-12-24 | Stop reason: HOSPADM

## 2023-12-24 RX ORDER — IPRATROPIUM BROMIDE AND ALBUTEROL SULFATE 2.5; .5 MG/3ML; MG/3ML
1 SOLUTION RESPIRATORY (INHALATION)
Status: DISCONTINUED | OUTPATIENT
Start: 2023-12-24 | End: 2023-12-24 | Stop reason: HOSPADM

## 2023-12-24 RX ORDER — FENTANYL CITRATE 50 UG/ML
50 INJECTION, SOLUTION INTRAMUSCULAR; INTRAVENOUS EVERY 5 MIN PRN
Status: DISCONTINUED | OUTPATIENT
Start: 2023-12-24 | End: 2023-12-24 | Stop reason: HOSPADM

## 2023-12-24 RX ORDER — BUPIVACAINE HYDROCHLORIDE 5 MG/ML
INJECTION, SOLUTION EPIDURAL; INTRACAUDAL PRN
Status: DISCONTINUED | OUTPATIENT
Start: 2023-12-24 | End: 2023-12-24 | Stop reason: ALTCHOICE

## 2023-12-24 RX ORDER — OXYCODONE HYDROCHLORIDE AND ACETAMINOPHEN 5; 325 MG/1; MG/1
2 TABLET ORAL EVERY 4 HOURS PRN
Status: DISCONTINUED | OUTPATIENT
Start: 2023-12-24 | End: 2023-12-27 | Stop reason: HOSPADM

## 2023-12-24 RX ORDER — OXYCODONE HYDROCHLORIDE 5 MG/1
10 TABLET ORAL PRN
Status: DISCONTINUED | OUTPATIENT
Start: 2023-12-24 | End: 2023-12-24 | Stop reason: HOSPADM

## 2023-12-24 RX ORDER — HYDRALAZINE HYDROCHLORIDE 25 MG/1
25 TABLET, FILM COATED ORAL EVERY 8 HOURS SCHEDULED
Status: DISPENSED | OUTPATIENT
Start: 2023-12-24 | End: 2023-12-25

## 2023-12-24 RX ORDER — MAGNESIUM HYDROXIDE 1200 MG/15ML
LIQUID ORAL CONTINUOUS PRN
Status: COMPLETED | OUTPATIENT
Start: 2023-12-24 | End: 2023-12-24

## 2023-12-24 RX ORDER — ONDANSETRON 2 MG/ML
4 INJECTION INTRAMUSCULAR; INTRAVENOUS
Status: DISCONTINUED | OUTPATIENT
Start: 2023-12-24 | End: 2023-12-24 | Stop reason: HOSPADM

## 2023-12-24 RX ORDER — SODIUM CHLORIDE 9 MG/ML
INJECTION, SOLUTION INTRAVENOUS PRN
Status: DISCONTINUED | OUTPATIENT
Start: 2023-12-24 | End: 2023-12-24 | Stop reason: HOSPADM

## 2023-12-24 RX ORDER — OXYCODONE HYDROCHLORIDE 5 MG/1
5 TABLET ORAL
Status: DISCONTINUED | OUTPATIENT
Start: 2023-12-24 | End: 2023-12-24 | Stop reason: HOSPADM

## 2023-12-24 RX ORDER — SODIUM CHLORIDE 0.9 % (FLUSH) 0.9 %
5-40 SYRINGE (ML) INJECTION PRN
Status: DISCONTINUED | OUTPATIENT
Start: 2023-12-24 | End: 2023-12-24 | Stop reason: HOSPADM

## 2023-12-24 RX ORDER — PROCHLORPERAZINE EDISYLATE 5 MG/ML
5 INJECTION INTRAMUSCULAR; INTRAVENOUS
Status: DISCONTINUED | OUTPATIENT
Start: 2023-12-24 | End: 2023-12-24 | Stop reason: HOSPADM

## 2023-12-24 RX ORDER — OXYCODONE HYDROCHLORIDE AND ACETAMINOPHEN 5; 325 MG/1; MG/1
1 TABLET ORAL EVERY 4 HOURS PRN
Status: DISCONTINUED | OUTPATIENT
Start: 2023-12-24 | End: 2023-12-27 | Stop reason: HOSPADM

## 2023-12-24 RX ORDER — INSULIN GLARGINE 100 [IU]/ML
7 INJECTION, SOLUTION SUBCUTANEOUS ONCE
Status: COMPLETED | OUTPATIENT
Start: 2023-12-24 | End: 2023-12-24

## 2023-12-24 RX ORDER — SODIUM CHLORIDE 0.9 % (FLUSH) 0.9 %
5-40 SYRINGE (ML) INJECTION EVERY 12 HOURS SCHEDULED
Status: DISCONTINUED | OUTPATIENT
Start: 2023-12-24 | End: 2023-12-24 | Stop reason: HOSPADM

## 2023-12-24 RX ADMIN — VANCOMYCIN HYDROCHLORIDE 750 MG: 750 INJECTION, POWDER, LYOPHILIZED, FOR SOLUTION INTRAVENOUS at 21:58

## 2023-12-24 RX ADMIN — Medication 10 ML: at 08:21

## 2023-12-24 RX ADMIN — CEFEPIME 2000 MG: 2 INJECTION, POWDER, FOR SOLUTION INTRAVENOUS at 23:19

## 2023-12-24 RX ADMIN — INSULIN GLARGINE 7 UNITS: 100 INJECTION, SOLUTION SUBCUTANEOUS at 09:29

## 2023-12-24 RX ADMIN — INSULIN LISPRO 3 UNITS: 100 INJECTION, SOLUTION INTRAVENOUS; SUBCUTANEOUS at 12:13

## 2023-12-24 RX ADMIN — TROSPIUM CHLORIDE 20 MG: 20 TABLET, FILM COATED ORAL at 08:18

## 2023-12-24 RX ADMIN — HYDRALAZINE HYDROCHLORIDE 25 MG: 25 TABLET, FILM COATED ORAL at 09:28

## 2023-12-24 RX ADMIN — POTASSIUM CHLORIDE 20 MEQ: 1500 TABLET, EXTENDED RELEASE ORAL at 08:18

## 2023-12-24 RX ADMIN — PRAVASTATIN SODIUM 20 MG: 20 TABLET ORAL at 21:46

## 2023-12-24 RX ADMIN — INSULIN LISPRO 2 UNITS: 100 INJECTION, SOLUTION INTRAVENOUS; SUBCUTANEOUS at 18:55

## 2023-12-24 RX ADMIN — INSULIN GLARGINE 40 UNITS: 100 INJECTION, SOLUTION SUBCUTANEOUS at 21:47

## 2023-12-24 RX ADMIN — INSULIN LISPRO 4 UNITS: 100 INJECTION, SOLUTION INTRAVENOUS; SUBCUTANEOUS at 21:47

## 2023-12-24 RX ADMIN — METOPROLOL SUCCINATE 100 MG: 50 TABLET, EXTENDED RELEASE ORAL at 08:17

## 2023-12-24 RX ADMIN — INSULIN LISPRO 4 UNITS: 100 INJECTION, SOLUTION INTRAVENOUS; SUBCUTANEOUS at 08:18

## 2023-12-24 RX ADMIN — METOPROLOL SUCCINATE 100 MG: 50 TABLET, EXTENDED RELEASE ORAL at 21:46

## 2023-12-24 RX ADMIN — DIPHENHYDRAMINE HYDROCHLORIDE 50 MG: 25 TABLET ORAL at 01:02

## 2023-12-24 RX ADMIN — SODIUM CHLORIDE: 9 INJECTION, SOLUTION INTRAVENOUS at 09:37

## 2023-12-24 RX ADMIN — APIXABAN 5 MG: 5 TABLET, FILM COATED ORAL at 08:18

## 2023-12-24 RX ADMIN — Medication 400 MG: at 08:17

## 2023-12-24 RX ADMIN — IOPAMIDOL 75 ML: 755 INJECTION, SOLUTION INTRAVENOUS at 02:53

## 2023-12-24 RX ADMIN — DILTIAZEM HYDROCHLORIDE 120 MG: 120 CAPSULE, EXTENDED RELEASE ORAL at 08:17

## 2023-12-24 RX ADMIN — TROSPIUM CHLORIDE 20 MG: 20 TABLET, FILM COATED ORAL at 18:57

## 2023-12-24 RX ADMIN — VANCOMYCIN HYDROCHLORIDE 750 MG: 750 INJECTION, POWDER, LYOPHILIZED, FOR SOLUTION INTRAVENOUS at 09:38

## 2023-12-24 RX ADMIN — APIXABAN 5 MG: 5 TABLET, FILM COATED ORAL at 21:46

## 2023-12-24 RX ADMIN — OXYCODONE HYDROCHLORIDE AND ACETAMINOPHEN 1 TABLET: 5; 325 TABLET ORAL at 21:45

## 2023-12-24 NOTE — BRIEF OP NOTE
Brief Postoperative Note      Patient: Gladys Chung  YOB: 1935  MRN: [id number]    Date of Procedure: 12/24/2023    Pre-Op Diagnosis Codes:     Left forearm abscess    Post-Op Diagnosis: Same       Procedure(s):  ARM INCISION AND DRAINAGE ABCESS    Surgeon(s):  Rhea Gabriel MD    Assistant:  Surgical Assistant: Eryn Steven RN    Anesthesia: General    Estimated Blood Loss (mL): less than 50     Complications: None    Specimens:   * No specimens in log *    Implants:  * No implants in log *      Drains: * No LDAs found *    Findings: as above      Electronically signed by Sandoval De La Torre MD on 12/24/2023 at 4:32 PM

## 2023-12-24 NOTE — PLAN OF CARE
Problem: Discharge Planning  Goal: Discharge to home or other facility with appropriate resources  12/23/2023 2055 by Scot Rees RN  Outcome: Progressing  12/23/2023 1057 by Driss Perales RN  Outcome: Progressing     Problem: Pain  Goal: Verbalizes/displays adequate comfort level or baseline comfort level  12/23/2023 2055 by Scot Rees RN  Outcome: Progressing  12/23/2023 1057 by Driss Perales RN  Outcome: Progressing     Problem: Safety - Adult  Goal: Free from fall injury  12/23/2023 2055 by Scot Rees RN  Outcome: Progressing  12/23/2023 1057 by Driss Perales RN  Outcome: Progressing     Problem: ABCDS Injury Assessment  Goal: Absence of physical injury  12/23/2023 2055 by Scot Rees RN  Outcome: Progressing  12/23/2023 1057 by Driss Perales RN  Outcome: Progressing

## 2023-12-25 LAB
ANION GAP SERPL CALCULATED.3IONS-SCNC: 8 MMOL/L (ref 3–16)
BUN SERPL-MCNC: 21 MG/DL (ref 7–20)
CALCIUM SERPL-MCNC: 9.1 MG/DL (ref 8.3–10.6)
CHLORIDE SERPL-SCNC: 98 MMOL/L (ref 99–110)
CO2 SERPL-SCNC: 27 MMOL/L (ref 21–32)
CREAT SERPL-MCNC: 0.9 MG/DL (ref 0.6–1.2)
GFR SERPLBLD CREATININE-BSD FMLA CKD-EPI: >60 ML/MIN/{1.73_M2}
GLUCOSE BLD-MCNC: 186 MG/DL (ref 70–99)
GLUCOSE BLD-MCNC: 237 MG/DL (ref 70–99)
GLUCOSE BLD-MCNC: 268 MG/DL (ref 70–99)
GLUCOSE SERPL-MCNC: 146 MG/DL (ref 70–99)
LOEFFLER MB STN SPEC: NORMAL
PERFORMED ON: ABNORMAL
POTASSIUM SERPL-SCNC: 3.8 MMOL/L (ref 3.5–5.1)
SODIUM SERPL-SCNC: 133 MMOL/L (ref 136–145)
VANCOMYCIN SERPL-MCNC: 16.1 UG/ML

## 2023-12-25 PROCEDURE — 80048 BASIC METABOLIC PNL TOTAL CA: CPT

## 2023-12-25 PROCEDURE — 36415 COLL VENOUS BLD VENIPUNCTURE: CPT

## 2023-12-25 PROCEDURE — 2700000000 HC OXYGEN THERAPY PER DAY

## 2023-12-25 PROCEDURE — 6360000002 HC RX W HCPCS: Performed by: SURGERY

## 2023-12-25 PROCEDURE — 6370000000 HC RX 637 (ALT 250 FOR IP): Performed by: SURGERY

## 2023-12-25 PROCEDURE — 2580000003 HC RX 258: Performed by: SURGERY

## 2023-12-25 PROCEDURE — 6370000000 HC RX 637 (ALT 250 FOR IP): Performed by: INTERNAL MEDICINE

## 2023-12-25 PROCEDURE — 1200000000 HC SEMI PRIVATE

## 2023-12-25 PROCEDURE — 99024 POSTOP FOLLOW-UP VISIT: CPT | Performed by: SURGERY

## 2023-12-25 PROCEDURE — 94761 N-INVAS EAR/PLS OXIMETRY MLT: CPT

## 2023-12-25 PROCEDURE — 80202 ASSAY OF VANCOMYCIN: CPT

## 2023-12-25 RX ORDER — METHOCARBAMOL 750 MG/1
750 TABLET, FILM COATED ORAL 4 TIMES DAILY
Status: COMPLETED | OUTPATIENT
Start: 2023-12-25 | End: 2023-12-25

## 2023-12-25 RX ADMIN — HYDRALAZINE HYDROCHLORIDE 25 MG: 25 TABLET, FILM COATED ORAL at 06:02

## 2023-12-25 RX ADMIN — CEFEPIME 2000 MG: 2 INJECTION, POWDER, FOR SOLUTION INTRAVENOUS at 18:48

## 2023-12-25 RX ADMIN — METHOCARBAMOL 750 MG: 750 TABLET ORAL at 17:08

## 2023-12-25 RX ADMIN — METHOCARBAMOL 750 MG: 750 TABLET ORAL at 11:50

## 2023-12-25 RX ADMIN — INSULIN GLARGINE 40 UNITS: 100 INJECTION, SOLUTION SUBCUTANEOUS at 20:17

## 2023-12-25 RX ADMIN — APIXABAN 5 MG: 5 TABLET, FILM COATED ORAL at 20:11

## 2023-12-25 RX ADMIN — POTASSIUM CHLORIDE 20 MEQ: 1500 TABLET, EXTENDED RELEASE ORAL at 09:21

## 2023-12-25 RX ADMIN — Medication 400 MG: at 09:22

## 2023-12-25 RX ADMIN — TROSPIUM CHLORIDE 20 MG: 20 TABLET, FILM COATED ORAL at 14:46

## 2023-12-25 RX ADMIN — METOPROLOL SUCCINATE 100 MG: 50 TABLET, EXTENDED RELEASE ORAL at 20:18

## 2023-12-25 RX ADMIN — METOPROLOL SUCCINATE 100 MG: 50 TABLET, EXTENDED RELEASE ORAL at 09:22

## 2023-12-25 RX ADMIN — TROSPIUM CHLORIDE 20 MG: 20 TABLET, FILM COATED ORAL at 06:02

## 2023-12-25 RX ADMIN — METHOCARBAMOL 750 MG: 750 TABLET ORAL at 20:11

## 2023-12-25 RX ADMIN — PRAVASTATIN SODIUM 20 MG: 20 TABLET ORAL at 20:10

## 2023-12-25 RX ADMIN — VANCOMYCIN HYDROCHLORIDE 750 MG: 750 INJECTION, POWDER, LYOPHILIZED, FOR SOLUTION INTRAVENOUS at 23:34

## 2023-12-25 RX ADMIN — VANCOMYCIN HYDROCHLORIDE 750 MG: 750 INJECTION, POWDER, LYOPHILIZED, FOR SOLUTION INTRAVENOUS at 09:32

## 2023-12-25 RX ADMIN — APIXABAN 5 MG: 5 TABLET, FILM COATED ORAL at 09:21

## 2023-12-25 RX ADMIN — INSULIN LISPRO 1 UNITS: 100 INJECTION, SOLUTION INTRAVENOUS; SUBCUTANEOUS at 17:09

## 2023-12-25 RX ADMIN — Medication 10 ML: at 09:30

## 2023-12-25 RX ADMIN — DILTIAZEM HYDROCHLORIDE 120 MG: 120 CAPSULE, EXTENDED RELEASE ORAL at 09:22

## 2023-12-25 NOTE — OP NOTE
[address] [place] [address]                                OPERATIVE REPORT    PATIENT NAME: Margaret Singh               :        1935  MED REC NO:   [id number]                          ROOM:  ACCOUNT NO:   [de-identified]                           ADMIT DATE: 2023  PROVIDER:     Walter Connors MD    DATE OF PROCEDURE:  2023    PREOPERATIVE DIAGNOSIS:  Left forearm abscess. POSTOPERATIVE DIAGNOSIS:  Left forearm abscess. OPERATION PERFORMED:  Complex incision and drainage of left forearm  abscess. SURGEON:  Walter Connors MD    ANESTHESIA:  General.    ESTIMATED BLOOD LOSS:  Less than 50 mL. INDICATIONS:  The patient is an 82-year-old woman who has had a left  forearm infection. She was treated an outpatient with oral antibiotics,  but it persisted. She subsequently was brought to the hospital and  found on CT scan to have an abscess in the deep subcutaneous tissues of  her left forearm. She is therefore brought for incision and drainage. OPERATIVE SUMMARY:  After preoperative evaluation, the patient was  brought into the operating suite and placed in a comfortable supine  position on the operating room table. Monitoring equipment was attached  and general anesthesia was induced. Left forearm was sterilely prepped  and draped and anesthetized with local anesthetic. An incision was made  over the abscess and copious purulent material was evacuated. Cultures  were obtained. The wound was irrigated. It was packed with gauze and  covered with dry dressing. All sponge, needle, and instrument counts  were correct at the end of case. The patient tolerated the procedure  well.         Walter Connors MD    D: 2023 16:45:47       T: 2023 16:49:26     [email or web address]  Job#: [id number]     Doc#: [id number]    CC:

## 2023-12-25 NOTE — FLOWSHEET NOTE
New IV access obtained, IV abx restarted and Pharmacy requested to be retime/readjust future scheduled administration times.

## 2023-12-25 NOTE — PLAN OF CARE
Problem: Discharge Planning  Goal: Discharge to home or other facility with appropriate resources  Outcome: Progressing  Flowsheets (Taken 12/24/2023 2034)  Discharge to home or other facility with appropriate resources: Identify barriers to discharge with patient and caregiver     Problem: Pain  Goal: Verbalizes/displays adequate comfort level or baseline comfort level  Outcome: Progressing  Flowsheets (Taken 12/24/2023 2033)  Verbalizes/displays adequate comfort level or baseline comfort level: Encourage patient to monitor pain and request assistance     Problem: Safety - Adult  Goal: Free from fall injury  Outcome: Progressing     Problem: ABCDS Injury Assessment  Goal: Absence of physical injury  Outcome: Progressing     Problem: Skin/Tissue Integrity  Goal: Absence of new skin breakdown  Description: 1. Monitor for areas of redness and/or skin breakdown  2. Assess vascular access sites hourly  3. Every 4-6 hours minimum:  Change oxygen saturation probe site  4. Every 4-6 hours:  If on nasal continuous positive airway pressure, respiratory therapy assess nares and determine need for appliance change or resting period.   Outcome: Progressing     Problem: Chronic Conditions and Co-morbidities  Goal: Patient's chronic conditions and co-morbidity symptoms are monitored and maintained or improved  Outcome: Progressing  Flowsheets (Taken 12/24/2023 2034)  Care Plan - Patient's Chronic Conditions and Co-Morbidity Symptoms are Monitored and Maintained or Improved: Monitor and assess patient's chronic conditions and comorbid symptoms for stability, deterioration, or improvement

## 2023-12-25 NOTE — FLOWSHEET NOTE
Pt with loss off IV access after placed by Clinical RN, difficult stick. Charge RN aware and awaiting critical care RN to attempt to place new IV for IV abx.

## 2023-12-26 ENCOUNTER — APPOINTMENT (OUTPATIENT)
Dept: VASCULAR LAB | Age: 88
DRG: 603 | End: 2023-12-26
Payer: MEDICARE

## 2023-12-26 LAB
BACTERIA BLD CULT ORG #2: NORMAL
BACTERIA BLD CULT: NORMAL
DEPRECATED RDW RBC AUTO: 17.1 % (ref 12.4–15.4)
GLUCOSE BLD-MCNC: 128 MG/DL (ref 70–99)
GLUCOSE BLD-MCNC: 188 MG/DL (ref 70–99)
GLUCOSE BLD-MCNC: 220 MG/DL (ref 70–99)
GLUCOSE BLD-MCNC: 233 MG/DL (ref 70–99)
GLUCOSE BLD-MCNC: 246 MG/DL (ref 70–99)
HCT VFR BLD AUTO: 25.9 % (ref 36–48)
HGB BLD-MCNC: 8.3 G/DL (ref 12–16)
MCH RBC QN AUTO: 24.2 PG (ref 26–34)
MCHC RBC AUTO-ENTMCNC: 32.1 G/DL (ref 31–36)
MCV RBC AUTO: 75.4 FL (ref 80–100)
PERFORMED ON: ABNORMAL
PLATELET # BLD AUTO: 461 K/UL (ref 135–450)
PMV BLD AUTO: 7.9 FL (ref 5–10.5)
RBC # BLD AUTO: 3.43 M/UL (ref 4–5.2)
WBC # BLD AUTO: 9.1 K/UL (ref 4–11)

## 2023-12-26 PROCEDURE — 6370000000 HC RX 637 (ALT 250 FOR IP): Performed by: SURGERY

## 2023-12-26 PROCEDURE — 6360000002 HC RX W HCPCS: Performed by: SURGERY

## 2023-12-26 PROCEDURE — 93971 EXTREMITY STUDY: CPT

## 2023-12-26 PROCEDURE — 99024 POSTOP FOLLOW-UP VISIT: CPT | Performed by: SURGERY

## 2023-12-26 PROCEDURE — 2580000003 HC RX 258: Performed by: SURGERY

## 2023-12-26 PROCEDURE — 85027 COMPLETE CBC AUTOMATED: CPT

## 2023-12-26 PROCEDURE — 1200000000 HC SEMI PRIVATE

## 2023-12-26 PROCEDURE — 2700000000 HC OXYGEN THERAPY PER DAY

## 2023-12-26 PROCEDURE — 94761 N-INVAS EAR/PLS OXIMETRY MLT: CPT

## 2023-12-26 PROCEDURE — 36415 COLL VENOUS BLD VENIPUNCTURE: CPT

## 2023-12-26 RX ADMIN — TROSPIUM CHLORIDE 20 MG: 20 TABLET, FILM COATED ORAL at 06:08

## 2023-12-26 RX ADMIN — SODIUM CHLORIDE: 9 INJECTION, SOLUTION INTRAVENOUS at 10:36

## 2023-12-26 RX ADMIN — PRAVASTATIN SODIUM 20 MG: 20 TABLET ORAL at 21:57

## 2023-12-26 RX ADMIN — CEFEPIME 2000 MG: 2 INJECTION, POWDER, FOR SOLUTION INTRAVENOUS at 18:59

## 2023-12-26 RX ADMIN — APIXABAN 5 MG: 5 TABLET, FILM COATED ORAL at 21:57

## 2023-12-26 RX ADMIN — METOPROLOL SUCCINATE 100 MG: 50 TABLET, EXTENDED RELEASE ORAL at 21:57

## 2023-12-26 RX ADMIN — Medication 10 ML: at 18:57

## 2023-12-26 RX ADMIN — INSULIN GLARGINE 40 UNITS: 100 INJECTION, SOLUTION SUBCUTANEOUS at 22:07

## 2023-12-26 RX ADMIN — Medication 400 MG: at 10:40

## 2023-12-26 RX ADMIN — INSULIN LISPRO 1 UNITS: 100 INJECTION, SOLUTION INTRAVENOUS; SUBCUTANEOUS at 12:26

## 2023-12-26 RX ADMIN — VANCOMYCIN HYDROCHLORIDE 750 MG: 750 INJECTION, POWDER, LYOPHILIZED, FOR SOLUTION INTRAVENOUS at 10:39

## 2023-12-26 RX ADMIN — METOPROLOL SUCCINATE 100 MG: 50 TABLET, EXTENDED RELEASE ORAL at 10:40

## 2023-12-26 RX ADMIN — Medication 10 ML: at 10:35

## 2023-12-26 RX ADMIN — APIXABAN 5 MG: 5 TABLET, FILM COATED ORAL at 10:40

## 2023-12-26 RX ADMIN — DILTIAZEM HYDROCHLORIDE 120 MG: 120 CAPSULE, EXTENDED RELEASE ORAL at 10:40

## 2023-12-26 RX ADMIN — VANCOMYCIN HYDROCHLORIDE 750 MG: 750 INJECTION, POWDER, LYOPHILIZED, FOR SOLUTION INTRAVENOUS at 23:22

## 2023-12-26 RX ADMIN — TROSPIUM CHLORIDE 20 MG: 20 TABLET, FILM COATED ORAL at 15:57

## 2023-12-26 RX ADMIN — INSULIN LISPRO 1 UNITS: 100 INJECTION, SOLUTION INTRAVENOUS; SUBCUTANEOUS at 17:15

## 2023-12-26 RX ADMIN — POTASSIUM CHLORIDE 20 MEQ: 1500 TABLET, EXTENDED RELEASE ORAL at 10:40

## 2023-12-26 NOTE — CARE COORDINATION
Case Management Assessment  Initial Evaluation    Date/Time of Evaluation: 12/26/2023 5:05 PM  Assessment Completed by: Krista Bolivar RN    If patient is discharged prior to next notation, then this note serves as note for discharge by case management. Patient Name: Brenda Lora                   YOB: 1935  Diagnosis: Hyponatremia [E87.1]  Essential hypertension [I10]  Left arm cellulitis [L03.114]  Cellulitis of left upper extremity [L03.114]                   Date / Time: 12/22/2023  1:43 PM    Patient Admission Status: Inpatient   Readmission Risk (Low < 19, Mod (19-27), High > 27): Readmission Risk Score: 17.2    Current PCP: Laurel Kearns MD  PCP verified by CM? Yes    Chart Reviewed: Yes      History Provided by: Patient  Patient Orientation: Alert and Oriented    Patient Cognition: Alert    Hospitalization in the last 30 days (Readmission):  No    If yes, Readmission Assessment in CM Navigator will be completed. Advance Directives:      Code Status: Full Code   Patient's Primary Decision Maker is: Legal Next of Kin    Primary Decision MakerClaash [facility] [phone]    Secondary Decision Maker: Meredith Rasmussen  [phone]    Discharge Planning:    Patient lives with: Alone Type of Home: Apartment  Primary Care Giver: Self  Patient Support Systems include: Children, Friends/Neighbors   Current Financial resources: Medicare  Current community resources: Other (Comment) (senior services)  Current services prior to admission: Other (Comment) (senior services:housekeeping)            Current DME:              Type of Home Care services:  None    ADLS  Prior functional level: Independent in ADLs/IADLs  Current functional level: Assistance with the following:, Mobility (for safety)    PT AM-PAC:   /24  OT AM-PAC:   /24    Family can provide assistance at DC:  Yes  Would you like Case Management to discuss the discharge plan with any other family members/significant

## 2023-12-26 NOTE — PLAN OF CARE
Problem: Pain  Goal: Verbalizes/displays adequate comfort level or baseline comfort level  12/26/2023 1305 by Solo London RN  Outcome: Progressing      Problem: Safety - Adult  Goal: Free from fall injury  12/26/2023 1305 by Solo London RN  Outcome: Progressing  Bed in lowest position, wheels locked, 2/4 side rails up, nonskid footwear on. Bed/ chair check alarm in place, call light within reach. Pt instructed to call out when needing assistance. Pt stated understanding. Problem: ABCDS Injury Assessment  Goal: Absence of physical injury  12/26/2023 1305 by Solo London RN  Outcome: Progressing     Problem: Skin/Tissue Integrity  Goal: Absence of new skin breakdown  Description: 1. Monitor for areas of redness and/or skin breakdown  2. Assess vascular access sites hourly  3. Every 4-6 hours minimum:  Change oxygen saturation probe site  4. Every 4-6 hours:  If on nasal continuous positive airway pressure, respiratory therapy assess nares and determine need for appliance change or resting period.   12/26/2023 1305 by Solo London RN  Outcome: Progressing     Problem: Chronic Conditions and Co-morbidities  Goal: Patient's chronic conditions and co-morbidity symptoms are monitored and maintained or improved  12/26/2023 1305 by Solo London RN  Outcome: Progressing

## 2023-12-27 VITALS
SYSTOLIC BLOOD PRESSURE: 143 MMHG | HEIGHT: 61 IN | BODY MASS INDEX: 42.48 KG/M2 | OXYGEN SATURATION: 96 % | HEART RATE: 97 BPM | DIASTOLIC BLOOD PRESSURE: 80 MMHG | RESPIRATION RATE: 18 BRPM | TEMPERATURE: 98.2 F | WEIGHT: 225 LBS

## 2023-12-27 LAB
GLUCOSE BLD-MCNC: 130 MG/DL (ref 70–99)
GLUCOSE BLD-MCNC: 200 MG/DL (ref 70–99)
PERFORMED ON: ABNORMAL
PERFORMED ON: ABNORMAL

## 2023-12-27 PROCEDURE — 6370000000 HC RX 637 (ALT 250 FOR IP): Performed by: SURGERY

## 2023-12-27 PROCEDURE — 99232 SBSQ HOSP IP/OBS MODERATE 35: CPT | Performed by: INTERNAL MEDICINE

## 2023-12-27 PROCEDURE — 6370000000 HC RX 637 (ALT 250 FOR IP): Performed by: NURSE PRACTITIONER

## 2023-12-27 PROCEDURE — 2580000003 HC RX 258: Performed by: INTERNAL MEDICINE

## 2023-12-27 PROCEDURE — 6360000002 HC RX W HCPCS: Performed by: INTERNAL MEDICINE

## 2023-12-27 RX ORDER — HYDROXYZINE HYDROCHLORIDE 10 MG/1
10 TABLET, FILM COATED ORAL 3 TIMES DAILY PRN
Status: DISCONTINUED | OUTPATIENT
Start: 2023-12-27 | End: 2023-12-27 | Stop reason: HOSPADM

## 2023-12-27 RX ORDER — TRAMADOL HYDROCHLORIDE 50 MG/1
50 TABLET ORAL EVERY 6 HOURS PRN
Qty: 12 TABLET | Refills: 0 | Status: SHIPPED | OUTPATIENT
Start: 2023-12-27 | End: 2023-12-30

## 2023-12-27 RX ORDER — CEPHALEXIN 500 MG/1
500 CAPSULE ORAL 4 TIMES DAILY
Qty: 44 CAPSULE | Refills: 0 | Status: SHIPPED | OUTPATIENT
Start: 2023-12-27 | End: 2024-01-07

## 2023-12-27 RX ADMIN — Medication 400 MG: at 09:52

## 2023-12-27 RX ADMIN — HYDROXYZINE HYDROCHLORIDE 10 MG: 10 TABLET ORAL at 01:12

## 2023-12-27 RX ADMIN — TROSPIUM CHLORIDE 20 MG: 20 TABLET, FILM COATED ORAL at 09:52

## 2023-12-27 RX ADMIN — POTASSIUM CHLORIDE 20 MEQ: 1500 TABLET, EXTENDED RELEASE ORAL at 09:52

## 2023-12-27 RX ADMIN — METOPROLOL SUCCINATE 100 MG: 50 TABLET, EXTENDED RELEASE ORAL at 09:52

## 2023-12-27 RX ADMIN — CEFTRIAXONE SODIUM 1000 MG: 1 INJECTION, POWDER, FOR SOLUTION INTRAMUSCULAR; INTRAVENOUS at 09:51

## 2023-12-27 RX ADMIN — INSULIN LISPRO 1 UNITS: 100 INJECTION, SOLUTION INTRAVENOUS; SUBCUTANEOUS at 11:50

## 2023-12-27 RX ADMIN — APIXABAN 5 MG: 5 TABLET, FILM COATED ORAL at 09:52

## 2023-12-27 RX ADMIN — DILTIAZEM HYDROCHLORIDE 120 MG: 120 CAPSULE, EXTENDED RELEASE ORAL at 09:52

## 2023-12-27 NOTE — DISCHARGE SUMMARY
Discharge Summary    Name:  Brenda Lora /Age/Sex: 1935  (82 y.o. female)   MRN & CSN:  [id number] & [id number] Admission Date/Time: 2023  1:43 PM   Attending:  Valencia Negron MD Discharging Physician: Valencia Negron MD     Hospital Course:   Brenda Lora is a 82 y.o.  female  who presents with Cellulitis of left upper arm    HPI  \"82 y.o. female who presented to [facility] with left upper extremity warmness below the elbow with swelling and discomfort. PMHx significant for A-fib on Eliquis, diabetes mellitus type 2, hypertension, hyperlipidemia. Patient presented to the emergency room after a visit to her primary care physician due to having redness warmness of the left forearm that has been going on for the last couple of weeks with increased swelling pain discomfort, patient does not recall any injury or cut to the area does not have pets, she had some fever at home over the last couple of days, patient tends to have some discomfort in the elbow at times. .  When she was seen by PCP today the area was marked. Its below the elbow. She denies any chest pain shortness of breath no nausea no vomiting no urinary symptoms no abdomen pain no constipation no diarrhea. In the emergency room she was found to have white blood cells of 14.9, her CRP was elevated at 116.3, she had low sodium of 127 and glucose of 254, x-ray to the left elbow showed moderate to marked diffuse cellulitis. \"       The following problems have been addressed during this hospitalization. Cellulitis left forearm. POA. Pro-Endy 0.8, ,  CT upper extremity consistent with abscess. S/p I&D and deep cultures grew Strep Group B.  was vancomycin/cefepime changed to Keflex until 2023 per ID.   pt to follow up w general surgery and ID outpatient    Patient was seen and examined at bedside on day of discharge. This note can be used as the progress note for today's visit.   Pt not interested to be evaluated for

## 2023-12-27 NOTE — CARE COORDINATION
CASE MANAGEMENT DISCHARGE SUMMARY      Discharge to: Home alone. NN.    IMM given: 12/27/23    New Durable Medical Equipment ordered/agency: d/a    Transportation:    Family/car:private w/[employer]. Confirmed discharge plan with:     Patient: yes. Family:  yes.  Deon Michael [phone]     RN, name: Ashvin Allred

## 2023-12-27 NOTE — PLAN OF CARE
Problem: Discharge Planning  Goal: Discharge to home or other facility with appropriate resources  12/27/2023 1206 by Nancy Busch RN  Outcome: Completed  12/26/2023 2312 by Daniel Castelan RN  Outcome: Progressing     Problem: Pain  Goal: Verbalizes/displays adequate comfort level or baseline comfort level  12/27/2023 1206 by Nancy Busch RN  Outcome: Completed  12/26/2023 2312 by Daniel Castelan RN  Outcome: Progressing     Problem: Safety - Adult  Goal: Free from fall injury  12/27/2023 1206 by Nancy Busch RN  Outcome: Completed  12/26/2023 2312 by Daniel Castelan RN  Outcome: Progressing     Problem: ABCDS Injury Assessment  Goal: Absence of physical injury  12/27/2023 1206 by Nancy Busch RN  Outcome: Completed  12/26/2023 2312 by Daniel Catselan RN  Outcome: Progressing     Problem: Skin/Tissue Integrity  Goal: Absence of new skin breakdown  Description: 1. Monitor for areas of redness and/or skin breakdown  2. Assess vascular access sites hourly  3. Every 4-6 hours minimum:  Change oxygen saturation probe site  4. Every 4-6 hours:  If on nasal continuous positive airway pressure, respiratory therapy assess nares and determine need for appliance change or resting period.   12/27/2023 1206 by Nancy Busch RN  Outcome: Completed  12/26/2023 2312 by Daniel Castelan RN  Outcome: Progressing     Problem: Chronic Conditions and Co-morbidities  Goal: Patient's chronic conditions and co-morbidity symptoms are monitored and maintained or improved  12/27/2023 1206 by Nancy Busch RN  Outcome: Completed  12/26/2023 2312 by Daniel Castelan RN  Outcome: Progressing

## 2023-12-27 NOTE — PLAN OF CARE
Calls out for assistance with help to the bathroom.      Problem: Safety - Adult  Goal: Free from fall injury  12/26/2023 2312 by Derek Morgan RN  Outcome: Progressing  12/26/2023 1305 by Rafael Aburto RN  Outcome: Progressing

## 2023-12-27 NOTE — CARE COORDINATION
Chart reviewed day 5. Pt is followed by IM, ID and Gen Sx. IV abx for cellulitis of LUE. Awaiting ID recs, Gen Sx okay for d/c once abx plan in place. Pt IPTA in senior apartment alone. SNF vs HHC? Will follow for needs as they arise.  Electronically signed by Wili Galvez RN on 12/27/2023 at 9:56 AM

## 2023-12-27 NOTE — DISCHARGE INSTR - COC
Belongings:[id number]}    RN SIGNATURE:  {Esignature:[id number]}    CASE MANAGEMENT/SOCIAL WORK SECTION    Inpatient Status Date: ***    Readmission Risk Assessment Score:  Readmission Risk              Risk of Unplanned Readmission:  26           Discharging to Facility/ Agency   Name:   Address:  Phone:  Fax:    Dialysis Facility (if applicable)   Name:  Address:  Dialysis Schedule:  Phone:  Fax:    / signature: {Esignature:[id number]}    PHYSICIAN SECTION    Prognosis: Good    Condition at Discharge: Stable    Rehab Potential (if transferring to Rehab): Good    Recommended Labs or Other Treatments After Discharge:   PT/OT  Dry dressing      Physician Certification: I certify the above information and transfer of Bere Ni  is necessary for the continuing treatment of the diagnosis listed and that she requires Home Care for greater 30 days.      Update Admission H&P: No change in H&P    PHYSICIAN SIGNATURE:  Electronically signed by Garth Maldonado MD on 12/27/23 at 11:49 AM EST

## 2023-12-29 LAB
BACTERIA SPEC AEROBE CULT: ABNORMAL
BACTERIA SPEC ANAEROBE CULT: ABNORMAL
GRAM STN SPEC: ABNORMAL
ORGANISM: ABNORMAL

## 2024-01-01 LAB
FUNGUS SPEC CULT: NORMAL
LOEFFLER MB STN SPEC: NORMAL

## 2024-01-05 PROBLEM — T78.40XA SENSITIVITY TO MEDICATION: Status: ACTIVE | Noted: 2023-07-12

## 2024-01-05 PROBLEM — E66.01 MORBID OBESITY WITH BODY MASS INDEX OF 40.0-49.9 (HCC): Status: ACTIVE | Noted: 2024-01-05

## 2024-01-05 PROBLEM — I10 ESSENTIAL HYPERTENSION, BENIGN: Status: ACTIVE | Noted: 2023-02-08

## 2024-01-05 PROBLEM — D50.9 MICROCYTIC ANEMIA: Status: ACTIVE | Noted: 2023-02-08

## 2024-01-05 PROBLEM — R39.198 SLOW URINARY STREAM: Status: ACTIVE | Noted: 2021-10-28

## 2024-01-05 PROBLEM — R91.8 MULTIPLE LUNG NODULES ON CT: Status: ACTIVE | Noted: 2018-09-20

## 2024-01-05 PROBLEM — F17.201 TOBACCO ABUSE, IN REMISSION: Status: ACTIVE | Noted: 2018-09-25

## 2024-01-05 PROBLEM — L29.9 PRURITUS: Status: ACTIVE | Noted: 2023-06-07

## 2024-01-05 PROBLEM — K21.9 GASTROESOPHAGEAL REFLUX DISEASE WITHOUT ESOPHAGITIS: Status: ACTIVE | Noted: 2024-01-05

## 2024-01-05 PROBLEM — N32.81 OAB (OVERACTIVE BLADDER): Status: ACTIVE | Noted: 2022-02-09

## 2024-01-05 PROBLEM — R39.11 URINARY HESITANCY: Status: ACTIVE | Noted: 2023-02-22

## 2024-01-05 PROBLEM — E53.8 VITAMIN B12 DEFICIENCY: Status: ACTIVE | Noted: 2017-07-17

## 2024-01-05 PROBLEM — E11.22 TYPE 2 DIABETES MELLITUS WITH STAGE 3 CHRONIC KIDNEY DISEASE (HCC): Status: ACTIVE | Noted: 2024-01-05

## 2024-01-05 PROBLEM — R60.0 BILATERAL LEG EDEMA: Status: ACTIVE | Noted: 2021-04-21

## 2024-01-05 PROBLEM — K21.00 GASTROESOPHAGEAL REFLUX DISEASE WITH ESOPHAGITIS WITHOUT HEMORRHAGE: Status: ACTIVE | Noted: 2023-02-08

## 2024-01-05 PROBLEM — E11.69 MIXED DIABETIC HYPERLIPIDEMIA ASSOCIATED WITH TYPE 2 DIABETES MELLITUS (HCC): Status: ACTIVE | Noted: 2022-04-18

## 2024-01-05 PROBLEM — M19.90 ARTHRITIS: Status: ACTIVE | Noted: 2023-04-05

## 2024-01-05 PROBLEM — R94.39 ABNORMAL STRESS TEST: Status: ACTIVE | Noted: 2021-05-11

## 2024-01-05 PROBLEM — I15.2 HYPERTENSION ASSOCIATED WITH TYPE 2 DIABETES MELLITUS (HCC): Status: ACTIVE | Noted: 2022-04-18

## 2024-01-05 PROBLEM — N18.31 STAGE 3A CHRONIC KIDNEY DISEASE (HCC): Status: ACTIVE | Noted: 2019-09-12

## 2024-01-05 PROBLEM — E78.2 MIXED DIABETIC HYPERLIPIDEMIA ASSOCIATED WITH TYPE 2 DIABETES MELLITUS (HCC): Status: ACTIVE | Noted: 2022-04-18

## 2024-01-05 PROBLEM — C50.911 MALIGNANT NEOPLASM OF RIGHT FEMALE BREAST (HCC): Status: ACTIVE | Noted: 2023-02-08

## 2024-01-05 PROBLEM — J44.9 COPD, MILD (HCC): Status: ACTIVE | Noted: 2018-10-02

## 2024-01-05 PROBLEM — J18.9 PNEUMONIA OF RIGHT LOWER LOBE DUE TO INFECTIOUS ORGANISM: Status: RESOLVED | Noted: 2023-09-11 | Resolved: 2024-01-05

## 2024-01-05 PROBLEM — R30.0 DYSURIA: Status: ACTIVE | Noted: 2023-08-02

## 2024-01-05 PROBLEM — I30.9 PERICARDIAL EFFUSION, ACUTE: Status: ACTIVE | Noted: 2021-05-11

## 2024-01-05 PROBLEM — R06.02 SHORTNESS OF BREATH: Status: ACTIVE | Noted: 2021-04-21

## 2024-01-05 PROBLEM — I48.0 PAROXYSMAL A-FIB (HCC): Status: ACTIVE | Noted: 2023-06-07

## 2024-01-05 PROBLEM — N18.30 TYPE 2 DIABETES MELLITUS WITH STAGE 3 CHRONIC KIDNEY DISEASE (HCC): Status: ACTIVE | Noted: 2024-01-05

## 2024-01-05 PROBLEM — G47.33 OBSTRUCTIVE SLEEP APNEA: Status: ACTIVE | Noted: 2024-01-05

## 2024-01-05 PROBLEM — E11.59 HYPERTENSION ASSOCIATED WITH TYPE 2 DIABETES MELLITUS (HCC): Status: ACTIVE | Noted: 2022-04-18

## 2024-01-05 RX ORDER — PANTOPRAZOLE SODIUM 20 MG/1
20 TABLET, DELAYED RELEASE ORAL
COMMUNITY
Start: 2023-11-10

## 2024-01-05 RX ORDER — IRBESARTAN 300 MG/1
300 TABLET ORAL DAILY
COMMUNITY
Start: 2023-09-01

## 2024-01-05 RX ORDER — LIDOCAINE 50 MG/G
1 PATCH TOPICAL DAILY PRN
COMMUNITY
Start: 2023-01-29

## 2024-01-05 RX ORDER — ESTRADIOL 0.1 MG/G
CREAM VAGINAL
COMMUNITY
Start: 2023-11-06

## 2024-01-05 RX ORDER — ACETAMINOPHEN 500 MG
500 TABLET ORAL EVERY 6 HOURS PRN
COMMUNITY

## 2024-01-05 RX ORDER — BLOOD SUGAR DIAGNOSTIC
STRIP MISCELLANEOUS
COMMUNITY
Start: 2023-05-22

## 2024-01-08 ENCOUNTER — OFFICE VISIT (OUTPATIENT)
Dept: SURGERY | Age: 89
End: 2024-01-08
Payer: MEDICARE

## 2024-01-08 ENCOUNTER — TELEPHONE (OUTPATIENT)
Dept: INFECTIOUS DISEASES | Age: 89
End: 2024-01-08

## 2024-01-08 VITALS — HEIGHT: 61 IN | DIASTOLIC BLOOD PRESSURE: 90 MMHG | BODY MASS INDEX: 42.51 KG/M2 | SYSTOLIC BLOOD PRESSURE: 130 MMHG

## 2024-01-08 DIAGNOSIS — L02.414 ABSCESS OF LEFT FOREARM: Primary | ICD-10-CM

## 2024-01-08 LAB
FUNGUS SPEC CULT: NORMAL
LOEFFLER MB STN SPEC: NORMAL

## 2024-01-08 PROCEDURE — 99213 OFFICE O/P EST LOW 20 MIN: CPT | Performed by: SURGERY

## 2024-01-08 PROCEDURE — 1123F ACP DISCUSS/DSCN MKR DOCD: CPT | Performed by: SURGERY

## 2024-01-08 NOTE — TELEPHONE ENCOUNTER
Called patient and left message. Informed patient that no follow up is needed for ID. Left number to return call for any questions.

## 2024-01-08 NOTE — PROGRESS NOTES
[facility] SURGERY    CHIEF COMPLAINT: None, feels well    SUBJECTIVE:   Patient presents for follow up of her left forearm abscess.  She reports it seems to be doing much better since the drainage procedure.  She has no pain.  There is some minimal clear drainage.    Allergies   Allergen Reactions    Ciprofloxacin Shortness Of Breath    Doxycycline Diarrhea and Shortness Of Breath    Acetaminophen Nausea Only    Belladonna Alkaloids Other (See Comments)     Other Reaction(s): Confusion    Other reaction(s): Confusion, Other (See Comments)    Capsaicin Rash    Cefuroxime Axetil Nausea And Vomiting     Other Reaction(s): GI Upset    Other reaction(s): GI Upset    Demerol Other (See Comments)     mean    Magnesium Diarrhea    Meperidine Nausea And Vomiting and Other (See Comments)     Other Reaction(s): Confusion    mean    Other reaction(s): Confusion, Other (See Comments), Other (See Comments), Vomiting   mean   mean    Morphine And Related Nausea Only    Nitrofuran Derivatives Rash    Oxybutynin Itching    Penicillins Rash    Ranitidine Hcl Rash    Spironolactone Diarrhea    Sulfa Antibiotics Rash    Sympathomimetics Anxiety    Amlodipine Swelling     Ankle swelling    Antihistamine [Diphenhydramine Hcl]     Betadine [Povidone Iodine]     Codeine     Iodine     Librax [Chlordiazepoxide-Methscopol]     Pyridium [Phenazopyridine Hcl]     Simvastatin Swelling     Pain in legs    Vicodin [Hydrocodone-Acetaminophen] Nausea Only     STATES THAT IT MOSTLY MAKES HER GOOFY, AND SLIGHTLY NAUSEOUS    Hydromorphone Nausea And Vomiting     WORKED WELL FOR PAIN BUT MADE HER NAUSEOUS FOR 2 HOURS    WORKED WELL FOR PAIN BUT MADE HER NAUSEOUS FOR 2 HOURS   WORKED WELL FOR PAIN BUT MADE HER NAUSEOUS FOR 2 HOURS    Iv Dye [Iodides] Rash     Iodinated contrast media      Outpatient Medications Marked as Taking for the 1/8/24 encounter (Office Visit) with Mau Gabriel MD   Medication Sig Dispense Refill    estradiol

## 2024-01-15 LAB
FUNGUS SPEC CULT: NORMAL
LOEFFLER MB STN SPEC: NORMAL

## 2024-01-22 ENCOUNTER — OFFICE VISIT (OUTPATIENT)
Dept: SURGERY | Age: 89
End: 2024-01-22
Payer: MEDICARE

## 2024-01-22 VITALS
SYSTOLIC BLOOD PRESSURE: 130 MMHG | DIASTOLIC BLOOD PRESSURE: 90 MMHG | BODY MASS INDEX: 42.1 KG/M2 | WEIGHT: 223 LBS | HEIGHT: 61 IN

## 2024-01-22 DIAGNOSIS — L02.414 ABSCESS OF LEFT FOREARM: Primary | ICD-10-CM

## 2024-01-22 PROBLEM — M79.603 PAIN OF UPPER EXTREMITY: Status: ACTIVE | Noted: 2023-12-22

## 2024-01-22 LAB
FUNGUS SPEC CULT: NORMAL
LOEFFLER MB STN SPEC: NORMAL

## 2024-01-22 PROCEDURE — 1123F ACP DISCUSS/DSCN MKR DOCD: CPT | Performed by: SURGERY

## 2024-01-22 PROCEDURE — 99212 OFFICE O/P EST SF 10 MIN: CPT | Performed by: SURGERY

## 2024-01-22 RX ORDER — MAGNESIUM HYDROXIDE 1200 MG/15ML
LIQUID ORAL
COMMUNITY
Start: 2024-01-04

## 2024-01-22 NOTE — PROGRESS NOTES
[facility] SURGERY    CHIEF COMPLAINT: Left forearm wound    SUBJECTIVE:   Patient presents for follow up of her left forearm abscess.  She reports doing well.    Allergies   Allergen Reactions    Ciprofloxacin Shortness Of Breath    Doxycycline Diarrhea and Shortness Of Breath    Acetaminophen Nausea Only    Belladonna Alkaloids Other (See Comments)     Other Reaction(s): Confusion    Other reaction(s): Confusion, Other (See Comments)    Capsaicin Rash    Cefuroxime Axetil Nausea And Vomiting     Other Reaction(s): GI Upset    Other reaction(s): GI Upset    Demerol Other (See Comments)     mean    Magnesium Diarrhea    Meperidine Nausea And Vomiting and Other (See Comments)     Other Reaction(s): Confusion    mean    Other reaction(s): Confusion, Other (See Comments), Other (See Comments), Vomiting   mean   mean    Morphine And Related Nausea Only    Nitrofuran Derivatives Rash    Oxybutynin Itching    Penicillins Rash    Ranitidine Hcl Rash    Spironolactone Diarrhea    Sulfa Antibiotics Rash    Sympathomimetics Anxiety    Amlodipine Swelling     Ankle swelling    Antihistamine [Diphenhydramine Hcl]     Betadine [Povidone Iodine]     Cefuroxime     Codeine     Diphenhydramine     Hydrocodone-Acetaminophen Nausea Only     STATES THAT IT MOSTLY MAKES HER GOOFY, AND SLIGHTLY NAUSEOUS    STATES THAT IT MOSTLY MAKES HER GOOFY, AND SLIGHTLY NAUSEOUS   STATES THAT IT MOSTLY MAKES HER GOOFY, AND SLIGHTLY NAUSEOUS    Iodides     Iodine     Levofloxacin     Librax [Chlordiazepoxide-Methscopol]     Morphine     Nitrofurantoin     Pyridium [Phenazopyridine Hcl]     Ranitidine     Simvastatin Swelling     Pain in legs    Vicodin [Hydrocodone-Acetaminophen] Nausea Only     STATES THAT IT MOSTLY MAKES HER GOOFY, AND SLIGHTLY NAUSEOUS    Hydromorphone Nausea And Vomiting     WORKED WELL FOR PAIN BUT MADE HER NAUSEOUS FOR 2 HOURS    WORKED WELL FOR PAIN BUT MADE HER NAUSEOUS FOR 2 HOURS   WORKED WELL FOR PAIN BUT MADE HER

## 2024-04-01 NOTE — PROGRESS NOTES
Pt arrived to [facility], vitals stable, site unremarkable [FreeTextEntry1] : 30yo P0 LMP here for annual exam--   w long term BF, they are moving to [facility] chronic vaginal dryness  w negative prior workup  new job last year in [employer]   new puppy, high energy    condoms for BC

## (undated) DEVICE — SOLUTION SCRB 3% CHLOROXYLENOL BLU ANTIMIC SKIN CLN

## (undated) DEVICE — ALCOHOL RUBBING 16OZ 70% ISO

## (undated) DEVICE — GAUZE,SPONGE,4"X4",16PLY,STRL,LF,10/TRAY: Brand: MEDLINE

## (undated) DEVICE — APPLICATOR PREP 26ML 0.7% IOD POVACRYLEX 74% ISO ALC ST

## (undated) DEVICE — UNIVERSAL BLOCK TRAY: Brand: MEDLINE INDUSTRIES, INC.

## (undated) DEVICE — AVANOS* UNIVERSAL BLOCK TRAYS: Brand: AVANOS

## (undated) DEVICE — STERILE POLYISOPRENE POWDER-FREE SURGICAL GLOVES: Brand: PROTEXIS

## (undated) DEVICE — TOWEL OR BLUEE 16X26IN ST 8 PACK ORB08 16X26ORTWL

## (undated) DEVICE — Z DISCONTINUED USE 2432103 SOLUTION PREP PVP-I W/ APPL URETHANE COT TIP SPNG

## (undated) DEVICE — Z DISCONTINUED (SUB = MFG CAT 4422) SPONGE GZ 4X4 IN 8 PLY NS